# Patient Record
Sex: FEMALE | Race: WHITE | Employment: OTHER | URBAN - METROPOLITAN AREA
[De-identification: names, ages, dates, MRNs, and addresses within clinical notes are randomized per-mention and may not be internally consistent; named-entity substitution may affect disease eponyms.]

---

## 2017-05-25 PROBLEM — M25.561 ACUTE PAIN OF RIGHT KNEE: Status: ACTIVE | Noted: 2017-05-25

## 2017-06-27 PROBLEM — R31.29 MICROSCOPIC HEMATURIA: Status: ACTIVE | Noted: 2017-06-27

## 2017-07-05 ENCOUNTER — HOSPITAL ENCOUNTER (OUTPATIENT)
Dept: MAMMOGRAPHY | Age: 62
Discharge: HOME | End: 2017-07-05
Attending: FAMILY MEDICINE
Payer: COMMERCIAL

## 2017-07-05 DIAGNOSIS — N63.10 BREAST MASS, RIGHT: ICD-10-CM

## 2017-07-05 PROCEDURE — 76642 ULTRASOUND BREAST LIMITED: CPT

## 2017-07-05 PROCEDURE — 77066 DX MAMMO INCL CAD BI: CPT

## 2017-07-12 ENCOUNTER — HOSPITAL ENCOUNTER (OUTPATIENT)
Dept: CT IMAGING | Age: 62
Discharge: HOME | End: 2017-07-12
Attending: FAMILY MEDICINE
Payer: COMMERCIAL

## 2017-07-12 VITALS — HEIGHT: 59 IN

## 2017-07-12 DIAGNOSIS — R31.29 MICROSCOPIC HEMATURIA: ICD-10-CM

## 2017-07-12 PROCEDURE — 74178 CT ABD&PLV WO CNTR FLWD CNTR: CPT

## 2017-07-12 PROCEDURE — 74011636320 HC RX REV CODE- 636/320: Performed by: FAMILY MEDICINE

## 2017-07-12 PROCEDURE — 74011000258 HC RX REV CODE- 258: Performed by: FAMILY MEDICINE

## 2017-07-12 RX ORDER — SODIUM CHLORIDE 0.9 % (FLUSH) 0.9 %
10 SYRINGE (ML) INJECTION
Status: COMPLETED | OUTPATIENT
Start: 2017-07-12 | End: 2017-07-12

## 2017-07-12 RX ADMIN — IOPAMIDOL 100 ML: 755 INJECTION, SOLUTION INTRAVENOUS at 09:36

## 2017-07-12 RX ADMIN — SODIUM CHLORIDE 100 ML: 900 INJECTION, SOLUTION INTRAVENOUS at 09:36

## 2017-07-12 RX ADMIN — Medication 10 ML: at 09:36

## 2017-07-25 PROBLEM — R00.2 PALPITATIONS: Status: ACTIVE | Noted: 2017-07-25

## 2018-05-17 PROBLEM — R07.89 OTHER CHEST PAIN: Status: ACTIVE | Noted: 2018-05-17

## 2018-05-17 PROBLEM — K21.9 GASTROESOPHAGEAL REFLUX DISEASE WITHOUT ESOPHAGITIS: Status: ACTIVE | Noted: 2018-05-17

## 2018-07-10 PROBLEM — F17.200 TOBACCO DEPENDENCE: Status: ACTIVE | Noted: 2018-07-10

## 2018-07-10 PROBLEM — R07.89 OTHER CHEST PAIN: Status: RESOLVED | Noted: 2018-05-17 | Resolved: 2018-07-10

## 2018-07-26 ENCOUNTER — HOSPITAL ENCOUNTER (OUTPATIENT)
Dept: CT IMAGING | Age: 63
Discharge: HOME | End: 2018-07-26
Attending: FAMILY MEDICINE
Payer: COMMERCIAL

## 2018-07-26 VITALS — HEIGHT: 59 IN | WEIGHT: 150 LBS | BODY MASS INDEX: 30.24 KG/M2

## 2018-07-26 DIAGNOSIS — F17.200 TOBACCO DEPENDENCE: ICD-10-CM

## 2018-07-26 PROCEDURE — G0297 LDCT FOR LUNG CA SCREEN: HCPCS

## 2018-08-14 PROBLEM — L82.0 SEBORRHEIC KERATOSES, INFLAMED: Status: ACTIVE | Noted: 2018-08-14

## 2019-02-06 PROBLEM — M54.2 CERVICALGIA: Status: ACTIVE | Noted: 2019-02-06

## 2019-02-06 PROBLEM — R10.13 DYSPEPSIA: Status: ACTIVE | Noted: 2019-02-06

## 2020-01-01 ENCOUNTER — HOSPITAL ENCOUNTER (OUTPATIENT)
Dept: CT IMAGING | Age: 65
Discharge: HOME | End: 2020-10-23
Attending: INTERNAL MEDICINE
Payer: MEDICARE

## 2020-01-01 DIAGNOSIS — R91.1 LUNG NODULE: ICD-10-CM

## 2020-01-01 PROCEDURE — 71250 CT THORAX DX C-: CPT

## 2020-09-23 PROBLEM — Z87.891 HISTORY OF CIGARETTE SMOKING: Status: ACTIVE | Noted: 2018-07-10

## 2020-09-23 PROBLEM — R91.1 LUNG NODULE: Status: ACTIVE | Noted: 2020-01-01

## 2021-01-01 ENCOUNTER — APPOINTMENT (OUTPATIENT)
Dept: MRI IMAGING | Age: 66
DRG: 542 | End: 2021-01-01
Attending: NURSE PRACTITIONER
Payer: MEDICARE

## 2021-01-01 ENCOUNTER — HOSPICE ADMISSION (OUTPATIENT)
Dept: HOSPICE | Facility: HOSPICE | Age: 66
End: 2021-01-01
Payer: MEDICARE

## 2021-01-01 ENCOUNTER — HOME HEALTH ADMISSION (OUTPATIENT)
Dept: HOME HEALTH SERVICES | Facility: HOME HEALTH | Age: 66
End: 2021-01-01

## 2021-01-01 ENCOUNTER — HOSPITAL ENCOUNTER (OUTPATIENT)
Dept: CT IMAGING | Age: 66
Discharge: HOME | DRG: 542 | End: 2021-02-09
Attending: INTERNAL MEDICINE
Payer: MEDICARE

## 2021-01-01 ENCOUNTER — APPOINTMENT (OUTPATIENT)
Dept: CT IMAGING | Age: 66
End: 2021-01-01
Attending: NURSE PRACTITIONER
Payer: MEDICARE

## 2021-01-01 ENCOUNTER — HOME CARE VISIT (OUTPATIENT)
Dept: HOSPICE | Facility: HOSPICE | Age: 66
End: 2021-01-01
Payer: MEDICARE

## 2021-01-01 ENCOUNTER — DOCUMENTATION ONLY (OUTPATIENT)
Dept: NUTRITION | Age: 66
End: 2021-01-01

## 2021-01-01 ENCOUNTER — HOSPITAL ENCOUNTER (INPATIENT)
Age: 66
LOS: 11 days | Discharge: HOME | DRG: 542 | End: 2021-02-23
Attending: INTERNAL MEDICINE | Admitting: INTERNAL MEDICINE
Payer: MEDICARE

## 2021-01-01 ENCOUNTER — APPOINTMENT (OUTPATIENT)
Dept: NUCLEAR MEDICINE | Age: 66
DRG: 542 | End: 2021-01-01
Attending: NURSE PRACTITIONER
Payer: MEDICARE

## 2021-01-01 ENCOUNTER — HOSPITAL ENCOUNTER (OUTPATIENT)
Dept: INTERVENTIONAL RADIOLOGY/VASCULAR | Age: 66
Discharge: HOME | End: 2021-02-18
Attending: NURSE PRACTITIONER
Payer: MEDICARE

## 2021-01-01 ENCOUNTER — APPOINTMENT (OUTPATIENT)
Dept: CT IMAGING | Age: 66
DRG: 542 | End: 2021-01-01
Attending: NURSE PRACTITIONER
Payer: MEDICARE

## 2021-01-01 ENCOUNTER — HOSPITAL ENCOUNTER (OUTPATIENT)
Age: 66
Setting detail: OBSERVATION
Discharge: HOME | End: 2021-02-04
Attending: EMERGENCY MEDICINE | Admitting: HOSPITALIST
Payer: MEDICARE

## 2021-01-01 ENCOUNTER — HOSPITAL ENCOUNTER (OUTPATIENT)
Dept: INFUSION THERAPY | Age: 66
Discharge: HOME | End: 2021-02-12
Payer: MEDICARE

## 2021-01-01 ENCOUNTER — HOME CARE VISIT (OUTPATIENT)
Dept: SCHEDULING | Facility: HOME HEALTH | Age: 66
End: 2021-01-01
Payer: MEDICARE

## 2021-01-01 ENCOUNTER — APPOINTMENT (OUTPATIENT)
Dept: GENERAL RADIOLOGY | Age: 66
End: 2021-01-01
Attending: EMERGENCY MEDICINE
Payer: MEDICARE

## 2021-01-01 ENCOUNTER — APPOINTMENT (OUTPATIENT)
Dept: MRI IMAGING | Age: 66
End: 2021-01-01
Attending: FAMILY MEDICINE
Payer: MEDICARE

## 2021-01-01 ENCOUNTER — PATIENT OUTREACH (OUTPATIENT)
Dept: CASE MANAGEMENT | Age: 66
End: 2021-01-01

## 2021-01-01 ENCOUNTER — HOSPITAL ENCOUNTER (OUTPATIENT)
Dept: ULTRASOUND IMAGING | Age: 66
Discharge: HOME | End: 2021-02-02
Attending: NURSE PRACTITIONER
Payer: MEDICARE

## 2021-01-01 ENCOUNTER — HOSPITAL ENCOUNTER (OUTPATIENT)
Dept: INTERVENTIONAL RADIOLOGY/VASCULAR | Age: 66
Discharge: HOME | End: 2021-02-15
Attending: INTERNAL MEDICINE
Payer: MEDICARE

## 2021-01-01 ENCOUNTER — APPOINTMENT (OUTPATIENT)
Dept: GENERAL RADIOLOGY | Age: 66
DRG: 542 | End: 2021-01-01
Attending: NURSE PRACTITIONER
Payer: MEDICARE

## 2021-01-01 VITALS
HEIGHT: 59 IN | SYSTOLIC BLOOD PRESSURE: 130 MMHG | HEART RATE: 60 BPM | WEIGHT: 120 LBS | DIASTOLIC BLOOD PRESSURE: 90 MMHG | BODY MASS INDEX: 24.19 KG/M2

## 2021-01-01 VITALS
WEIGHT: 125.38 LBS | RESPIRATION RATE: 18 BRPM | SYSTOLIC BLOOD PRESSURE: 119 MMHG | DIASTOLIC BLOOD PRESSURE: 66 MMHG | HEIGHT: 59 IN | HEART RATE: 72 BPM | OXYGEN SATURATION: 97 % | TEMPERATURE: 98 F | BODY MASS INDEX: 25.28 KG/M2

## 2021-01-01 VITALS
DIASTOLIC BLOOD PRESSURE: 50 MMHG | TEMPERATURE: 98 F | SYSTOLIC BLOOD PRESSURE: 90 MMHG | RESPIRATION RATE: 16 BRPM | HEART RATE: 60 BPM

## 2021-01-01 VITALS
OXYGEN SATURATION: 98 % | HEART RATE: 74 BPM | HEIGHT: 59 IN | SYSTOLIC BLOOD PRESSURE: 219 MMHG | BODY MASS INDEX: 24.19 KG/M2 | RESPIRATION RATE: 18 BRPM | WEIGHT: 120 LBS | DIASTOLIC BLOOD PRESSURE: 100 MMHG | TEMPERATURE: 97.8 F

## 2021-01-01 VITALS
TEMPERATURE: 98.7 F | RESPIRATION RATE: 28 BRPM | DIASTOLIC BLOOD PRESSURE: 64 MMHG | SYSTOLIC BLOOD PRESSURE: 148 MMHG | HEART RATE: 144 BPM

## 2021-01-01 VITALS
RESPIRATION RATE: 18 BRPM | WEIGHT: 127.31 LBS | TEMPERATURE: 97.2 F | HEIGHT: 59 IN | SYSTOLIC BLOOD PRESSURE: 160 MMHG | OXYGEN SATURATION: 98 % | DIASTOLIC BLOOD PRESSURE: 86 MMHG | HEART RATE: 78 BPM | BODY MASS INDEX: 25.67 KG/M2

## 2021-01-01 VITALS
SYSTOLIC BLOOD PRESSURE: 130 MMHG | HEART RATE: 78 BPM | RESPIRATION RATE: 16 BRPM | TEMPERATURE: 97.6 F | DIASTOLIC BLOOD PRESSURE: 72 MMHG | HEIGHT: 59 IN | BODY MASS INDEX: 25.2 KG/M2 | OXYGEN SATURATION: 91 % | WEIGHT: 125 LBS

## 2021-01-01 VITALS
BODY MASS INDEX: 24.19 KG/M2 | DIASTOLIC BLOOD PRESSURE: 95 MMHG | SYSTOLIC BLOOD PRESSURE: 184 MMHG | HEIGHT: 59 IN | WEIGHT: 120 LBS | RESPIRATION RATE: 16 BRPM | HEART RATE: 76 BPM | OXYGEN SATURATION: 97 % | TEMPERATURE: 97.2 F

## 2021-01-01 VITALS
DIASTOLIC BLOOD PRESSURE: 90 MMHG | HEART RATE: 108 BPM | OXYGEN SATURATION: 100 % | SYSTOLIC BLOOD PRESSURE: 148 MMHG | RESPIRATION RATE: 16 BRPM

## 2021-01-01 VITALS
SYSTOLIC BLOOD PRESSURE: 142 MMHG | DIASTOLIC BLOOD PRESSURE: 76 MMHG | HEART RATE: 100 BPM | RESPIRATION RATE: 24 BRPM | TEMPERATURE: 97.4 F

## 2021-01-01 DIAGNOSIS — R91.1 PULMONARY NODULE: ICD-10-CM

## 2021-01-01 DIAGNOSIS — R79.89 ELEVATED LFTS: ICD-10-CM

## 2021-01-01 DIAGNOSIS — F41.9 ANXIETY: ICD-10-CM

## 2021-01-01 DIAGNOSIS — G89.3 CANCER ASSOCIATED PAIN: ICD-10-CM

## 2021-01-01 DIAGNOSIS — R10.84 ABDOMINAL PAIN, GENERALIZED: ICD-10-CM

## 2021-01-01 DIAGNOSIS — K86.9 PANCREATIC LESION: ICD-10-CM

## 2021-01-01 DIAGNOSIS — K76.9 HEPATIC LESION: ICD-10-CM

## 2021-01-01 DIAGNOSIS — E87.6 HYPOKALEMIA: ICD-10-CM

## 2021-01-01 DIAGNOSIS — R79.89 ELEVATED LACTIC ACID LEVEL: ICD-10-CM

## 2021-01-01 DIAGNOSIS — R10.84 GENERALIZED ABDOMINAL PAIN: ICD-10-CM

## 2021-01-01 DIAGNOSIS — C50.919 MALIGNANT NEOPLASM OF FEMALE BREAST, UNSPECIFIED ESTROGEN RECEPTOR STATUS, UNSPECIFIED LATERALITY, UNSPECIFIED SITE OF BREAST (HCC): ICD-10-CM

## 2021-01-01 DIAGNOSIS — C50.919 METASTATIC BREAST CANCER (HCC): ICD-10-CM

## 2021-01-01 DIAGNOSIS — R91.1 LUNG NODULE: ICD-10-CM

## 2021-01-01 DIAGNOSIS — R41.3 MEMORY LOSS: ICD-10-CM

## 2021-01-01 DIAGNOSIS — C79.9 METASTATIC ADENOCARCINOMA (HCC): ICD-10-CM

## 2021-01-01 DIAGNOSIS — E83.42 HYPOMAGNESEMIA: ICD-10-CM

## 2021-01-01 DIAGNOSIS — Z51.5 ENCOUNTER FOR PALLIATIVE CARE: ICD-10-CM

## 2021-01-01 DIAGNOSIS — R74.8 ELEVATED LIVER ENZYMES: ICD-10-CM

## 2021-01-01 DIAGNOSIS — R41.82 ALTERED MENTAL STATUS, UNSPECIFIED ALTERED MENTAL STATUS TYPE: ICD-10-CM

## 2021-01-01 DIAGNOSIS — R44.3 HALLUCINATIONS: ICD-10-CM

## 2021-01-01 DIAGNOSIS — R93.7 ABNORMAL MRI SCAN, BONE: ICD-10-CM

## 2021-01-01 DIAGNOSIS — R53.83 FATIGUE, UNSPECIFIED TYPE: ICD-10-CM

## 2021-01-01 DIAGNOSIS — R11.0 NAUSEA: ICD-10-CM

## 2021-01-01 DIAGNOSIS — K59.03 DRUG-INDUCED CONSTIPATION: ICD-10-CM

## 2021-01-01 DIAGNOSIS — N63.0 BREAST MASS IN FEMALE: ICD-10-CM

## 2021-01-01 DIAGNOSIS — N39.0 URINARY TRACT INFECTION WITHOUT HEMATURIA, SITE UNSPECIFIED: Primary | ICD-10-CM

## 2021-01-01 DIAGNOSIS — K76.9 LESION OF LIVER: ICD-10-CM

## 2021-01-01 DIAGNOSIS — E86.0 DEHYDRATION: Primary | ICD-10-CM

## 2021-01-01 DIAGNOSIS — E86.0 DEHYDRATION: ICD-10-CM

## 2021-01-01 DIAGNOSIS — R62.7 FAILURE TO THRIVE IN ADULT: ICD-10-CM

## 2021-01-01 DIAGNOSIS — I10 ESSENTIAL HYPERTENSION: ICD-10-CM

## 2021-01-01 DIAGNOSIS — R41.0 CONFUSION: Primary | ICD-10-CM

## 2021-01-01 DIAGNOSIS — C79.9 METASTATIC MALIGNANT NEOPLASM, UNSPECIFIED SITE (HCC): ICD-10-CM

## 2021-01-01 DIAGNOSIS — K59.00 CONSTIPATION, UNSPECIFIED CONSTIPATION TYPE: ICD-10-CM

## 2021-01-01 LAB
25(OH)D3 SERPL-MCNC: 78 NG/ML (ref 30–100)
AFP-TM SERPL-MCNC: 2.3 NG/ML
ALBUMIN SERPL-MCNC: 2 G/DL (ref 3.2–4.6)
ALBUMIN SERPL-MCNC: 2.2 G/DL (ref 3.2–4.6)
ALBUMIN SERPL-MCNC: 2.2 G/DL (ref 3.2–4.6)
ALBUMIN SERPL-MCNC: 2.3 G/DL (ref 3.2–4.6)
ALBUMIN SERPL-MCNC: 2.4 G/DL (ref 3.2–4.6)
ALBUMIN SERPL-MCNC: 2.5 G/DL (ref 3.2–4.6)
ALBUMIN SERPL-MCNC: 2.6 G/DL (ref 3.2–4.6)
ALBUMIN SERPL-MCNC: 2.7 G/DL (ref 3.2–4.6)
ALBUMIN SERPL-MCNC: 2.7 G/DL (ref 3.2–4.6)
ALBUMIN SERPL-MCNC: 2.8 G/DL (ref 3.2–4.6)
ALBUMIN SERPL-MCNC: 3 G/DL (ref 3.2–4.6)
ALBUMIN/GLOB SERPL: 0.6 {RATIO} (ref 1.2–3.5)
ALBUMIN/GLOB SERPL: 0.7 {RATIO} (ref 1.2–3.5)
ALBUMIN/GLOB SERPL: 0.7 {RATIO} (ref 1.2–3.5)
ALP SERPL-CCNC: 168 U/L (ref 50–136)
ALP SERPL-CCNC: 178 U/L (ref 50–136)
ALP SERPL-CCNC: 189 U/L (ref 50–136)
ALP SERPL-CCNC: 194 U/L (ref 50–130)
ALP SERPL-CCNC: 207 U/L (ref 50–130)
ALP SERPL-CCNC: 243 U/L (ref 50–136)
ALP SERPL-CCNC: 250 U/L (ref 50–136)
ALP SERPL-CCNC: 252 U/L (ref 50–136)
ALP SERPL-CCNC: 266 U/L (ref 50–130)
ALP SERPL-CCNC: 266 U/L (ref 50–136)
ALP SERPL-CCNC: 269 U/L (ref 50–136)
ALP SERPL-CCNC: 276 U/L (ref 50–136)
ALP SERPL-CCNC: 289 U/L (ref 50–136)
ALP SERPL-CCNC: 342 U/L (ref 50–136)
ALP SERPL-CCNC: 354 U/L (ref 50–136)
ALP SERPL-CCNC: 363 U/L (ref 50–136)
ALP SERPL-CCNC: 434 U/L (ref 50–136)
ALP SERPL-CCNC: 455 U/L (ref 50–136)
ALT SERPL-CCNC: 104 U/L (ref 12–65)
ALT SERPL-CCNC: 20 U/L (ref 12–65)
ALT SERPL-CCNC: 23 U/L (ref 12–65)
ALT SERPL-CCNC: 25 U/L (ref 12–65)
ALT SERPL-CCNC: 27 U/L (ref 12–65)
ALT SERPL-CCNC: 27 U/L (ref 12–65)
ALT SERPL-CCNC: 29 U/L (ref 12–65)
ALT SERPL-CCNC: 33 U/L (ref 12–65)
ALT SERPL-CCNC: 37 U/L (ref 12–65)
ALT SERPL-CCNC: 38 U/L (ref 12–65)
ALT SERPL-CCNC: 38 U/L (ref 12–65)
ALT SERPL-CCNC: 40 U/L (ref 12–65)
ALT SERPL-CCNC: 51 U/L (ref 12–65)
ALT SERPL-CCNC: 59 U/L (ref 12–65)
ALT SERPL-CCNC: 89 U/L (ref 12–65)
ALT SERPL-CCNC: 90 U/L (ref 12–65)
AMMONIA PLAS-SCNC: 20 UMOL/L (ref 24.9–68)
AMYLASE SERPL-CCNC: 38 U/L (ref 25–115)
ANION GAP SERPL CALC-SCNC: 10 MMOL/L (ref 7–16)
ANION GAP SERPL CALC-SCNC: 11 MMOL/L (ref 7–16)
ANION GAP SERPL CALC-SCNC: 12 MMOL/L (ref 7–16)
ANION GAP SERPL CALC-SCNC: 13 MMOL/L (ref 7–16)
ANION GAP SERPL CALC-SCNC: 14 MMOL/L (ref 7–16)
ANION GAP SERPL CALC-SCNC: 7 MMOL/L (ref 7–16)
ANION GAP SERPL CALC-SCNC: 8 MMOL/L (ref 7–16)
ANION GAP SERPL CALC-SCNC: 8 MMOL/L (ref 7–16)
ANION GAP SERPL CALC-SCNC: 9 MMOL/L (ref 7–16)
APPEARANCE FLD: CLEAR
APPEARANCE FLD: NORMAL
APPEARANCE UR: ABNORMAL
APPEARANCE UR: CLEAR
AST SERPL-CCNC: 105 U/L (ref 15–37)
AST SERPL-CCNC: 21 U/L (ref 15–37)
AST SERPL-CCNC: 24 U/L (ref 15–37)
AST SERPL-CCNC: 27 U/L (ref 15–37)
AST SERPL-CCNC: 30 U/L (ref 15–37)
AST SERPL-CCNC: 31 U/L (ref 15–37)
AST SERPL-CCNC: 31 U/L (ref 15–37)
AST SERPL-CCNC: 36 U/L (ref 15–37)
AST SERPL-CCNC: 40 U/L (ref 15–37)
AST SERPL-CCNC: 41 U/L (ref 15–37)
AST SERPL-CCNC: 42 U/L (ref 15–37)
AST SERPL-CCNC: 44 U/L (ref 15–37)
AST SERPL-CCNC: 55 U/L (ref 15–37)
AST SERPL-CCNC: 57 U/L (ref 15–37)
AST SERPL-CCNC: 62 U/L (ref 15–37)
AST SERPL-CCNC: 75 U/L (ref 15–37)
AST SERPL-CCNC: 80 U/L (ref 15–37)
AST SERPL-CCNC: 99 U/L (ref 15–37)
ATRIAL RATE: 119 BPM
BACTERIA SPEC CULT: NORMAL
BACTERIA URNS QL MICRO: ABNORMAL /HPF
BASOPHILS # BLD: 0 K/UL (ref 0–0.2)
BASOPHILS NFR BLD: 0 % (ref 0–2)
BILIRUB DIRECT SERPL-MCNC: 0.1 MG/DL
BILIRUB SERPL-MCNC: 0.3 MG/DL (ref 0.2–1.1)
BILIRUB SERPL-MCNC: 0.4 MG/DL (ref 0.2–1.1)
BILIRUB SERPL-MCNC: 0.5 MG/DL (ref 0.2–1.1)
BILIRUB SERPL-MCNC: 0.6 MG/DL (ref 0.2–1.1)
BILIRUB SERPL-MCNC: 0.6 MG/DL (ref 0.2–1.1)
BILIRUB UR QL: NEGATIVE
BILIRUB UR QL: NEGATIVE
BUN SERPL-MCNC: 10 MG/DL (ref 8–23)
BUN SERPL-MCNC: 10 MG/DL (ref 8–23)
BUN SERPL-MCNC: 11 MG/DL (ref 8–23)
BUN SERPL-MCNC: 12 MG/DL (ref 8–23)
BUN SERPL-MCNC: 13 MG/DL (ref 8–23)
BUN SERPL-MCNC: 14 MG/DL (ref 8–23)
BUN SERPL-MCNC: 15 MG/DL (ref 8–23)
BUN SERPL-MCNC: 18 MG/DL (ref 8–23)
BUN SERPL-MCNC: 18 MG/DL (ref 8–23)
BUN SERPL-MCNC: 4 MG/DL (ref 8–23)
BUN SERPL-MCNC: 4 MG/DL (ref 8–23)
BUN SERPL-MCNC: 6 MG/DL (ref 8–23)
BUN SERPL-MCNC: 8 MG/DL (ref 8–23)
BUN SERPL-MCNC: 8 MG/DL (ref 8–23)
BUN SERPL-MCNC: 9 MG/DL (ref 8–23)
CALCIUM SERPL-MCNC: 10.4 MG/DL (ref 8.3–10.4)
CALCIUM SERPL-MCNC: 8.9 MG/DL (ref 8.3–10.4)
CALCIUM SERPL-MCNC: 9 MG/DL (ref 8.3–10.4)
CALCIUM SERPL-MCNC: 9.1 MG/DL (ref 8.3–10.4)
CALCIUM SERPL-MCNC: 9.2 MG/DL (ref 8.3–10.4)
CALCIUM SERPL-MCNC: 9.2 MG/DL (ref 8.3–10.4)
CALCIUM SERPL-MCNC: 9.3 MG/DL (ref 8.3–10.4)
CALCIUM SERPL-MCNC: 9.4 MG/DL (ref 8.3–10.4)
CALCIUM SERPL-MCNC: 9.5 MG/DL (ref 8.3–10.4)
CALCIUM SERPL-MCNC: 9.6 MG/DL (ref 8.3–10.4)
CALCIUM SERPL-MCNC: 9.7 MG/DL (ref 8.3–10.4)
CALCIUM SERPL-MCNC: 9.8 MG/DL (ref 8.3–10.4)
CALCULATED P AXIS, ECG09: 48 DEGREES
CALCULATED R AXIS, ECG10: 31 DEGREES
CALCULATED T AXIS, ECG11: 37 DEGREES
CANCER AG15-3 SERPL-ACNC: 1032.5 U/ML (ref 1–35)
CANCER AG19-9 SERPL-ACNC: 2367.3 U/ML (ref 2–37)
CANCER AG27-29 SERPL-ACNC: 2817.3 U/ML (ref 0–38.6)
CEA SERPL-MCNC: 140.7 NG/ML (ref 0–3)
CGA SERPL-SCNC: 369.7 NG/ML (ref 0–101.8)
CHLORIDE SERPL-SCNC: 100 MMOL/L (ref 98–107)
CHLORIDE SERPL-SCNC: 101 MMOL/L (ref 98–107)
CHLORIDE SERPL-SCNC: 102 MMOL/L (ref 98–107)
CHLORIDE SERPL-SCNC: 102 MMOL/L (ref 98–107)
CHLORIDE SERPL-SCNC: 103 MMOL/L (ref 98–107)
CHLORIDE SERPL-SCNC: 103 MMOL/L (ref 98–107)
CHLORIDE SERPL-SCNC: 104 MMOL/L (ref 98–107)
CHLORIDE SERPL-SCNC: 107 MMOL/L (ref 98–107)
CHLORIDE SERPL-SCNC: 107 MMOL/L (ref 98–107)
CHLORIDE SERPL-SCNC: 108 MMOL/L (ref 98–107)
CHLORIDE SERPL-SCNC: 109 MMOL/L (ref 98–107)
CHLORIDE SERPL-SCNC: 97 MMOL/L (ref 98–107)
CHLORIDE SERPL-SCNC: 98 MMOL/L (ref 98–107)
CO2 SERPL-SCNC: 19 MMOL/L (ref 21–32)
CO2 SERPL-SCNC: 19 MMOL/L (ref 21–32)
CO2 SERPL-SCNC: 21 MMOL/L (ref 21–32)
CO2 SERPL-SCNC: 22 MMOL/L (ref 21–32)
CO2 SERPL-SCNC: 23 MMOL/L (ref 21–32)
CO2 SERPL-SCNC: 23 MMOL/L (ref 21–32)
COLOR FLD: COLORLESS
COLOR FLD: NORMAL
COLOR UR: YELLOW
COLOR UR: YELLOW
COVID-19 RAPID TEST, COVR: NOT DETECTED
CREAT SERPL-MCNC: 0.42 MG/DL (ref 0.6–1)
CREAT SERPL-MCNC: 0.44 MG/DL (ref 0.6–1)
CREAT SERPL-MCNC: 0.46 MG/DL (ref 0.6–1)
CREAT SERPL-MCNC: 0.46 MG/DL (ref 0.6–1)
CREAT SERPL-MCNC: 0.49 MG/DL (ref 0.6–1)
CREAT SERPL-MCNC: 0.5 MG/DL (ref 0.6–1)
CREAT SERPL-MCNC: 0.5 MG/DL (ref 0.6–1)
CREAT SERPL-MCNC: 0.51 MG/DL (ref 0.6–1)
CREAT SERPL-MCNC: 0.56 MG/DL (ref 0.6–1)
CREAT SERPL-MCNC: 0.58 MG/DL (ref 0.6–1)
CREAT SERPL-MCNC: 0.59 MG/DL (ref 0.6–1)
CREAT SERPL-MCNC: 0.59 MG/DL (ref 0.6–1)
CREAT SERPL-MCNC: 0.6 MG/DL (ref 0.6–1)
CREAT SERPL-MCNC: 0.64 MG/DL (ref 0.6–1)
CREAT SERPL-MCNC: 0.65 MG/DL (ref 0.6–1)
CREAT SERPL-MCNC: 0.66 MG/DL (ref 0.6–1)
CREAT SERPL-MCNC: 0.69 MG/DL (ref 0.6–1)
CREAT SERPL-MCNC: 0.8 MG/DL (ref 0.6–1)
CREAT SERPL-MCNC: 0.96 MG/DL (ref 0.6–1)
CRYPTOC AG CSF QL IA: NEGATIVE
DIAGNOSIS, 93000: NORMAL
DIFFERENTIAL METHOD BLD: ABNORMAL
EOSINOPHIL # BLD: 0 K/UL (ref 0–0.8)
EOSINOPHIL # BLD: 0.1 K/UL (ref 0–0.8)
EOSINOPHIL NFR BLD: 0 % (ref 0.5–7.8)
EOSINOPHIL NFR BLD: 1 % (ref 0.5–7.8)
EPI CELLS #/AREA URNS HPF: ABNORMAL /HPF
ERYTHROCYTE [DISTWIDTH] IN BLOOD BY AUTOMATED COUNT: 13.8 % (ref 11.9–14.6)
ERYTHROCYTE [DISTWIDTH] IN BLOOD BY AUTOMATED COUNT: 13.9 % (ref 11.9–14.6)
ERYTHROCYTE [DISTWIDTH] IN BLOOD BY AUTOMATED COUNT: 14.2 % (ref 11.9–14.6)
ERYTHROCYTE [DISTWIDTH] IN BLOOD BY AUTOMATED COUNT: 14.3 % (ref 11.9–14.6)
ERYTHROCYTE [DISTWIDTH] IN BLOOD BY AUTOMATED COUNT: 14.3 % (ref 11.9–14.6)
ERYTHROCYTE [DISTWIDTH] IN BLOOD BY AUTOMATED COUNT: 14.4 % (ref 11.9–14.6)
ERYTHROCYTE [DISTWIDTH] IN BLOOD BY AUTOMATED COUNT: 14.5 % (ref 11.9–14.6)
ERYTHROCYTE [DISTWIDTH] IN BLOOD BY AUTOMATED COUNT: 14.6 % (ref 11.9–14.6)
ERYTHROCYTE [DISTWIDTH] IN BLOOD BY AUTOMATED COUNT: 14.7 % (ref 11.9–14.6)
ERYTHROCYTE [DISTWIDTH] IN BLOOD BY AUTOMATED COUNT: 14.7 % (ref 11.9–14.6)
ERYTHROCYTE [DISTWIDTH] IN BLOOD BY AUTOMATED COUNT: 14.8 % (ref 11.9–14.6)
ERYTHROCYTE [DISTWIDTH] IN BLOOD BY AUTOMATED COUNT: 14.8 % (ref 11.9–14.6)
ERYTHROCYTE [DISTWIDTH] IN BLOOD BY AUTOMATED COUNT: 14.9 % (ref 11.9–14.6)
ERYTHROCYTE [DISTWIDTH] IN BLOOD BY AUTOMATED COUNT: 14.9 % (ref 11.9–14.6)
FERRITIN SERPL-MCNC: 113 NG/ML (ref 8–388)
FOLATE SERPL-MCNC: 4.9 NG/ML (ref 3.1–17.5)
GLOBULIN SER CALC-MCNC: 3.6 G/DL (ref 2.3–3.5)
GLOBULIN SER CALC-MCNC: 3.7 G/DL (ref 2.3–3.5)
GLOBULIN SER CALC-MCNC: 3.8 G/DL (ref 2.3–3.5)
GLOBULIN SER CALC-MCNC: 3.9 G/DL (ref 2.3–3.5)
GLOBULIN SER CALC-MCNC: 4 G/DL (ref 2.3–3.5)
GLOBULIN SER CALC-MCNC: 4.1 G/DL (ref 2.3–3.5)
GLOBULIN SER CALC-MCNC: 4.3 G/DL (ref 2.3–3.5)
GLOBULIN SER CALC-MCNC: 4.3 G/DL (ref 2.3–3.5)
GLOBULIN SER CALC-MCNC: 5.1 G/DL (ref 2.3–3.5)
GLUCOSE BLD STRIP.AUTO-MCNC: 117 MG/DL (ref 65–100)
GLUCOSE CSF-MCNC: 29 MG/DL (ref 40–70)
GLUCOSE CSF-MCNC: 34 MG/DL (ref 40–70)
GLUCOSE SERPL-MCNC: 102 MG/DL (ref 65–100)
GLUCOSE SERPL-MCNC: 106 MG/DL (ref 65–100)
GLUCOSE SERPL-MCNC: 107 MG/DL (ref 65–100)
GLUCOSE SERPL-MCNC: 109 MG/DL (ref 65–100)
GLUCOSE SERPL-MCNC: 109 MG/DL (ref 65–100)
GLUCOSE SERPL-MCNC: 114 MG/DL (ref 65–100)
GLUCOSE SERPL-MCNC: 114 MG/DL (ref 65–100)
GLUCOSE SERPL-MCNC: 119 MG/DL (ref 65–100)
GLUCOSE SERPL-MCNC: 121 MG/DL (ref 65–100)
GLUCOSE SERPL-MCNC: 123 MG/DL (ref 65–100)
GLUCOSE SERPL-MCNC: 128 MG/DL (ref 65–100)
GLUCOSE SERPL-MCNC: 86 MG/DL (ref 65–100)
GLUCOSE SERPL-MCNC: 88 MG/DL (ref 65–100)
GLUCOSE SERPL-MCNC: 92 MG/DL (ref 65–100)
GLUCOSE SERPL-MCNC: 92 MG/DL (ref 65–100)
GLUCOSE SERPL-MCNC: 95 MG/DL (ref 65–100)
GLUCOSE SERPL-MCNC: 95 MG/DL (ref 65–100)
GLUCOSE SERPL-MCNC: 96 MG/DL (ref 65–100)
GLUCOSE SERPL-MCNC: 97 MG/DL (ref 65–100)
GLUCOSE UR STRIP.AUTO-MCNC: NEGATIVE MG/DL
GLUCOSE UR STRIP.AUTO-MCNC: NEGATIVE MG/DL
GRAM STN SPEC: NORMAL
GRAM STN SPEC: NORMAL
HCT VFR BLD AUTO: 27.4 % (ref 35.8–46.3)
HCT VFR BLD AUTO: 28.9 % (ref 35.8–46.3)
HCT VFR BLD AUTO: 29.6 % (ref 35.8–46.3)
HCT VFR BLD AUTO: 30.5 % (ref 35.8–46.3)
HCT VFR BLD AUTO: 31.6 % (ref 35.8–46.3)
HCT VFR BLD AUTO: 31.7 % (ref 35.8–46.3)
HCT VFR BLD AUTO: 31.9 % (ref 35.8–46.3)
HCT VFR BLD AUTO: 32 % (ref 35.8–46.3)
HCT VFR BLD AUTO: 32 % (ref 35.8–46.3)
HCT VFR BLD AUTO: 32.5 % (ref 35.8–46.3)
HCT VFR BLD AUTO: 32.7 % (ref 35.8–46.3)
HCT VFR BLD AUTO: 32.7 % (ref 35.8–46.3)
HCT VFR BLD AUTO: 33.1 % (ref 35.8–46.3)
HCT VFR BLD AUTO: 33.4 % (ref 35.8–46.3)
HCT VFR BLD AUTO: 33.8 % (ref 35.8–46.3)
HCT VFR BLD AUTO: 34.4 % (ref 35.8–46.3)
HCT VFR BLD AUTO: 36.2 % (ref 35.8–46.3)
HCT VFR BLD AUTO: 37.2 % (ref 35.8–46.3)
HGB BLD-MCNC: 10.5 G/DL (ref 11.7–15.4)
HGB BLD-MCNC: 10.5 G/DL (ref 11.7–15.4)
HGB BLD-MCNC: 10.6 G/DL (ref 11.7–15.4)
HGB BLD-MCNC: 10.7 G/DL (ref 11.7–15.4)
HGB BLD-MCNC: 10.8 G/DL (ref 11.7–15.4)
HGB BLD-MCNC: 10.9 G/DL (ref 11.7–15.4)
HGB BLD-MCNC: 11.1 G/DL (ref 11.7–15.4)
HGB BLD-MCNC: 11.3 G/DL (ref 11.7–15.4)
HGB BLD-MCNC: 11.4 G/DL (ref 11.7–15.4)
HGB BLD-MCNC: 11.6 G/DL (ref 11.7–15.4)
HGB BLD-MCNC: 12.3 G/DL (ref 11.7–15.4)
HGB BLD-MCNC: 12.3 G/DL (ref 11.7–15.4)
HGB BLD-MCNC: 9.2 G/DL (ref 11.7–15.4)
HGB BLD-MCNC: 9.7 G/DL (ref 11.7–15.4)
HGB BLD-MCNC: 9.7 G/DL (ref 11.7–15.4)
HGB BLD-MCNC: 9.8 G/DL (ref 11.7–15.4)
HGB UR QL STRIP: NEGATIVE
HGB UR QL STRIP: NEGATIVE
IMM GRANULOCYTES # BLD AUTO: 0 K/UL (ref 0–0.5)
IMM GRANULOCYTES # BLD AUTO: 0.1 K/UL (ref 0–0.5)
IMM GRANULOCYTES NFR BLD AUTO: 0 % (ref 0–5)
IMM GRANULOCYTES NFR BLD AUTO: 1 % (ref 0–5)
IRON SATN MFR SERPL: 17 %
IRON SERPL-MCNC: 23 UG/DL (ref 35–150)
KETONES UR QL STRIP.AUTO: NEGATIVE MG/DL
KETONES UR QL STRIP.AUTO: NEGATIVE MG/DL
LACTATE SERPL-SCNC: 1.2 MMOL/L (ref 0.4–2)
LACTATE SERPL-SCNC: 2.3 MMOL/L (ref 0.4–2)
LACTATE SERPL-SCNC: 3.5 MMOL/L (ref 0.4–2)
LDH SERPL L TO P-CCNC: 28 IU/L
LEUKOCYTE ESTERASE UR QL STRIP.AUTO: ABNORMAL
LEUKOCYTE ESTERASE UR QL STRIP.AUTO: NEGATIVE
LIPASE SERPL-CCNC: 107 U/L (ref 73–393)
LYMPHOCYTES # BLD: 0.8 K/UL (ref 0.5–4.6)
LYMPHOCYTES # BLD: 1 K/UL (ref 0.5–4.6)
LYMPHOCYTES # BLD: 1.3 K/UL (ref 0.5–4.6)
LYMPHOCYTES # BLD: 1.5 K/UL (ref 0.5–4.6)
LYMPHOCYTES # BLD: 1.7 K/UL (ref 0.5–4.6)
LYMPHOCYTES # BLD: 1.8 K/UL (ref 0.5–4.6)
LYMPHOCYTES # BLD: 1.9 K/UL (ref 0.5–4.6)
LYMPHOCYTES # BLD: 2 K/UL (ref 0.5–4.6)
LYMPHOCYTES # BLD: 2.1 K/UL (ref 0.5–4.6)
LYMPHOCYTES # BLD: 2.1 K/UL (ref 0.5–4.6)
LYMPHOCYTES # BLD: 2.2 K/UL (ref 0.5–4.6)
LYMPHOCYTES # BLD: 2.2 K/UL (ref 0.5–4.6)
LYMPHOCYTES # BLD: 3.2 K/UL (ref 0.5–4.6)
LYMPHOCYTES NFR BLD: 10 % (ref 13–44)
LYMPHOCYTES NFR BLD: 11 % (ref 13–44)
LYMPHOCYTES NFR BLD: 13 % (ref 13–44)
LYMPHOCYTES NFR BLD: 16 % (ref 13–44)
LYMPHOCYTES NFR BLD: 17 % (ref 13–44)
LYMPHOCYTES NFR BLD: 17 % (ref 13–44)
LYMPHOCYTES NFR BLD: 18 % (ref 13–44)
LYMPHOCYTES NFR BLD: 18 % (ref 13–44)
LYMPHOCYTES NFR BLD: 19 % (ref 13–44)
LYMPHOCYTES NFR BLD: 20 % (ref 13–44)
LYMPHOCYTES NFR BLD: 21 % (ref 13–44)
LYMPHOCYTES NFR BLD: 22 % (ref 13–44)
LYMPHOCYTES NFR BLD: 26 % (ref 13–44)
LYMPHOCYTES NFR BLD: 27 % (ref 13–44)
MAGNESIUM SERPL-MCNC: 1.3 MG/DL (ref 1.8–2.4)
MAGNESIUM SERPL-MCNC: 1.4 MG/DL (ref 1.8–2.4)
MAGNESIUM SERPL-MCNC: 1.6 MG/DL (ref 1.8–2.4)
MAGNESIUM SERPL-MCNC: 1.7 MG/DL (ref 1.8–2.4)
MAGNESIUM SERPL-MCNC: 1.8 MG/DL (ref 1.8–2.4)
MAGNESIUM SERPL-MCNC: 1.9 MG/DL (ref 1.8–2.4)
MAGNESIUM SERPL-MCNC: 2 MG/DL (ref 1.8–2.4)
MAGNESIUM SERPL-MCNC: 2.2 MG/DL (ref 1.8–2.4)
MCH RBC QN AUTO: 25.5 PG (ref 26.1–32.9)
MCH RBC QN AUTO: 25.6 PG (ref 26.1–32.9)
MCH RBC QN AUTO: 25.6 PG (ref 26.1–32.9)
MCH RBC QN AUTO: 25.8 PG (ref 26.1–32.9)
MCH RBC QN AUTO: 25.9 PG (ref 26.1–32.9)
MCH RBC QN AUTO: 26 PG (ref 26.1–32.9)
MCH RBC QN AUTO: 26.1 PG (ref 26.1–32.9)
MCH RBC QN AUTO: 26.2 PG (ref 26.1–32.9)
MCH RBC QN AUTO: 26.3 PG (ref 26.1–32.9)
MCH RBC QN AUTO: 26.5 PG (ref 26.1–32.9)
MCHC RBC AUTO-ENTMCNC: 32.1 G/DL (ref 31.4–35)
MCHC RBC AUTO-ENTMCNC: 32.1 G/DL (ref 31.4–35)
MCHC RBC AUTO-ENTMCNC: 32.8 G/DL (ref 31.4–35)
MCHC RBC AUTO-ENTMCNC: 32.8 G/DL (ref 31.4–35)
MCHC RBC AUTO-ENTMCNC: 33.1 G/DL (ref 31.4–35)
MCHC RBC AUTO-ENTMCNC: 33.3 G/DL (ref 31.4–35)
MCHC RBC AUTO-ENTMCNC: 33.5 G/DL (ref 31.4–35)
MCHC RBC AUTO-ENTMCNC: 33.6 G/DL (ref 31.4–35)
MCHC RBC AUTO-ENTMCNC: 33.6 G/DL (ref 31.4–35)
MCHC RBC AUTO-ENTMCNC: 33.7 G/DL (ref 31.4–35)
MCHC RBC AUTO-ENTMCNC: 33.7 G/DL (ref 31.4–35)
MCHC RBC AUTO-ENTMCNC: 33.8 G/DL (ref 31.4–35)
MCHC RBC AUTO-ENTMCNC: 33.8 G/DL (ref 31.4–35)
MCHC RBC AUTO-ENTMCNC: 33.9 G/DL (ref 31.4–35)
MCHC RBC AUTO-ENTMCNC: 34 G/DL (ref 31.4–35)
MCHC RBC AUTO-ENTMCNC: 34.1 G/DL (ref 31.4–35)
MCV RBC AUTO: 76.2 FL (ref 79.6–97.8)
MCV RBC AUTO: 76.8 FL (ref 79.6–97.8)
MCV RBC AUTO: 76.8 FL (ref 79.6–97.8)
MCV RBC AUTO: 76.9 FL (ref 79.6–97.8)
MCV RBC AUTO: 77 FL (ref 79.6–97.8)
MCV RBC AUTO: 77.1 FL (ref 79.6–97.8)
MCV RBC AUTO: 77.1 FL (ref 79.6–97.8)
MCV RBC AUTO: 77.3 FL (ref 79.6–97.8)
MCV RBC AUTO: 77.4 FL (ref 79.6–97.8)
MCV RBC AUTO: 77.5 FL (ref 79.6–97.8)
MCV RBC AUTO: 77.6 FL (ref 79.6–97.8)
MCV RBC AUTO: 77.9 FL (ref 79.6–97.8)
MCV RBC AUTO: 78 FL (ref 79.6–97.8)
MCV RBC AUTO: 78.3 FL (ref 79.6–97.8)
MCV RBC AUTO: 78.9 FL (ref 79.6–97.8)
MCV RBC AUTO: 79 FL (ref 79.6–97.8)
MCV RBC AUTO: 79.4 FL (ref 79.6–97.8)
MCV RBC AUTO: 80.5 FL (ref 79.6–97.8)
MENINGITIS PANEL, XMEPT: NORMAL
MONOCYTES # BLD: 0.1 K/UL (ref 0.1–1.3)
MONOCYTES # BLD: 0.1 K/UL (ref 0.1–1.3)
MONOCYTES # BLD: 0.2 K/UL (ref 0.1–1.3)
MONOCYTES # BLD: 0.3 K/UL (ref 0.1–1.3)
MONOCYTES # BLD: 0.7 K/UL (ref 0.1–1.3)
MONOCYTES # BLD: 0.8 K/UL (ref 0.1–1.3)
MONOCYTES # BLD: 0.9 K/UL (ref 0.1–1.3)
MONOCYTES # BLD: 1 K/UL (ref 0.1–1.3)
MONOCYTES # BLD: 1 K/UL (ref 0.1–1.3)
MONOCYTES # BLD: 1.1 K/UL (ref 0.1–1.3)
MONOCYTES # BLD: 1.2 K/UL (ref 0.1–1.3)
MONOCYTES NFR BLD: 1 % (ref 4–12)
MONOCYTES NFR BLD: 1 % (ref 4–12)
MONOCYTES NFR BLD: 10 % (ref 4–12)
MONOCYTES NFR BLD: 12 % (ref 4–12)
MONOCYTES NFR BLD: 2 % (ref 4–12)
MONOCYTES NFR BLD: 3 % (ref 4–12)
MONOCYTES NFR BLD: 7 % (ref 4–12)
MONOCYTES NFR BLD: 8 % (ref 4–12)
MONOCYTES NFR BLD: 9 % (ref 4–12)
NEUTS SEG # BLD: 4.2 K/UL (ref 1.7–8.2)
NEUTS SEG # BLD: 6.3 K/UL (ref 1.7–8.2)
NEUTS SEG # BLD: 6.4 K/UL (ref 1.7–8.2)
NEUTS SEG # BLD: 6.5 K/UL (ref 1.7–8.2)
NEUTS SEG # BLD: 6.6 K/UL (ref 1.7–8.2)
NEUTS SEG # BLD: 6.6 K/UL (ref 1.7–8.2)
NEUTS SEG # BLD: 6.7 K/UL (ref 1.7–8.2)
NEUTS SEG # BLD: 6.8 K/UL (ref 1.7–8.2)
NEUTS SEG # BLD: 7 K/UL (ref 1.7–8.2)
NEUTS SEG # BLD: 7.2 K/UL (ref 1.7–8.2)
NEUTS SEG # BLD: 7.3 K/UL (ref 1.7–8.2)
NEUTS SEG # BLD: 7.7 K/UL (ref 1.7–8.2)
NEUTS SEG # BLD: 7.7 K/UL (ref 1.7–8.2)
NEUTS SEG # BLD: 7.9 K/UL (ref 1.7–8.2)
NEUTS SEG # BLD: 7.9 K/UL (ref 1.7–8.2)
NEUTS SEG # BLD: 8 K/UL (ref 1.7–8.2)
NEUTS SEG # BLD: 8.4 K/UL (ref 1.7–8.2)
NEUTS SEG # BLD: 8.5 K/UL (ref 1.7–8.2)
NEUTS SEG NFR BLD: 60 % (ref 43–78)
NEUTS SEG NFR BLD: 64 % (ref 43–78)
NEUTS SEG NFR BLD: 68 % (ref 43–78)
NEUTS SEG NFR BLD: 68 % (ref 43–78)
NEUTS SEG NFR BLD: 69 % (ref 43–78)
NEUTS SEG NFR BLD: 71 % (ref 43–78)
NEUTS SEG NFR BLD: 72 % (ref 43–78)
NEUTS SEG NFR BLD: 73 % (ref 43–78)
NEUTS SEG NFR BLD: 74 % (ref 43–78)
NEUTS SEG NFR BLD: 80 % (ref 43–78)
NEUTS SEG NFR BLD: 85 % (ref 43–78)
NEUTS SEG NFR BLD: 85 % (ref 43–78)
NEUTS SEG NFR BLD: 89 % (ref 43–78)
NITRITE UR QL STRIP.AUTO: NEGATIVE
NITRITE UR QL STRIP.AUTO: NEGATIVE
NON-GYNECOLOGIC CYTOLOGY REPRT: NORMAL
NON-GYNECOLOGIC CYTOLOGY REPRT: NORMAL
NRBC # BLD: 0 K/UL (ref 0–0.2)
NUC CELL # FLD: 2 /CU MM
NUC CELL # FLD: 8 /CU MM
P-R INTERVAL, ECG05: 134 MS
PH UR STRIP: 7 [PH] (ref 5–9)
PH UR STRIP: 7.5 [PH] (ref 5–9)
PLATELET # BLD AUTO: 419 K/UL (ref 150–450)
PLATELET # BLD AUTO: 455 K/UL (ref 150–450)
PLATELET # BLD AUTO: 468 K/UL (ref 150–450)
PLATELET # BLD AUTO: 474 K/UL (ref 150–450)
PLATELET # BLD AUTO: 476 K/UL (ref 150–450)
PLATELET # BLD AUTO: 479 K/UL (ref 150–450)
PLATELET # BLD AUTO: 488 K/UL (ref 150–450)
PLATELET # BLD AUTO: 493 K/UL (ref 150–450)
PLATELET # BLD AUTO: 500 K/UL (ref 150–450)
PLATELET # BLD AUTO: 506 K/UL (ref 150–450)
PLATELET # BLD AUTO: 511 K/UL (ref 150–450)
PLATELET # BLD AUTO: 523 K/UL (ref 150–450)
PLATELET # BLD AUTO: 529 K/UL (ref 150–450)
PLATELET # BLD AUTO: 539 K/UL (ref 150–450)
PLATELET # BLD AUTO: 560 K/UL (ref 150–450)
PLATELET # BLD AUTO: 586 K/UL (ref 150–450)
PLATELET # BLD AUTO: 632 K/UL (ref 150–450)
PLATELET # BLD AUTO: 663 K/UL (ref 150–450)
PLATELET COMMENTS,PCOM: ADEQUATE
PMV BLD AUTO: 9.1 FL (ref 9.4–12.3)
PMV BLD AUTO: 9.2 FL (ref 9.4–12.3)
PMV BLD AUTO: 9.2 FL (ref 9.4–12.3)
PMV BLD AUTO: 9.3 FL (ref 9.4–12.3)
PMV BLD AUTO: 9.3 FL (ref 9.4–12.3)
PMV BLD AUTO: 9.4 FL (ref 9.4–12.3)
PMV BLD AUTO: 9.5 FL (ref 9.4–12.3)
PMV BLD AUTO: 9.5 FL (ref 9.4–12.3)
PMV BLD AUTO: 9.6 FL (ref 9.4–12.3)
PMV BLD AUTO: 9.6 FL (ref 9.4–12.3)
PMV BLD AUTO: 9.7 FL (ref 9.4–12.3)
PMV BLD AUTO: 9.8 FL (ref 9.4–12.3)
POTASSIUM SERPL-SCNC: 2.9 MMOL/L (ref 3.5–5.1)
POTASSIUM SERPL-SCNC: 3 MMOL/L (ref 3.5–5.1)
POTASSIUM SERPL-SCNC: 3.1 MMOL/L (ref 3.5–5.1)
POTASSIUM SERPL-SCNC: 3.2 MMOL/L (ref 3.5–5.1)
POTASSIUM SERPL-SCNC: 3.4 MMOL/L (ref 3.5–5.1)
POTASSIUM SERPL-SCNC: 3.4 MMOL/L (ref 3.5–5.1)
POTASSIUM SERPL-SCNC: 3.5 MMOL/L (ref 3.5–5.1)
POTASSIUM SERPL-SCNC: 3.6 MMOL/L (ref 3.5–5.1)
POTASSIUM SERPL-SCNC: 3.8 MMOL/L (ref 3.5–5.1)
POTASSIUM SERPL-SCNC: 4 MMOL/L (ref 3.5–5.1)
POTASSIUM SERPL-SCNC: 4 MMOL/L (ref 3.5–5.1)
POTASSIUM SERPL-SCNC: 4.3 MMOL/L (ref 3.5–5.1)
POTASSIUM SERPL-SCNC: 4.8 MMOL/L (ref 3.5–5.1)
PROT CSF-MCNC: 115 MG/DL (ref 15–45)
PROT CSF-MCNC: 195 MG/DL (ref 15–45)
PROT SERPL-MCNC: 5.6 G/DL (ref 6.3–8.2)
PROT SERPL-MCNC: 5.9 G/DL (ref 6.3–8.2)
PROT SERPL-MCNC: 6.1 G/DL (ref 6.3–8.2)
PROT SERPL-MCNC: 6.1 G/DL (ref 6.3–8.2)
PROT SERPL-MCNC: 6.2 G/DL (ref 6.3–8.2)
PROT SERPL-MCNC: 6.4 G/DL (ref 6.3–8.2)
PROT SERPL-MCNC: 6.7 G/DL (ref 6.3–8.2)
PROT SERPL-MCNC: 6.8 G/DL (ref 6.3–8.2)
PROT SERPL-MCNC: 7 G/DL (ref 6.3–8.2)
PROT SERPL-MCNC: 7.1 G/DL (ref 6.3–8.2)
PROT SERPL-MCNC: 8.1 G/DL (ref 6.3–8.2)
PROT UR STRIP-MCNC: NEGATIVE MG/DL
PROT UR STRIP-MCNC: NEGATIVE MG/DL
Q-T INTERVAL, ECG07: 344 MS
QRS DURATION, ECG06: 74 MS
QTC CALCULATION (BEZET), ECG08: 483 MS
RBC # BLD AUTO: 3.47 M/UL (ref 4.05–5.2)
RBC # BLD AUTO: 3.71 M/UL (ref 4.05–5.2)
RBC # BLD AUTO: 3.75 M/UL (ref 4.05–5.2)
RBC # BLD AUTO: 3.79 M/UL (ref 4.05–5.2)
RBC # BLD AUTO: 4.08 M/UL (ref 4.05–5.2)
RBC # BLD AUTO: 4.1 M/UL (ref 4.05–5.2)
RBC # BLD AUTO: 4.1 M/UL (ref 4.05–5.2)
RBC # BLD AUTO: 4.12 M/UL (ref 4.05–5.2)
RBC # BLD AUTO: 4.12 M/UL (ref 4.05–5.2)
RBC # BLD AUTO: 4.15 M/UL (ref 4.05–5.2)
RBC # BLD AUTO: 4.19 M/UL (ref 4.05–5.2)
RBC # BLD AUTO: 4.25 M/UL (ref 4.05–5.25)
RBC # BLD AUTO: 4.31 M/UL (ref 4.05–5.2)
RBC # BLD AUTO: 4.33 M/UL (ref 4.05–5.2)
RBC # BLD AUTO: 4.39 M/UL (ref 4.05–5.2)
RBC # BLD AUTO: 4.48 M/UL (ref 4.05–5.2)
RBC # BLD AUTO: 4.75 M/UL (ref 4.05–5.2)
RBC # BLD AUTO: 4.75 M/UL (ref 4.05–5.2)
RBC # FLD: 571 /CU MM
RBC # FLD: 9375 /CU MM
RBC #/AREA URNS HPF: ABNORMAL /HPF
RBC MORPH BLD: ABNORMAL
RBC MORPH BLD: ABNORMAL
SARS COV-2, XPGCVT: NEGATIVE
SERVICE CMNT-IMP: NORMAL
SODIUM SERPL-SCNC: 127 MMOL/L (ref 136–145)
SODIUM SERPL-SCNC: 128 MMOL/L (ref 136–145)
SODIUM SERPL-SCNC: 132 MMOL/L (ref 136–145)
SODIUM SERPL-SCNC: 134 MMOL/L (ref 136–145)
SODIUM SERPL-SCNC: 135 MMOL/L (ref 136–145)
SODIUM SERPL-SCNC: 136 MMOL/L (ref 136–145)
SODIUM SERPL-SCNC: 136 MMOL/L (ref 136–145)
SODIUM SERPL-SCNC: 137 MMOL/L (ref 136–145)
SODIUM SERPL-SCNC: 137 MMOL/L (ref 136–145)
SODIUM SERPL-SCNC: 138 MMOL/L (ref 136–145)
SODIUM SERPL-SCNC: 139 MMOL/L (ref 136–145)
SOURCE, COVRS: NORMAL
SP GR UR REFRACTOMETRY: 1 (ref 1–1.02)
SP GR UR REFRACTOMETRY: 1.02 (ref 1–1.02)
SPECIMEN SOURCE FLD: NORMAL
SPECIMEN SOURCE FLD: NORMAL
SPECIMEN SOURCE: NORMAL
TIBC SERPL-MCNC: 139 UG/DL (ref 250–450)
TRANSFERRIN SERPL-MCNC: 119 MG/DL (ref 202–364)
TUBE # CSF: 1
TUBE # CSF: 3
UROBILINOGEN UR QL STRIP.AUTO: 0.2 EU/DL (ref 0.2–1)
UROBILINOGEN UR QL STRIP.AUTO: 0.2 EU/DL (ref 0.2–1)
VENTRICULAR RATE, ECG03: 119 BPM
VIT B12 SERPL-MCNC: 458 PG/ML (ref 193–986)
WBC # BLD AUTO: 10 K/UL (ref 4.3–11.1)
WBC # BLD AUTO: 10 K/UL (ref 4.3–11.1)
WBC # BLD AUTO: 10.4 K/UL (ref 4.3–11.1)
WBC # BLD AUTO: 10.4 K/UL (ref 4.3–11.1)
WBC # BLD AUTO: 11.2 K/UL (ref 4.3–11.1)
WBC # BLD AUTO: 11.6 K/UL (ref 4.3–11.1)
WBC # BLD AUTO: 11.7 K/UL (ref 4.3–11.1)
WBC # BLD AUTO: 12.2 K/UL (ref 4.3–11.1)
WBC # BLD AUTO: 7.1 K/UL (ref 4.3–11.1)
WBC # BLD AUTO: 7.5 K/UL (ref 4.3–11.1)
WBC # BLD AUTO: 8.9 K/UL (ref 4.3–11.1)
WBC # BLD AUTO: 9 K/UL (ref 4.3–11.1)
WBC # BLD AUTO: 9 K/UL (ref 4.3–11.1)
WBC # BLD AUTO: 9.1 K/UL (ref 4.3–11.1)
WBC # BLD AUTO: 9.3 K/UL (ref 4.3–11.1)
WBC MORPH BLD: ABNORMAL
WBC URNS QL MICRO: ABNORMAL /HPF

## 2021-01-01 PROCEDURE — 94760 N-INVAS EAR/PLS OXIMETRY 1: CPT

## 2021-01-01 PROCEDURE — 36415 COLL VENOUS BLD VENIPUNCTURE: CPT

## 2021-01-01 PROCEDURE — 74011250637 HC RX REV CODE- 250/637: Performed by: NURSE PRACTITIONER

## 2021-01-01 PROCEDURE — 99232 SBSQ HOSP IP/OBS MODERATE 35: CPT | Performed by: INTERNAL MEDICINE

## 2021-01-01 PROCEDURE — 83735 ASSAY OF MAGNESIUM: CPT

## 2021-01-01 PROCEDURE — 94640 AIRWAY INHALATION TREATMENT: CPT

## 2021-01-01 PROCEDURE — 74011000250 HC RX REV CODE- 250: Performed by: NURSE PRACTITIONER

## 2021-01-01 PROCEDURE — 85025 COMPLETE CBC W/AUTO DIFF WBC: CPT

## 2021-01-01 PROCEDURE — 2709999900 HC NON-CHARGEABLE SUPPLY

## 2021-01-01 PROCEDURE — 96376 TX/PRO/DX INJ SAME DRUG ADON: CPT

## 2021-01-01 PROCEDURE — 87635 SARS-COV-2 COVID-19 AMP PRB: CPT

## 2021-01-01 PROCEDURE — APPSS45 APP SPLIT SHARED TIME 31-45 MINUTES: Performed by: NURSE PRACTITIONER

## 2021-01-01 PROCEDURE — 74011250636 HC RX REV CODE- 250/636: Performed by: NURSE PRACTITIONER

## 2021-01-01 PROCEDURE — 82746 ASSAY OF FOLIC ACID SERUM: CPT

## 2021-01-01 PROCEDURE — 80053 COMPREHEN METABOLIC PANEL: CPT

## 2021-01-01 PROCEDURE — 74011250637 HC RX REV CODE- 250/637: Performed by: INTERNAL MEDICINE

## 2021-01-01 PROCEDURE — 77030014143 HC TY PUNC LUMBR BD -A

## 2021-01-01 PROCEDURE — 96372 THER/PROPH/DIAG INJ SC/IM: CPT

## 2021-01-01 PROCEDURE — APPSS180 APP SPLIT SHARED TIME > 60 MINUTES: Performed by: NURSE PRACTITIONER

## 2021-01-01 PROCEDURE — 99222 1ST HOSP IP/OBS MODERATE 55: CPT | Performed by: NURSE PRACTITIONER

## 2021-01-01 PROCEDURE — 74011250637 HC RX REV CODE- 250/637: Performed by: FAMILY MEDICINE

## 2021-01-01 PROCEDURE — 74011250636 HC RX REV CODE- 250/636: Performed by: FAMILY MEDICINE

## 2021-01-01 PROCEDURE — 81003 URINALYSIS AUTO W/O SCOPE: CPT

## 2021-01-01 PROCEDURE — 72158 MRI LUMBAR SPINE W/O & W/DYE: CPT

## 2021-01-01 PROCEDURE — 74011000250 HC RX REV CODE- 250: Performed by: RADIOLOGY

## 2021-01-01 PROCEDURE — 97530 THERAPEUTIC ACTIVITIES: CPT

## 2021-01-01 PROCEDURE — 99218 HC RM OBSERVATION: CPT

## 2021-01-01 PROCEDURE — 51701 INSERT BLADDER CATHETER: CPT

## 2021-01-01 PROCEDURE — 82306 VITAMIN D 25 HYDROXY: CPT

## 2021-01-01 PROCEDURE — 82140 ASSAY OF AMMONIA: CPT

## 2021-01-01 PROCEDURE — 99233 SBSQ HOSP IP/OBS HIGH 50: CPT | Performed by: INTERNAL MEDICINE

## 2021-01-01 PROCEDURE — 74011250636 HC RX REV CODE- 250/636: Performed by: EMERGENCY MEDICINE

## 2021-01-01 PROCEDURE — 89050 BODY FLUID CELL COUNT: CPT

## 2021-01-01 PROCEDURE — 65390000012 HC CONDITION CODE 44 OBSERVATION

## 2021-01-01 PROCEDURE — 74011000250 HC RX REV CODE- 250: Performed by: HOSPITALIST

## 2021-01-01 PROCEDURE — 99231 SBSQ HOSP IP/OBS SF/LOW 25: CPT | Performed by: NURSE PRACTITIONER

## 2021-01-01 PROCEDURE — 65270000029 HC RM PRIVATE

## 2021-01-01 PROCEDURE — 83540 ASSAY OF IRON: CPT

## 2021-01-01 PROCEDURE — 70553 MRI BRAIN STEM W/O & W/DYE: CPT

## 2021-01-01 PROCEDURE — 77030003666 HC NDL SPINAL BD -A

## 2021-01-01 PROCEDURE — 81001 URINALYSIS AUTO W/SCOPE: CPT

## 2021-01-01 PROCEDURE — 94664 DEMO&/EVAL PT USE INHALER: CPT

## 2021-01-01 PROCEDURE — 99152 MOD SED SAME PHYS/QHP 5/>YRS: CPT

## 2021-01-01 PROCEDURE — 99239 HOSP IP/OBS DSCHRG MGMT >30: CPT | Performed by: INTERNAL MEDICINE

## 2021-01-01 PROCEDURE — A9575 INJ GADOTERATE MEGLUMI 0.1ML: HCPCS | Performed by: INTERNAL MEDICINE

## 2021-01-01 PROCEDURE — 74011250637 HC RX REV CODE- 250/637: Performed by: HOSPITALIST

## 2021-01-01 PROCEDURE — 82945 GLUCOSE OTHER FLUID: CPT

## 2021-01-01 PROCEDURE — 74018 RADEX ABDOMEN 1 VIEW: CPT

## 2021-01-01 PROCEDURE — 74011000250 HC RX REV CODE- 250: Performed by: FAMILY MEDICINE

## 2021-01-01 PROCEDURE — 70450 CT HEAD/BRAIN W/O DYE: CPT

## 2021-01-01 PROCEDURE — 82962 GLUCOSE BLOOD TEST: CPT

## 2021-01-01 PROCEDURE — 96368 THER/DIAG CONCURRENT INF: CPT

## 2021-01-01 PROCEDURE — 71045 X-RAY EXAM CHEST 1 VIEW: CPT

## 2021-01-01 PROCEDURE — 97110 THERAPEUTIC EXERCISES: CPT

## 2021-01-01 PROCEDURE — 74011000258 HC RX REV CODE- 258: Performed by: HOSPITALIST

## 2021-01-01 PROCEDURE — 0651 HSPC ROUTINE HOME CARE

## 2021-01-01 PROCEDURE — 87040 BLOOD CULTURE FOR BACTERIA: CPT

## 2021-01-01 PROCEDURE — 71250 CT THORAX DX C-: CPT

## 2021-01-01 PROCEDURE — APPNB15 APP NON BILLABLE TIME 0-15 MINS: Performed by: NURSE PRACTITIONER

## 2021-01-01 PROCEDURE — 62270 DX LMBR SPI PNXR: CPT

## 2021-01-01 PROCEDURE — 86316 IMMUNOASSAY TUMOR OTHER: CPT

## 2021-01-01 PROCEDURE — 3336500001 HSPC ELECTION

## 2021-01-01 PROCEDURE — 74011250636 HC RX REV CODE- 250/636: Performed by: INTERNAL MEDICINE

## 2021-01-01 PROCEDURE — 99222 1ST HOSP IP/OBS MODERATE 55: CPT | Performed by: INTERNAL MEDICINE

## 2021-01-01 PROCEDURE — 99223 1ST HOSP IP/OBS HIGH 75: CPT | Performed by: INTERNAL MEDICINE

## 2021-01-01 PROCEDURE — HOSPICE MEDICATION HC HH HOSPICE MEDICATION

## 2021-01-01 PROCEDURE — 72156 MRI NECK SPINE W/O & W/DYE: CPT

## 2021-01-01 PROCEDURE — 77030012341 HC CHMB SPCR OPTC MDI VYRM -A

## 2021-01-01 PROCEDURE — 74011000250 HC RX REV CODE- 250: Performed by: PHYSICIAN ASSISTANT

## 2021-01-01 PROCEDURE — 96365 THER/PROPH/DIAG IV INF INIT: CPT

## 2021-01-01 PROCEDURE — C8929 TTE W OR WO FOL WCON,DOPPLER: HCPCS

## 2021-01-01 PROCEDURE — 96375 TX/PRO/DX INJ NEW DRUG ADDON: CPT

## 2021-01-01 PROCEDURE — 82728 ASSAY OF FERRITIN: CPT

## 2021-01-01 PROCEDURE — 3331090004 HSPC SERVICE INTENSITY ADD-ON

## 2021-01-01 PROCEDURE — 77030013140 HC MSK NEB VYRM -A

## 2021-01-01 PROCEDURE — 82607 VITAMIN B-12: CPT

## 2021-01-01 PROCEDURE — 76942 ECHO GUIDE FOR BIOPSY: CPT

## 2021-01-01 PROCEDURE — 76450000000

## 2021-01-01 PROCEDURE — 74183 MRI ABD W/O CNTR FLWD CNTR: CPT

## 2021-01-01 PROCEDURE — 88341 IMHCHEM/IMCYTCHM EA ADD ANTB: CPT

## 2021-01-01 PROCEDURE — A9575 INJ GADOTERATE MEGLUMI 0.1ML: HCPCS | Performed by: HOSPITALIST

## 2021-01-01 PROCEDURE — 88112 CYTOPATH CELL ENHANCE TECH: CPT

## 2021-01-01 PROCEDURE — 84157 ASSAY OF PROTEIN OTHER: CPT

## 2021-01-01 PROCEDURE — 71260 CT THORAX DX C+: CPT

## 2021-01-01 PROCEDURE — 009U3ZZ DRAINAGE OF SPINAL CANAL, PERCUTANEOUS APPROACH: ICD-10-PCS | Performed by: RADIOLOGY

## 2021-01-01 PROCEDURE — 86301 IMMUNOASSAY TUMOR CA 19-9: CPT

## 2021-01-01 PROCEDURE — 93005 ELECTROCARDIOGRAM TRACING: CPT | Performed by: EMERGENCY MEDICINE

## 2021-01-01 PROCEDURE — 82150 ASSAY OF AMYLASE: CPT

## 2021-01-01 PROCEDURE — 83605 ASSAY OF LACTIC ACID: CPT

## 2021-01-01 PROCEDURE — 96360 HYDRATION IV INFUSION INIT: CPT

## 2021-01-01 PROCEDURE — 96374 THER/PROPH/DIAG INJ IV PUSH: CPT

## 2021-01-01 PROCEDURE — 97161 PT EVAL LOW COMPLEX 20 MIN: CPT

## 2021-01-01 PROCEDURE — 86300 IMMUNOASSAY TUMOR CA 15-3: CPT

## 2021-01-01 PROCEDURE — 83615 LACTATE (LD) (LDH) ENZYME: CPT

## 2021-01-01 PROCEDURE — G0155 HHCP-SVS OF CSW,EA 15 MIN: HCPCS

## 2021-01-01 PROCEDURE — 74011250636 HC RX REV CODE- 250/636: Performed by: HOSPITALIST

## 2021-01-01 PROCEDURE — 83690 ASSAY OF LIPASE: CPT

## 2021-01-01 PROCEDURE — 99284 EMERGENCY DEPT VISIT MOD MDM: CPT

## 2021-01-01 PROCEDURE — A9503 TC99M MEDRONATE: HCPCS

## 2021-01-01 PROCEDURE — 82248 BILIRUBIN DIRECT: CPT

## 2021-01-01 PROCEDURE — 72157 MRI CHEST SPINE W/O & W/DYE: CPT

## 2021-01-01 PROCEDURE — G0299 HHS/HOSPICE OF RN EA 15 MIN: HCPCS

## 2021-01-01 PROCEDURE — 87205 SMEAR GRAM STAIN: CPT

## 2021-01-01 PROCEDURE — 74011000250 HC RX REV CODE- 250: Performed by: INTERNAL MEDICINE

## 2021-01-01 PROCEDURE — 87327 CRYPTOCOCCUS NEOFORM AG IA: CPT

## 2021-01-01 PROCEDURE — 74011250636 HC RX REV CODE- 250/636: Performed by: RADIOLOGY

## 2021-01-01 PROCEDURE — 87483 CNS DNA AMP PROBE TYPE 12-25: CPT

## 2021-01-01 PROCEDURE — 88342 IMHCHEM/IMCYTCHM 1ST ANTB: CPT

## 2021-01-01 PROCEDURE — 88360 TUMOR IMMUNOHISTOCHEM/MANUAL: CPT

## 2021-01-01 PROCEDURE — 82105 ALPHA-FETOPROTEIN SERUM: CPT

## 2021-01-01 PROCEDURE — 82378 CARCINOEMBRYONIC ANTIGEN: CPT

## 2021-01-01 PROCEDURE — 88307 TISSUE EXAM BY PATHOLOGIST: CPT

## 2021-01-01 PROCEDURE — 87086 URINE CULTURE/COLONY COUNT: CPT

## 2021-01-01 PROCEDURE — 62272 THER SPI PNXR DRG CSF: CPT

## 2021-01-01 PROCEDURE — 74011000636 HC RX REV CODE- 636: Performed by: HOSPITALIST

## 2021-01-01 RX ORDER — AMLODIPINE BESYLATE 5 MG/1
5 TABLET ORAL DAILY
Status: DISCONTINUED | OUTPATIENT
Start: 2021-01-01 | End: 2021-01-01

## 2021-01-01 RX ORDER — LIDOCAINE HYDROCHLORIDE 20 MG/ML
15 SOLUTION OROPHARYNGEAL
Status: DISCONTINUED | OUTPATIENT
Start: 2021-01-01 | End: 2021-01-01 | Stop reason: HOSPADM

## 2021-01-01 RX ORDER — LEVOTHYROXINE SODIUM 88 UG/1
88 TABLET ORAL
Status: DISCONTINUED | OUTPATIENT
Start: 2021-01-01 | End: 2021-01-01 | Stop reason: HOSPADM

## 2021-01-01 RX ORDER — LOSARTAN POTASSIUM 50 MG/1
50 TABLET ORAL ONCE
Status: COMPLETED | OUTPATIENT
Start: 2021-01-01 | End: 2021-01-01

## 2021-01-01 RX ORDER — ENOXAPARIN SODIUM 100 MG/ML
40 INJECTION SUBCUTANEOUS DAILY
Status: DISCONTINUED | OUTPATIENT
Start: 2021-01-01 | End: 2021-01-01 | Stop reason: HOSPADM

## 2021-01-01 RX ORDER — DEXAMETHASONE 4 MG/1
4 TABLET ORAL 3 TIMES DAILY
Status: DISCONTINUED | OUTPATIENT
Start: 2021-01-01 | End: 2021-01-01

## 2021-01-01 RX ORDER — SODIUM CHLORIDE 9 MG/ML
1000 INJECTION, SOLUTION INTRAVENOUS ONCE
Status: COMPLETED | OUTPATIENT
Start: 2021-01-01 | End: 2021-01-01

## 2021-01-01 RX ORDER — POTASSIUM CHLORIDE 14.9 MG/ML
20 INJECTION INTRAVENOUS
Status: COMPLETED | OUTPATIENT
Start: 2021-01-01 | End: 2021-01-01

## 2021-01-01 RX ORDER — CIPROFLOXACIN 500 MG/1
500 TABLET ORAL EVERY 12 HOURS
Status: DISCONTINUED | OUTPATIENT
Start: 2021-01-01 | End: 2021-01-01 | Stop reason: HOSPADM

## 2021-01-01 RX ORDER — SODIUM CHLORIDE 0.9 % (FLUSH) 0.9 %
10 SYRINGE (ML) INJECTION
Status: COMPLETED | OUTPATIENT
Start: 2021-01-01 | End: 2021-01-01

## 2021-01-01 RX ORDER — TRAMADOL HYDROCHLORIDE 50 MG/1
50 TABLET ORAL
Status: DISCONTINUED | OUTPATIENT
Start: 2021-01-01 | End: 2021-01-01 | Stop reason: HOSPADM

## 2021-01-01 RX ORDER — ONDANSETRON 2 MG/ML
4 INJECTION INTRAMUSCULAR; INTRAVENOUS
Status: DISCONTINUED | OUTPATIENT
Start: 2021-01-01 | End: 2021-01-01 | Stop reason: SDUPTHER

## 2021-01-01 RX ORDER — BUDESONIDE AND FORMOTEROL FUMARATE DIHYDRATE 160; 4.5 UG/1; UG/1
2 AEROSOL RESPIRATORY (INHALATION)
Status: DISCONTINUED | OUTPATIENT
Start: 2021-01-01 | End: 2021-01-01 | Stop reason: HOSPADM

## 2021-01-01 RX ORDER — MORPHINE SULFATE 15 MG/1
15 TABLET, FILM COATED, EXTENDED RELEASE ORAL EVERY 12 HOURS
Qty: 14 TAB | Refills: 0 | Status: SHIPPED | OUTPATIENT
Start: 2021-01-01 | End: 2021-01-01

## 2021-01-01 RX ORDER — NYSTATIN 100000 [USP'U]/ML
500000 SUSPENSION ORAL 4 TIMES DAILY
Status: DISCONTINUED | OUTPATIENT
Start: 2021-01-01 | End: 2021-01-01 | Stop reason: HOSPADM

## 2021-01-01 RX ORDER — SODIUM CHLORIDE 9 MG/ML
75 INJECTION, SOLUTION INTRAVENOUS CONTINUOUS
Status: DISCONTINUED | OUTPATIENT
Start: 2021-01-01 | End: 2021-01-01

## 2021-01-01 RX ORDER — FLUOXETINE HYDROCHLORIDE 20 MG/1
20 CAPSULE ORAL
Status: DISCONTINUED | OUTPATIENT
Start: 2021-01-01 | End: 2021-01-01

## 2021-01-01 RX ORDER — OXYCODONE HYDROCHLORIDE 5 MG/1
5 TABLET ORAL
Status: DISCONTINUED | OUTPATIENT
Start: 2021-01-01 | End: 2021-01-01 | Stop reason: HOSPADM

## 2021-01-01 RX ORDER — FLUTICASONE PROPIONATE 50 MCG
2 SPRAY, SUSPENSION (ML) NASAL DAILY
Status: DISCONTINUED | OUTPATIENT
Start: 2021-01-01 | End: 2021-01-01 | Stop reason: HOSPADM

## 2021-01-01 RX ORDER — FENTANYL CITRATE 50 UG/ML
25-50 INJECTION, SOLUTION INTRAMUSCULAR; INTRAVENOUS
Status: DISCONTINUED | OUTPATIENT
Start: 2021-01-01 | End: 2021-01-01 | Stop reason: HOSPADM

## 2021-01-01 RX ORDER — PANTOPRAZOLE SODIUM 40 MG/1
40 TABLET, DELAYED RELEASE ORAL
Status: DISCONTINUED | OUTPATIENT
Start: 2021-01-01 | End: 2021-01-01 | Stop reason: HOSPADM

## 2021-01-01 RX ORDER — GADOTERATE MEGLUMINE 376.9 MG/ML
12 INJECTION INTRAVENOUS
Status: COMPLETED | OUTPATIENT
Start: 2021-01-01 | End: 2021-01-01

## 2021-01-01 RX ORDER — LIDOCAINE HYDROCHLORIDE 20 MG/ML
0.2 INJECTION, SOLUTION INFILTRATION; PERINEURAL ONCE
Status: COMPLETED | OUTPATIENT
Start: 2021-01-01 | End: 2021-01-01

## 2021-01-01 RX ORDER — PROMETHAZINE HYDROCHLORIDE 25 MG/1
12.5 TABLET ORAL
Status: DISCONTINUED | OUTPATIENT
Start: 2021-01-01 | End: 2021-01-01 | Stop reason: HOSPADM

## 2021-01-01 RX ORDER — PRAVASTATIN SODIUM 20 MG/1
40 TABLET ORAL DAILY
Status: DISCONTINUED | OUTPATIENT
Start: 2021-01-01 | End: 2021-01-01 | Stop reason: HOSPADM

## 2021-01-01 RX ORDER — SODIUM CHLORIDE 9 MG/ML
150 INJECTION, SOLUTION INTRAVENOUS CONTINUOUS
Status: DISCONTINUED | OUTPATIENT
Start: 2021-01-01 | End: 2021-01-01 | Stop reason: HOSPADM

## 2021-01-01 RX ORDER — METRONIDAZOLE 500 MG/100ML
500 INJECTION, SOLUTION INTRAVENOUS
Status: COMPLETED | OUTPATIENT
Start: 2021-01-01 | End: 2021-01-01

## 2021-01-01 RX ORDER — SODIUM CHLORIDE 0.9 % (FLUSH) 0.9 %
5-40 SYRINGE (ML) INJECTION AS NEEDED
Status: DISCONTINUED | OUTPATIENT
Start: 2021-01-01 | End: 2021-01-01 | Stop reason: HOSPADM

## 2021-01-01 RX ORDER — LOSARTAN POTASSIUM 50 MG/1
100 TABLET ORAL DAILY
Status: DISCONTINUED | OUTPATIENT
Start: 2021-01-01 | End: 2021-01-01 | Stop reason: HOSPADM

## 2021-01-01 RX ORDER — LIDOCAINE HYDROCHLORIDE 20 MG/ML
2-20 INJECTION, SOLUTION INFILTRATION; PERINEURAL ONCE
Status: COMPLETED | OUTPATIENT
Start: 2021-01-01 | End: 2021-01-01

## 2021-01-01 RX ORDER — MIDAZOLAM HYDROCHLORIDE 1 MG/ML
.5-2 INJECTION, SOLUTION INTRAMUSCULAR; INTRAVENOUS
Status: DISCONTINUED | OUTPATIENT
Start: 2021-01-01 | End: 2021-01-01 | Stop reason: HOSPADM

## 2021-01-01 RX ORDER — MAGNESIUM SULFATE HEPTAHYDRATE 40 MG/ML
2 INJECTION, SOLUTION INTRAVENOUS 2 TIMES DAILY
Status: COMPLETED | OUTPATIENT
Start: 2021-01-01 | End: 2021-01-01

## 2021-01-01 RX ORDER — DIAZEPAM 5 MG/1
5 TABLET ORAL
Status: DISCONTINUED | OUTPATIENT
Start: 2021-01-01 | End: 2021-01-01 | Stop reason: HOSPADM

## 2021-01-01 RX ORDER — HYDROCHLOROTHIAZIDE 25 MG/1
25 TABLET ORAL DAILY
Status: DISCONTINUED | OUTPATIENT
Start: 2021-01-01 | End: 2021-01-01 | Stop reason: HOSPADM

## 2021-01-01 RX ORDER — AMLODIPINE BESYLATE 10 MG/1
10 TABLET ORAL DAILY
Status: DISCONTINUED | OUTPATIENT
Start: 2021-01-01 | End: 2021-01-01 | Stop reason: HOSPADM

## 2021-01-01 RX ORDER — CIPROFLOXACIN 2 MG/ML
400 INJECTION, SOLUTION INTRAVENOUS EVERY 12 HOURS
Status: DISCONTINUED | OUTPATIENT
Start: 2021-01-01 | End: 2021-01-01 | Stop reason: ALTCHOICE

## 2021-01-01 RX ORDER — LOSARTAN POTASSIUM 50 MG/1
50 TABLET ORAL DAILY
Status: DISCONTINUED | OUTPATIENT
Start: 2021-01-01 | End: 2021-01-01

## 2021-01-01 RX ORDER — GADOTERATE MEGLUMINE 376.9 MG/ML
11 INJECTION INTRAVENOUS
Status: COMPLETED | OUTPATIENT
Start: 2021-01-01 | End: 2021-01-01

## 2021-01-01 RX ORDER — MORPHINE SULFATE 2 MG/ML
2 INJECTION, SOLUTION INTRAMUSCULAR; INTRAVENOUS
Status: DISCONTINUED | OUTPATIENT
Start: 2021-01-01 | End: 2021-01-01 | Stop reason: HOSPADM

## 2021-01-01 RX ORDER — SODIUM CHLORIDE 9 MG/ML
25 INJECTION, SOLUTION INTRAVENOUS CONTINUOUS
Status: DISCONTINUED | OUTPATIENT
Start: 2021-01-01 | End: 2021-01-01 | Stop reason: HOSPADM

## 2021-01-01 RX ORDER — ENOXAPARIN SODIUM 100 MG/ML
40 INJECTION SUBCUTANEOUS EVERY 24 HOURS
Status: DISCONTINUED | OUTPATIENT
Start: 2021-01-01 | End: 2021-01-01 | Stop reason: HOSPADM

## 2021-01-01 RX ORDER — ONDANSETRON 2 MG/ML
4 INJECTION INTRAMUSCULAR; INTRAVENOUS
Status: DISCONTINUED | OUTPATIENT
Start: 2021-01-01 | End: 2021-01-01 | Stop reason: HOSPADM

## 2021-01-01 RX ORDER — LOSARTAN POTASSIUM 100 MG/1
100 TABLET ORAL DAILY
Qty: 30 TAB | Refills: 1 | Status: SHIPPED | OUTPATIENT
Start: 2021-01-01 | End: 2021-01-01

## 2021-01-01 RX ORDER — TRAMADOL HYDROCHLORIDE 50 MG/1
50 TABLET ORAL
Qty: 28 TAB | Refills: 0 | Status: SHIPPED | OUTPATIENT
Start: 2021-01-01 | End: 2021-01-01

## 2021-01-01 RX ORDER — MORPHINE SULFATE 15 MG/1
15 TABLET, FILM COATED, EXTENDED RELEASE ORAL EVERY 12 HOURS
Status: DISCONTINUED | OUTPATIENT
Start: 2021-01-01 | End: 2021-01-01 | Stop reason: HOSPADM

## 2021-01-01 RX ORDER — DEXAMETHASONE 4 MG/1
6 TABLET ORAL DAILY
Status: DISCONTINUED | OUTPATIENT
Start: 2021-01-01 | End: 2021-01-01 | Stop reason: HOSPADM

## 2021-01-01 RX ORDER — LORAZEPAM 1 MG/1
1 TABLET ORAL
Status: DISCONTINUED | OUTPATIENT
Start: 2021-01-01 | End: 2021-01-01 | Stop reason: HOSPADM

## 2021-01-01 RX ORDER — PROPRANOLOL HYDROCHLORIDE 60 MG/1
120 CAPSULE, EXTENDED RELEASE ORAL DAILY
Status: DISCONTINUED | OUTPATIENT
Start: 2021-01-01 | End: 2021-01-01 | Stop reason: HOSPADM

## 2021-01-01 RX ORDER — CIPROFLOXACIN 500 MG/1
500 TABLET ORAL EVERY 12 HOURS
Qty: 2 TAB | Refills: 0 | Status: SHIPPED | OUTPATIENT
Start: 2021-01-01 | End: 2021-01-01

## 2021-01-01 RX ORDER — LEVOTHYROXINE SODIUM 88 UG/1
88 TABLET ORAL
Qty: 3030 TAB | Refills: 1 | Status: SHIPPED | OUTPATIENT
Start: 2021-01-01 | End: 2021-01-01

## 2021-01-01 RX ORDER — HYDRALAZINE HYDROCHLORIDE 20 MG/ML
10 INJECTION INTRAMUSCULAR; INTRAVENOUS
Status: DISCONTINUED | OUTPATIENT
Start: 2021-01-01 | End: 2021-01-01 | Stop reason: HOSPADM

## 2021-01-01 RX ORDER — DEXAMETHASONE 4 MG/1
6 TABLET ORAL 3 TIMES DAILY
Status: DISCONTINUED | OUTPATIENT
Start: 2021-01-01 | End: 2021-01-01

## 2021-01-01 RX ORDER — SUCRALFATE 1 G/1
1 TABLET ORAL 4 TIMES DAILY
Status: DISCONTINUED | OUTPATIENT
Start: 2021-01-01 | End: 2021-01-01 | Stop reason: HOSPADM

## 2021-01-01 RX ORDER — LIDOCAINE HYDROCHLORIDE 20 MG/ML
1-20 INJECTION, SOLUTION INFILTRATION; PERINEURAL ONCE
Status: COMPLETED | OUTPATIENT
Start: 2021-01-01 | End: 2021-01-01

## 2021-01-01 RX ORDER — POTASSIUM CHLORIDE 14.9 MG/ML
20 INJECTION INTRAVENOUS ONCE
Status: COMPLETED | OUTPATIENT
Start: 2021-01-01 | End: 2021-01-01

## 2021-01-01 RX ORDER — AMOXICILLIN 250 MG
2 CAPSULE ORAL 2 TIMES DAILY
Status: DISCONTINUED | OUTPATIENT
Start: 2021-01-01 | End: 2021-01-01 | Stop reason: HOSPADM

## 2021-01-01 RX ORDER — ACETAMINOPHEN 325 MG/1
650 TABLET ORAL
Status: DISCONTINUED | OUTPATIENT
Start: 2021-01-01 | End: 2021-01-01 | Stop reason: HOSPADM

## 2021-01-01 RX ORDER — FLUOXETINE HYDROCHLORIDE 20 MG/1
60 CAPSULE ORAL
Status: DISCONTINUED | OUTPATIENT
Start: 2021-01-01 | End: 2021-01-01 | Stop reason: HOSPADM

## 2021-01-01 RX ORDER — SODIUM CHLORIDE 0.9 % (FLUSH) 0.9 %
5-40 SYRINGE (ML) INJECTION EVERY 8 HOURS
Status: DISCONTINUED | OUTPATIENT
Start: 2021-01-01 | End: 2021-01-01 | Stop reason: HOSPADM

## 2021-01-01 RX ORDER — MAG HYDROX/ALUMINUM HYD/SIMETH 200-200-20
30 SUSPENSION, ORAL (FINAL DOSE FORM) ORAL
Status: DISCONTINUED | OUTPATIENT
Start: 2021-01-01 | End: 2021-01-01 | Stop reason: HOSPADM

## 2021-01-01 RX ORDER — OXYCODONE HYDROCHLORIDE 5 MG/1
5 TABLET ORAL
Qty: 30 TAB | Refills: 0 | Status: SHIPPED | OUTPATIENT
Start: 2021-01-01 | End: 2021-01-01

## 2021-01-01 RX ORDER — LORAZEPAM 1 MG/1
1 TABLET ORAL
Qty: 30 TAB | Refills: 0 | Status: SHIPPED | OUTPATIENT
Start: 2021-01-01

## 2021-01-01 RX ORDER — POTASSIUM CHLORIDE 20 MEQ/1
20 TABLET, EXTENDED RELEASE ORAL 2 TIMES DAILY
Status: DISCONTINUED | OUTPATIENT
Start: 2021-01-01 | End: 2021-01-01 | Stop reason: HOSPADM

## 2021-01-01 RX ORDER — LEVOTHYROXINE SODIUM 100 UG/1
100 TABLET ORAL
Status: DISCONTINUED | OUTPATIENT
Start: 2021-01-01 | End: 2021-01-01 | Stop reason: DRUGHIGH

## 2021-01-01 RX ORDER — PRAVASTATIN SODIUM 20 MG/1
40 TABLET ORAL
Status: DISCONTINUED | OUTPATIENT
Start: 2021-01-01 | End: 2021-01-01 | Stop reason: HOSPADM

## 2021-01-01 RX ORDER — ACETAMINOPHEN 650 MG/1
650 SUPPOSITORY RECTAL
Status: DISCONTINUED | OUTPATIENT
Start: 2021-01-01 | End: 2021-01-01 | Stop reason: HOSPADM

## 2021-01-01 RX ORDER — LORAZEPAM 2 MG/ML
1 INJECTION INTRAMUSCULAR ONCE
Status: COMPLETED | OUTPATIENT
Start: 2021-01-01 | End: 2021-01-01

## 2021-01-01 RX ORDER — NALOXONE HYDROCHLORIDE 4 MG/.1ML
SPRAY NASAL
Qty: 1 EACH | Refills: 0 | Status: SHIPPED | OUTPATIENT
Start: 2021-01-01 | End: 2021-01-01

## 2021-01-01 RX ORDER — POTASSIUM CHLORIDE 20 MEQ/1
40 TABLET, EXTENDED RELEASE ORAL 2 TIMES DAILY
Status: COMPLETED | OUTPATIENT
Start: 2021-01-01 | End: 2021-01-01

## 2021-01-01 RX ORDER — CIPROFLOXACIN 2 MG/ML
400 INJECTION, SOLUTION INTRAVENOUS
Status: COMPLETED | OUTPATIENT
Start: 2021-01-01 | End: 2021-01-01

## 2021-01-01 RX ORDER — ALBUTEROL SULFATE 0.83 MG/ML
2.5 SOLUTION RESPIRATORY (INHALATION)
Status: DISCONTINUED | OUTPATIENT
Start: 2021-01-01 | End: 2021-01-01 | Stop reason: HOSPADM

## 2021-01-01 RX ORDER — PANTOPRAZOLE SODIUM 40 MG/1
40 TABLET, DELAYED RELEASE ORAL
Status: DISCONTINUED | OUTPATIENT
Start: 2021-01-01 | End: 2021-01-01

## 2021-01-01 RX ORDER — FLUOXETINE HYDROCHLORIDE 20 MG/1
20 CAPSULE ORAL
Status: DISCONTINUED | OUTPATIENT
Start: 2021-01-01 | End: 2021-01-01 | Stop reason: HOSPADM

## 2021-01-01 RX ORDER — POLYETHYLENE GLYCOL 3350 17 G/17G
17 POWDER, FOR SOLUTION ORAL DAILY PRN
Status: DISCONTINUED | OUTPATIENT
Start: 2021-01-01 | End: 2021-01-01 | Stop reason: HOSPADM

## 2021-01-01 RX ORDER — FAMOTIDINE 20 MG/1
40 TABLET, FILM COATED ORAL DAILY
Status: DISCONTINUED | OUTPATIENT
Start: 2021-01-01 | End: 2021-01-01 | Stop reason: HOSPADM

## 2021-01-01 RX ORDER — LANOLIN ALCOHOL/MO/W.PET/CERES
400 CREAM (GRAM) TOPICAL 3 TIMES DAILY
Status: DISCONTINUED | OUTPATIENT
Start: 2021-01-01 | End: 2021-01-01 | Stop reason: HOSPADM

## 2021-01-01 RX ORDER — ONDANSETRON 2 MG/ML
8 INJECTION INTRAMUSCULAR; INTRAVENOUS
Status: DISCONTINUED | OUTPATIENT
Start: 2021-01-01 | End: 2021-01-01 | Stop reason: HOSPADM

## 2021-01-01 RX ORDER — AMOXICILLIN 250 MG
1 CAPSULE ORAL DAILY
Status: DISCONTINUED | OUTPATIENT
Start: 2021-01-01 | End: 2021-01-01

## 2021-01-01 RX ORDER — FLUOXETINE HYDROCHLORIDE 20 MG/1
60 CAPSULE ORAL
Status: DISCONTINUED | OUTPATIENT
Start: 2021-01-01 | End: 2021-01-01

## 2021-01-01 RX ORDER — NALOXONE HYDROCHLORIDE 0.4 MG/ML
0.4 INJECTION, SOLUTION INTRAMUSCULAR; INTRAVENOUS; SUBCUTANEOUS AS NEEDED
Status: DISCONTINUED | OUTPATIENT
Start: 2021-01-01 | End: 2021-01-01 | Stop reason: HOSPADM

## 2021-01-01 RX ADMIN — PRAVASTATIN SODIUM 40 MG: 20 TABLET ORAL at 21:06

## 2021-01-01 RX ADMIN — Medication 10 ML: at 21:39

## 2021-01-01 RX ADMIN — SODIUM CHLORIDE 150 ML/HR: 900 INJECTION, SOLUTION INTRAVENOUS at 18:07

## 2021-01-01 RX ADMIN — GADOTERATE MEGLUMINE 11 ML: 376.9 INJECTION INTRAVENOUS at 15:40

## 2021-01-01 RX ADMIN — FLUTICASONE PROPIONATE 2 SPRAY: 50 SPRAY, METERED NASAL at 09:29

## 2021-01-01 RX ADMIN — Medication 10 ML: at 05:15

## 2021-01-01 RX ADMIN — LACTULOSE 15 ML: 20 SOLUTION ORAL at 08:33

## 2021-01-01 RX ADMIN — Medication 10 ML: at 14:14

## 2021-01-01 RX ADMIN — PANTOPRAZOLE SODIUM 40 MG: 40 TABLET, DELAYED RELEASE ORAL at 17:39

## 2021-01-01 RX ADMIN — LEVOTHYROXINE SODIUM 88 MCG: 0.09 TABLET ORAL at 08:34

## 2021-01-01 RX ADMIN — Medication 10 ML: at 07:15

## 2021-01-01 RX ADMIN — MORPHINE SULFATE 15 MG: 15 TABLET, FILM COATED, EXTENDED RELEASE ORAL at 17:50

## 2021-01-01 RX ADMIN — HYDRALAZINE HYDROCHLORIDE 10 MG: 20 INJECTION INTRAMUSCULAR; INTRAVENOUS at 20:26

## 2021-01-01 RX ADMIN — Medication 10 ML: at 05:16

## 2021-01-01 RX ADMIN — DEXAMETHASONE 6 MG: 4 TABLET ORAL at 15:43

## 2021-01-01 RX ADMIN — PROPRANOLOL HYDROCHLORIDE 120 MG: 60 CAPSULE, EXTENDED RELEASE ORAL at 12:28

## 2021-01-01 RX ADMIN — PANTOPRAZOLE SODIUM 40 MG: 40 TABLET, DELAYED RELEASE ORAL at 16:12

## 2021-01-01 RX ADMIN — Medication 400 MG: at 08:23

## 2021-01-01 RX ADMIN — TRAMADOL HYDROCHLORIDE 50 MG: 50 TABLET, FILM COATED ORAL at 23:14

## 2021-01-01 RX ADMIN — ACETAMINOPHEN 650 MG: 325 TABLET, FILM COATED ORAL at 08:40

## 2021-01-01 RX ADMIN — BUDESONIDE AND FORMOTEROL FUMARATE DIHYDRATE 2 PUFF: 160; 4.5 AEROSOL RESPIRATORY (INHALATION) at 20:25

## 2021-01-01 RX ADMIN — DEXAMETHASONE 6 MG: 4 TABLET ORAL at 17:38

## 2021-01-01 RX ADMIN — FLUOXETINE 20 MG: 20 CAPSULE ORAL at 21:11

## 2021-01-01 RX ADMIN — Medication 10 ML: at 16:13

## 2021-01-01 RX ADMIN — SODIUM CHLORIDE 150 ML/HR: 900 INJECTION, SOLUTION INTRAVENOUS at 01:40

## 2021-01-01 RX ADMIN — SODIUM CHLORIDE 25 ML/HR: 900 INJECTION, SOLUTION INTRAVENOUS at 08:38

## 2021-01-01 RX ADMIN — BUDESONIDE AND FORMOTEROL FUMARATE DIHYDRATE 2 PUFF: 160; 4.5 AEROSOL RESPIRATORY (INHALATION) at 08:00

## 2021-01-01 RX ADMIN — PROPRANOLOL HYDROCHLORIDE 120 MG: 60 CAPSULE, EXTENDED RELEASE ORAL at 08:58

## 2021-01-01 RX ADMIN — SODIUM CHLORIDE 150 ML/HR: 900 INJECTION, SOLUTION INTRAVENOUS at 00:59

## 2021-01-01 RX ADMIN — DEXAMETHASONE 4 MG: 4 TABLET ORAL at 08:58

## 2021-01-01 RX ADMIN — BUDESONIDE AND FORMOTEROL FUMARATE DIHYDRATE 2 PUFF: 160; 4.5 AEROSOL RESPIRATORY (INHALATION) at 08:05

## 2021-01-01 RX ADMIN — SODIUM CHLORIDE 150 ML/HR: 900 INJECTION, SOLUTION INTRAVENOUS at 05:20

## 2021-01-01 RX ADMIN — PRAVASTATIN SODIUM 40 MG: 20 TABLET ORAL at 08:35

## 2021-01-01 RX ADMIN — FLUTICASONE PROPIONATE 2 SPRAY: 50 SPRAY, METERED NASAL at 08:35

## 2021-01-01 RX ADMIN — POTASSIUM CHLORIDE 20 MEQ: 14.9 INJECTION, SOLUTION INTRAVENOUS at 12:36

## 2021-01-01 RX ADMIN — BUDESONIDE AND FORMOTEROL FUMARATE DIHYDRATE 2 PUFF: 160; 4.5 AEROSOL RESPIRATORY (INHALATION) at 21:45

## 2021-01-01 RX ADMIN — HYDROCHLOROTHIAZIDE 25 MG: 25 TABLET ORAL at 07:55

## 2021-01-01 RX ADMIN — AMLODIPINE BESYLATE 5 MG: 5 TABLET ORAL at 07:39

## 2021-01-01 RX ADMIN — LEVOTHYROXINE SODIUM 88 MCG: 0.09 TABLET ORAL at 09:25

## 2021-01-01 RX ADMIN — NYSTATIN 500000 UNITS: 100000 SUSPENSION ORAL at 21:34

## 2021-01-01 RX ADMIN — SODIUM CHLORIDE 75 ML/HR: 900 INJECTION, SOLUTION INTRAVENOUS at 23:00

## 2021-01-01 RX ADMIN — SODIUM CHLORIDE 100 ML: 900 INJECTION, SOLUTION INTRAVENOUS at 09:02

## 2021-01-01 RX ADMIN — PROPRANOLOL HYDROCHLORIDE 120 MG: 60 CAPSULE, EXTENDED RELEASE ORAL at 07:40

## 2021-01-01 RX ADMIN — DEXAMETHASONE 6 MG: 4 TABLET ORAL at 15:33

## 2021-01-01 RX ADMIN — HYDRALAZINE HYDROCHLORIDE 10 MG: 20 INJECTION INTRAMUSCULAR; INTRAVENOUS at 17:08

## 2021-01-01 RX ADMIN — POTASSIUM CHLORIDE 40 MEQ: 1500 TABLET, EXTENDED RELEASE ORAL at 17:12

## 2021-01-01 RX ADMIN — PANTOPRAZOLE SODIUM 40 MG: 40 TABLET, DELAYED RELEASE ORAL at 08:35

## 2021-01-01 RX ADMIN — FLUOXETINE 60 MG: 20 CAPSULE ORAL at 22:01

## 2021-01-01 RX ADMIN — POTASSIUM CHLORIDE 20 MEQ: 20 TABLET, EXTENDED RELEASE ORAL at 08:22

## 2021-01-01 RX ADMIN — PROPRANOLOL HYDROCHLORIDE 120 MG: 60 CAPSULE, EXTENDED RELEASE ORAL at 07:17

## 2021-01-01 RX ADMIN — AMLODIPINE BESYLATE 5 MG: 5 TABLET ORAL at 18:11

## 2021-01-01 RX ADMIN — ONDANSETRON 8 MG: 2 INJECTION INTRAMUSCULAR; INTRAVENOUS at 13:25

## 2021-01-01 RX ADMIN — SUCRALFATE 1 G: 1 TABLET ORAL at 16:13

## 2021-01-01 RX ADMIN — POTASSIUM CHLORIDE 20 MEQ: 20 TABLET, EXTENDED RELEASE ORAL at 16:20

## 2021-01-01 RX ADMIN — PRAVASTATIN SODIUM 40 MG: 20 TABLET ORAL at 21:32

## 2021-01-01 RX ADMIN — NYSTATIN 500000 UNITS: 100000 SUSPENSION ORAL at 22:24

## 2021-01-01 RX ADMIN — ONDANSETRON 8 MG: 2 INJECTION INTRAMUSCULAR; INTRAVENOUS at 11:47

## 2021-01-01 RX ADMIN — NYSTATIN 500000 UNITS: 100000 SUSPENSION ORAL at 17:38

## 2021-01-01 RX ADMIN — FLUOXETINE 60 MG: 20 CAPSULE ORAL at 21:50

## 2021-01-01 RX ADMIN — FLUOXETINE 60 MG: 20 CAPSULE ORAL at 20:26

## 2021-01-01 RX ADMIN — Medication 10 ML: at 20:25

## 2021-01-01 RX ADMIN — SUCRALFATE 1 G: 1 TABLET ORAL at 17:12

## 2021-01-01 RX ADMIN — LOSARTAN POTASSIUM 50 MG: 50 TABLET, FILM COATED ORAL at 08:33

## 2021-01-01 RX ADMIN — PRAVASTATIN SODIUM 40 MG: 20 TABLET ORAL at 07:55

## 2021-01-01 RX ADMIN — LEVOTHYROXINE SODIUM 88 MCG: 0.09 TABLET ORAL at 05:14

## 2021-01-01 RX ADMIN — ENOXAPARIN SODIUM 40 MG: 40 INJECTION SUBCUTANEOUS at 17:38

## 2021-01-01 RX ADMIN — Medication 10 ML: at 21:09

## 2021-01-01 RX ADMIN — SODIUM CHLORIDE 1000 ML: 900 INJECTION, SOLUTION INTRAVENOUS at 15:10

## 2021-01-01 RX ADMIN — FAMOTIDINE 40 MG: 20 TABLET, FILM COATED ORAL at 07:17

## 2021-01-01 RX ADMIN — MORPHINE SULFATE 15 MG: 15 TABLET, FILM COATED, EXTENDED RELEASE ORAL at 17:40

## 2021-01-01 RX ADMIN — DOCUSATE SODIUM 50MG AND SENNOSIDES 8.6MG 2 TABLET: 8.6; 5 TABLET, FILM COATED ORAL at 07:17

## 2021-01-01 RX ADMIN — LOSARTAN POTASSIUM 100 MG: 50 TABLET, FILM COATED ORAL at 07:17

## 2021-01-01 RX ADMIN — SODIUM CHLORIDE 75 ML/HR: 900 INJECTION, SOLUTION INTRAVENOUS at 10:20

## 2021-01-01 RX ADMIN — HYDRALAZINE HYDROCHLORIDE 10 MG: 20 INJECTION INTRAMUSCULAR; INTRAVENOUS at 05:15

## 2021-01-01 RX ADMIN — HYDRALAZINE HYDROCHLORIDE 10 MG: 20 INJECTION INTRAMUSCULAR; INTRAVENOUS at 22:41

## 2021-01-01 RX ADMIN — MORPHINE SULFATE 2 MG: 2 INJECTION, SOLUTION INTRAMUSCULAR; INTRAVENOUS at 07:53

## 2021-01-01 RX ADMIN — SUCRALFATE 1 G: 1 TABLET ORAL at 20:24

## 2021-01-01 RX ADMIN — PANTOPRAZOLE SODIUM 40 MG: 40 TABLET, DELAYED RELEASE ORAL at 05:37

## 2021-01-01 RX ADMIN — ENOXAPARIN SODIUM 40 MG: 40 INJECTION SUBCUTANEOUS at 14:00

## 2021-01-01 RX ADMIN — DOCUSATE SODIUM 50MG AND SENNOSIDES 8.6MG 2 TABLET: 8.6; 5 TABLET, FILM COATED ORAL at 18:35

## 2021-01-01 RX ADMIN — BUDESONIDE AND FORMOTEROL FUMARATE DIHYDRATE 2 PUFF: 160; 4.5 AEROSOL RESPIRATORY (INHALATION) at 21:07

## 2021-01-01 RX ADMIN — PANTOPRAZOLE SODIUM 40 MG: 40 TABLET, DELAYED RELEASE ORAL at 05:16

## 2021-01-01 RX ADMIN — MAGNESIUM SULFATE HEPTAHYDRATE 2 G: 40 INJECTION, SOLUTION INTRAVENOUS at 17:12

## 2021-01-01 RX ADMIN — Medication 10 ML: at 05:37

## 2021-01-01 RX ADMIN — ACETAMINOPHEN 650 MG: 325 TABLET, FILM COATED ORAL at 21:38

## 2021-01-01 RX ADMIN — BUDESONIDE AND FORMOTEROL FUMARATE DIHYDRATE 2 PUFF: 160; 4.5 AEROSOL RESPIRATORY (INHALATION) at 20:41

## 2021-01-01 RX ADMIN — BUDESONIDE AND FORMOTEROL FUMARATE DIHYDRATE 2 PUFF: 160; 4.5 AEROSOL RESPIRATORY (INHALATION) at 09:21

## 2021-01-01 RX ADMIN — SODIUM CHLORIDE 150 ML/HR: 900 INJECTION, SOLUTION INTRAVENOUS at 12:40

## 2021-01-01 RX ADMIN — DOCUSATE SODIUM 50MG AND SENNOSIDES 8.6MG 2 TABLET: 8.6; 5 TABLET, FILM COATED ORAL at 18:34

## 2021-01-01 RX ADMIN — AMLODIPINE BESYLATE 10 MG: 10 TABLET ORAL at 09:25

## 2021-01-01 RX ADMIN — POTASSIUM CHLORIDE 20 MEQ: 14.9 INJECTION, SOLUTION INTRAVENOUS at 10:29

## 2021-01-01 RX ADMIN — DOCUSATE SODIUM 50MG AND SENNOSIDES 8.6MG 2 TABLET: 8.6; 5 TABLET, FILM COATED ORAL at 08:23

## 2021-01-01 RX ADMIN — LOSARTAN POTASSIUM 50 MG: 50 TABLET, FILM COATED ORAL at 08:02

## 2021-01-01 RX ADMIN — Medication 400 MG: at 07:17

## 2021-01-01 RX ADMIN — FAMOTIDINE 40 MG: 20 TABLET, FILM COATED ORAL at 07:38

## 2021-01-01 RX ADMIN — SUCRALFATE 1 G: 1 TABLET ORAL at 08:26

## 2021-01-01 RX ADMIN — OXYCODONE 5 MG: 5 TABLET ORAL at 07:57

## 2021-01-01 RX ADMIN — ENOXAPARIN SODIUM 40 MG: 40 INJECTION SUBCUTANEOUS at 15:15

## 2021-01-01 RX ADMIN — AMLODIPINE BESYLATE 5 MG: 5 TABLET ORAL at 07:17

## 2021-01-01 RX ADMIN — PRAVASTATIN SODIUM 40 MG: 20 TABLET ORAL at 08:23

## 2021-01-01 RX ADMIN — CIPROFLOXACIN 500 MG: 500 TABLET, FILM COATED ORAL at 08:22

## 2021-01-01 RX ADMIN — NYSTATIN 500000 UNITS: 100000 SUSPENSION ORAL at 18:05

## 2021-01-01 RX ADMIN — LEVOTHYROXINE SODIUM 88 MCG: 0.09 TABLET ORAL at 07:38

## 2021-01-01 RX ADMIN — FLUTICASONE PROPIONATE 2 SPRAY: 50 SPRAY, METERED NASAL at 08:36

## 2021-01-01 RX ADMIN — PERFLUTREN 1 ML: 6.52 INJECTION, SUSPENSION INTRAVENOUS at 13:30

## 2021-01-01 RX ADMIN — PROPRANOLOL HYDROCHLORIDE 120 MG: 60 CAPSULE, EXTENDED RELEASE ORAL at 08:01

## 2021-01-01 RX ADMIN — FLUTICASONE PROPIONATE 2 SPRAY: 50 SPRAY, METERED NASAL at 07:42

## 2021-01-01 RX ADMIN — PANTOPRAZOLE SODIUM 40 MG: 40 TABLET, DELAYED RELEASE ORAL at 07:50

## 2021-01-01 RX ADMIN — Medication 20 ML: at 01:00

## 2021-01-01 RX ADMIN — LOSARTAN POTASSIUM 100 MG: 50 TABLET, FILM COATED ORAL at 08:23

## 2021-01-01 RX ADMIN — LACTULOSE 15 ML: 20 SOLUTION ORAL at 21:09

## 2021-01-01 RX ADMIN — BUDESONIDE AND FORMOTEROL FUMARATE DIHYDRATE 2 PUFF: 160; 4.5 AEROSOL RESPIRATORY (INHALATION) at 09:06

## 2021-01-01 RX ADMIN — MORPHINE SULFATE 2 MG: 2 INJECTION, SOLUTION INTRAMUSCULAR; INTRAVENOUS at 17:25

## 2021-01-01 RX ADMIN — DEXAMETHASONE 6 MG: 4 TABLET ORAL at 08:37

## 2021-01-01 RX ADMIN — ENOXAPARIN SODIUM 40 MG: 40 INJECTION SUBCUTANEOUS at 15:06

## 2021-01-01 RX ADMIN — PROPRANOLOL HYDROCHLORIDE 120 MG: 60 CAPSULE, EXTENDED RELEASE ORAL at 08:22

## 2021-01-01 RX ADMIN — LEVOTHYROXINE SODIUM 88 MCG: 0.09 TABLET ORAL at 08:37

## 2021-01-01 RX ADMIN — SUCRALFATE 1 G: 1 TABLET ORAL at 07:55

## 2021-01-01 RX ADMIN — LIDOCAINE HYDROCHLORIDE 3 ML: 20 INJECTION, SOLUTION INFILTRATION; PERINEURAL at 11:09

## 2021-01-01 RX ADMIN — LOSARTAN POTASSIUM 50 MG: 50 TABLET, FILM COATED ORAL at 08:54

## 2021-01-01 RX ADMIN — DOCUSATE SODIUM 50MG AND SENNOSIDES 8.6MG 2 TABLET: 8.6; 5 TABLET, FILM COATED ORAL at 07:40

## 2021-01-01 RX ADMIN — MORPHINE SULFATE 15 MG: 15 TABLET, FILM COATED, EXTENDED RELEASE ORAL at 05:14

## 2021-01-01 RX ADMIN — DEXAMETHASONE 4 MG: 4 TABLET ORAL at 15:01

## 2021-01-01 RX ADMIN — MORPHINE SULFATE 15 MG: 15 TABLET, FILM COATED, EXTENDED RELEASE ORAL at 04:52

## 2021-01-01 RX ADMIN — MORPHINE SULFATE 15 MG: 15 TABLET, FILM COATED, EXTENDED RELEASE ORAL at 05:23

## 2021-01-01 RX ADMIN — LOSARTAN POTASSIUM 100 MG: 50 TABLET, FILM COATED ORAL at 07:39

## 2021-01-01 RX ADMIN — DEXAMETHASONE 6 MG: 4 TABLET ORAL at 08:22

## 2021-01-01 RX ADMIN — PROPRANOLOL HYDROCHLORIDE 120 MG: 60 CAPSULE, EXTENDED RELEASE ORAL at 09:25

## 2021-01-01 RX ADMIN — HYDRALAZINE HYDROCHLORIDE 10 MG: 20 INJECTION INTRAMUSCULAR; INTRAVENOUS at 07:45

## 2021-01-01 RX ADMIN — PRAVASTATIN SODIUM 40 MG: 20 TABLET ORAL at 21:47

## 2021-01-01 RX ADMIN — FAMOTIDINE 40 MG: 20 TABLET, FILM COATED ORAL at 08:01

## 2021-01-01 RX ADMIN — GADOTERATE MEGLUMINE 12 ML: 376.9 INJECTION INTRAVENOUS at 09:03

## 2021-01-01 RX ADMIN — BUDESONIDE AND FORMOTEROL FUMARATE DIHYDRATE 2 PUFF: 160; 4.5 AEROSOL RESPIRATORY (INHALATION) at 09:40

## 2021-01-01 RX ADMIN — POTASSIUM CHLORIDE 20 MEQ: 20 TABLET, EXTENDED RELEASE ORAL at 07:40

## 2021-01-01 RX ADMIN — MAGNESIUM SULFATE HEPTAHYDRATE 2 G: 40 INJECTION, SOLUTION INTRAVENOUS at 08:06

## 2021-01-01 RX ADMIN — FLUTICASONE PROPIONATE 2 SPRAY: 50 SPRAY, METERED NASAL at 08:23

## 2021-01-01 RX ADMIN — Medication 10 ML: at 17:19

## 2021-01-01 RX ADMIN — HYDROCHLOROTHIAZIDE 25 MG: 25 TABLET ORAL at 08:22

## 2021-01-01 RX ADMIN — PANTOPRAZOLE SODIUM 40 MG: 40 TABLET, DELAYED RELEASE ORAL at 17:50

## 2021-01-01 RX ADMIN — MORPHINE SULFATE 15 MG: 15 TABLET, FILM COATED, EXTENDED RELEASE ORAL at 16:12

## 2021-01-01 RX ADMIN — FENTANYL CITRATE 50 MCG: 50 INJECTION, SOLUTION INTRAMUSCULAR; INTRAVENOUS at 08:44

## 2021-01-01 RX ADMIN — MORPHINE SULFATE 2 MG: 2 INJECTION, SOLUTION INTRAMUSCULAR; INTRAVENOUS at 05:14

## 2021-01-01 RX ADMIN — POTASSIUM CHLORIDE 20 MEQ: 20 TABLET, EXTENDED RELEASE ORAL at 08:54

## 2021-01-01 RX ADMIN — SUCRALFATE 1 G: 1 TABLET ORAL at 21:07

## 2021-01-01 RX ADMIN — BUDESONIDE AND FORMOTEROL FUMARATE DIHYDRATE 2 PUFF: 160; 4.5 AEROSOL RESPIRATORY (INHALATION) at 17:38

## 2021-01-01 RX ADMIN — POTASSIUM CHLORIDE 20 MEQ: 20 TABLET, EXTENDED RELEASE ORAL at 08:01

## 2021-01-01 RX ADMIN — LEVOTHYROXINE SODIUM 88 MCG: 0.09 TABLET ORAL at 07:57

## 2021-01-01 RX ADMIN — PRAVASTATIN SODIUM 40 MG: 20 TABLET ORAL at 23:19

## 2021-01-01 RX ADMIN — Medication 10 ML: at 14:11

## 2021-01-01 RX ADMIN — PRAVASTATIN SODIUM 40 MG: 20 TABLET ORAL at 20:52

## 2021-01-01 RX ADMIN — ENOXAPARIN SODIUM 40 MG: 40 INJECTION SUBCUTANEOUS at 08:23

## 2021-01-01 RX ADMIN — HYDRALAZINE HYDROCHLORIDE 10 MG: 20 INJECTION INTRAMUSCULAR; INTRAVENOUS at 17:21

## 2021-01-01 RX ADMIN — DOCUSATE SODIUM 50MG AND SENNOSIDES 8.6MG 2 TABLET: 8.6; 5 TABLET, FILM COATED ORAL at 16:59

## 2021-01-01 RX ADMIN — FLUOXETINE 20 MG: 20 CAPSULE ORAL at 20:24

## 2021-01-01 RX ADMIN — IOPAMIDOL 100 ML: 755 INJECTION, SOLUTION INTRAVENOUS at 17:19

## 2021-01-01 RX ADMIN — OXYCODONE 5 MG: 5 TABLET ORAL at 17:21

## 2021-01-01 RX ADMIN — PRAVASTATIN SODIUM 40 MG: 20 TABLET ORAL at 22:38

## 2021-01-01 RX ADMIN — METHOTREXATE 12 MG: 25 INJECTION, SOLUTION INTRA-ARTERIAL; INTRAMUSCULAR; INTRATHECAL; INTRAVENOUS at 10:48

## 2021-01-01 RX ADMIN — AMLODIPINE BESYLATE 10 MG: 10 TABLET ORAL at 08:37

## 2021-01-01 RX ADMIN — Medication 10 ML: at 15:36

## 2021-01-01 RX ADMIN — ONDANSETRON 4 MG: 2 INJECTION INTRAMUSCULAR; INTRAVENOUS at 17:26

## 2021-01-01 RX ADMIN — FAMOTIDINE 40 MG: 20 TABLET, FILM COATED ORAL at 09:24

## 2021-01-01 RX ADMIN — ENOXAPARIN SODIUM 40 MG: 40 INJECTION SUBCUTANEOUS at 16:20

## 2021-01-01 RX ADMIN — LEVOTHYROXINE SODIUM 88 MCG: 0.09 TABLET ORAL at 05:23

## 2021-01-01 RX ADMIN — ENOXAPARIN SODIUM 40 MG: 40 INJECTION SUBCUTANEOUS at 15:33

## 2021-01-01 RX ADMIN — ENOXAPARIN SODIUM 40 MG: 40 INJECTION SUBCUTANEOUS at 07:56

## 2021-01-01 RX ADMIN — SODIUM CHLORIDE 75 ML/HR: 900 INJECTION, SOLUTION INTRAVENOUS at 03:35

## 2021-01-01 RX ADMIN — TRAMADOL HYDROCHLORIDE 50 MG: 50 TABLET, FILM COATED ORAL at 09:24

## 2021-01-01 RX ADMIN — POTASSIUM CHLORIDE 20 MEQ: 20 TABLET, EXTENDED RELEASE ORAL at 18:35

## 2021-01-01 RX ADMIN — SODIUM CHLORIDE 150 ML/HR: 900 INJECTION, SOLUTION INTRAVENOUS at 21:37

## 2021-01-01 RX ADMIN — PROPRANOLOL HYDROCHLORIDE 120 MG: 60 CAPSULE, EXTENDED RELEASE ORAL at 08:23

## 2021-01-01 RX ADMIN — TRAMADOL HYDROCHLORIDE 50 MG: 50 TABLET, FILM COATED ORAL at 21:32

## 2021-01-01 RX ADMIN — LOSARTAN POTASSIUM 50 MG: 50 TABLET, FILM COATED ORAL at 10:40

## 2021-01-01 RX ADMIN — FAMOTIDINE 40 MG: 20 TABLET, FILM COATED ORAL at 08:37

## 2021-01-01 RX ADMIN — MIDAZOLAM 1 MG: 1 INJECTION INTRAMUSCULAR; INTRAVENOUS at 08:44

## 2021-01-01 RX ADMIN — BUDESONIDE AND FORMOTEROL FUMARATE DIHYDRATE 2 PUFF: 160; 4.5 AEROSOL RESPIRATORY (INHALATION) at 08:25

## 2021-01-01 RX ADMIN — POTASSIUM CHLORIDE 20 MEQ: 14.9 INJECTION, SOLUTION INTRAVENOUS at 08:06

## 2021-01-01 RX ADMIN — CIPROFLOXACIN 500 MG: 500 TABLET, FILM COATED ORAL at 08:23

## 2021-01-01 RX ADMIN — FLUOXETINE 60 MG: 20 CAPSULE ORAL at 20:53

## 2021-01-01 RX ADMIN — POTASSIUM CHLORIDE 20 MEQ: 14.9 INJECTION, SOLUTION INTRAVENOUS at 23:00

## 2021-01-01 RX ADMIN — SUCRALFATE 1 G: 1 TABLET ORAL at 12:35

## 2021-01-01 RX ADMIN — FLUOXETINE 60 MG: 20 CAPSULE ORAL at 21:29

## 2021-01-01 RX ADMIN — FLUTICASONE PROPIONATE 2 SPRAY: 50 SPRAY, METERED NASAL at 09:06

## 2021-01-01 RX ADMIN — NYSTATIN 500000 UNITS: 100000 SUSPENSION ORAL at 07:41

## 2021-01-01 RX ADMIN — CIPROFLOXACIN 400 MG: 2 INJECTION, SOLUTION INTRAVENOUS at 20:42

## 2021-01-01 RX ADMIN — SODIUM CHLORIDE 150 ML/HR: 900 INJECTION, SOLUTION INTRAVENOUS at 12:02

## 2021-01-01 RX ADMIN — TRAMADOL HYDROCHLORIDE 50 MG: 50 TABLET, FILM COATED ORAL at 09:15

## 2021-01-01 RX ADMIN — OXYCODONE 5 MG: 5 TABLET ORAL at 22:44

## 2021-01-01 RX ADMIN — SUCRALFATE 1 G: 1 TABLET ORAL at 17:50

## 2021-01-01 RX ADMIN — NYSTATIN 500000 UNITS: 100000 SUSPENSION ORAL at 08:36

## 2021-01-01 RX ADMIN — LEVOTHYROXINE SODIUM 88 MCG: 0.09 TABLET ORAL at 08:23

## 2021-01-01 RX ADMIN — DOCUSATE SODIUM 50MG AND SENNOSIDES 8.6MG 2 TABLET: 8.6; 5 TABLET, FILM COATED ORAL at 17:37

## 2021-01-01 RX ADMIN — CIPROFLOXACIN 400 MG: 2 INJECTION, SOLUTION INTRAVENOUS at 08:35

## 2021-01-01 RX ADMIN — METRONIDAZOLE 500 MG: 500 INJECTION, SOLUTION INTRAVENOUS at 20:39

## 2021-01-01 RX ADMIN — FAMOTIDINE 40 MG: 20 TABLET, FILM COATED ORAL at 08:02

## 2021-01-01 RX ADMIN — ONDANSETRON 8 MG: 2 INJECTION INTRAMUSCULAR; INTRAVENOUS at 17:20

## 2021-01-01 RX ADMIN — Medication 400 MG: at 16:20

## 2021-01-01 RX ADMIN — PROPRANOLOL HYDROCHLORIDE 120 MG: 60 CAPSULE, EXTENDED RELEASE ORAL at 08:33

## 2021-01-01 RX ADMIN — PROPRANOLOL HYDROCHLORIDE 120 MG: 60 CAPSULE, EXTENDED RELEASE ORAL at 08:26

## 2021-01-01 RX ADMIN — SUCRALFATE 1 G: 1 TABLET ORAL at 08:23

## 2021-01-01 RX ADMIN — DEXAMETHASONE 6 MG: 4 TABLET ORAL at 22:24

## 2021-01-01 RX ADMIN — TRAMADOL HYDROCHLORIDE 50 MG: 50 TABLET, FILM COATED ORAL at 13:29

## 2021-01-01 RX ADMIN — NYSTATIN 500000 UNITS: 100000 SUSPENSION ORAL at 13:57

## 2021-01-01 RX ADMIN — SUCRALFATE 1 G: 1 TABLET ORAL at 17:40

## 2021-01-01 RX ADMIN — MORPHINE SULFATE 2 MG: 2 INJECTION, SOLUTION INTRAMUSCULAR; INTRAVENOUS at 03:19

## 2021-01-01 RX ADMIN — GADOTERATE MEGLUMINE 11 ML: 376.9 INJECTION INTRAVENOUS at 16:13

## 2021-01-01 RX ADMIN — FLUOXETINE 20 MG: 20 CAPSULE ORAL at 21:07

## 2021-01-01 RX ADMIN — ENOXAPARIN SODIUM 40 MG: 40 INJECTION SUBCUTANEOUS at 15:11

## 2021-01-01 RX ADMIN — HYDROCHLOROTHIAZIDE 25 MG: 25 TABLET ORAL at 12:28

## 2021-01-01 RX ADMIN — NYSTATIN 500000 UNITS: 100000 SUSPENSION ORAL at 09:24

## 2021-01-01 RX ADMIN — Medication 400 MG: at 08:58

## 2021-01-01 RX ADMIN — Medication 10 ML: at 09:02

## 2021-01-01 RX ADMIN — SODIUM CHLORIDE 75 ML/HR: 900 INJECTION, SOLUTION INTRAVENOUS at 05:40

## 2021-01-01 RX ADMIN — POTASSIUM CHLORIDE 20 MEQ: 20 TABLET, EXTENDED RELEASE ORAL at 08:33

## 2021-01-01 RX ADMIN — FAMOTIDINE 40 MG: 20 TABLET, FILM COATED ORAL at 08:36

## 2021-01-01 RX ADMIN — PROPRANOLOL HYDROCHLORIDE 120 MG: 60 CAPSULE, EXTENDED RELEASE ORAL at 08:36

## 2021-01-01 RX ADMIN — SUCRALFATE 1 G: 1 TABLET ORAL at 08:35

## 2021-01-01 RX ADMIN — POTASSIUM CHLORIDE 20 MEQ: 20 TABLET, EXTENDED RELEASE ORAL at 17:02

## 2021-01-01 RX ADMIN — FLUOXETINE 60 MG: 20 CAPSULE ORAL at 23:19

## 2021-01-01 RX ADMIN — FENTANYL CITRATE 25 MCG: 50 INJECTION, SOLUTION INTRAMUSCULAR; INTRAVENOUS at 08:49

## 2021-01-01 RX ADMIN — ACETAMINOPHEN 650 MG: 325 TABLET, FILM COATED ORAL at 03:29

## 2021-01-01 RX ADMIN — Medication 10 ML: at 21:11

## 2021-01-01 RX ADMIN — SUCRALFATE 1 G: 1 TABLET ORAL at 12:28

## 2021-01-01 RX ADMIN — NYSTATIN 500000 UNITS: 100000 SUSPENSION ORAL at 18:33

## 2021-01-01 RX ADMIN — NYSTATIN 500000 UNITS: 100000 SUSPENSION ORAL at 13:29

## 2021-01-01 RX ADMIN — BUDESONIDE AND FORMOTEROL FUMARATE DIHYDRATE 2 PUFF: 160; 4.5 AEROSOL RESPIRATORY (INHALATION) at 20:53

## 2021-01-01 RX ADMIN — OXYCODONE 5 MG: 5 TABLET ORAL at 15:06

## 2021-01-01 RX ADMIN — CIPROFLOXACIN 500 MG: 500 TABLET, FILM COATED ORAL at 21:38

## 2021-01-01 RX ADMIN — NYSTATIN 500000 UNITS: 100000 SUSPENSION ORAL at 22:38

## 2021-01-01 RX ADMIN — PRAVASTATIN SODIUM 40 MG: 20 TABLET ORAL at 20:29

## 2021-01-01 RX ADMIN — ALUMINUM HYDROXIDE, MAGNESIUM HYDROXIDE, DIMETHICONE 30 ML: 200; 200; 20 LIQUID ORAL at 00:35

## 2021-01-01 RX ADMIN — POTASSIUM CHLORIDE 20 MEQ: 14.9 INJECTION, SOLUTION INTRAVENOUS at 10:03

## 2021-01-01 RX ADMIN — PRAVASTATIN SODIUM 40 MG: 20 TABLET ORAL at 22:25

## 2021-01-01 RX ADMIN — ONDANSETRON 8 MG: 2 INJECTION INTRAMUSCULAR; INTRAVENOUS at 12:26

## 2021-01-01 RX ADMIN — Medication 10 ML: at 04:52

## 2021-01-01 RX ADMIN — MORPHINE SULFATE 2 MG: 2 INJECTION, SOLUTION INTRAMUSCULAR; INTRAVENOUS at 20:41

## 2021-01-01 RX ADMIN — Medication 10 ML: at 05:24

## 2021-01-01 RX ADMIN — HYDROCHLOROTHIAZIDE 25 MG: 25 TABLET ORAL at 08:25

## 2021-01-01 RX ADMIN — ALBUTEROL SULFATE 2.5 MG: 2.5 SOLUTION RESPIRATORY (INHALATION) at 17:30

## 2021-01-01 RX ADMIN — LIDOCAINE HYDROCHLORIDE 150 MG: 20 INJECTION, SOLUTION INFILTRATION; PERINEURAL at 08:49

## 2021-01-01 RX ADMIN — DIAZEPAM 5 MG: 5 TABLET ORAL at 05:14

## 2021-01-01 RX ADMIN — LEVOTHYROXINE SODIUM 88 MCG: 0.09 TABLET ORAL at 08:58

## 2021-01-01 RX ADMIN — PRAVASTATIN SODIUM 40 MG: 20 TABLET ORAL at 20:26

## 2021-01-01 RX ADMIN — PRAVASTATIN SODIUM 40 MG: 20 TABLET ORAL at 12:28

## 2021-01-01 RX ADMIN — FAMOTIDINE 40 MG: 20 TABLET, FILM COATED ORAL at 08:23

## 2021-01-01 RX ADMIN — SUCRALFATE 1 G: 1 TABLET ORAL at 21:38

## 2021-01-01 RX ADMIN — CIPROFLOXACIN 500 MG: 500 TABLET, FILM COATED ORAL at 21:07

## 2021-01-01 RX ADMIN — Medication 400 MG: at 21:47

## 2021-01-01 RX ADMIN — SODIUM CHLORIDE 150 ML/HR: 900 INJECTION, SOLUTION INTRAVENOUS at 07:00

## 2021-01-01 RX ADMIN — MORPHINE SULFATE 15 MG: 15 TABLET, FILM COATED, EXTENDED RELEASE ORAL at 17:12

## 2021-01-01 RX ADMIN — BUDESONIDE AND FORMOTEROL FUMARATE DIHYDRATE 2 PUFF: 160; 4.5 AEROSOL RESPIRATORY (INHALATION) at 07:52

## 2021-01-01 RX ADMIN — PRAVASTATIN SODIUM 40 MG: 20 TABLET ORAL at 08:26

## 2021-01-01 RX ADMIN — OXYCODONE 5 MG: 5 TABLET ORAL at 23:58

## 2021-01-01 RX ADMIN — POTASSIUM CHLORIDE 20 MEQ: 20 TABLET, EXTENDED RELEASE ORAL at 08:36

## 2021-01-01 RX ADMIN — DEXAMETHASONE 4 MG: 4 TABLET ORAL at 21:47

## 2021-01-01 RX ADMIN — LOSARTAN POTASSIUM 50 MG: 50 TABLET, FILM COATED ORAL at 08:36

## 2021-01-01 RX ADMIN — SODIUM CHLORIDE 75 ML/HR: 900 INJECTION, SOLUTION INTRAVENOUS at 16:33

## 2021-01-01 RX ADMIN — ONDANSETRON 8 MG: 2 INJECTION INTRAMUSCULAR; INTRAVENOUS at 15:06

## 2021-01-01 RX ADMIN — FLUTICASONE PROPIONATE 2 SPRAY: 50 SPRAY, METERED NASAL at 07:55

## 2021-01-01 RX ADMIN — FLUOXETINE 20 MG: 20 CAPSULE ORAL at 21:38

## 2021-01-01 RX ADMIN — LEVOTHYROXINE SODIUM 88 MCG: 0.09 TABLET ORAL at 08:01

## 2021-01-01 RX ADMIN — Medication 400 MG: at 22:38

## 2021-01-01 RX ADMIN — FLUOXETINE 60 MG: 20 CAPSULE ORAL at 20:29

## 2021-01-01 RX ADMIN — SODIUM CHLORIDE 100 ML: 900 INJECTION, SOLUTION INTRAVENOUS at 17:19

## 2021-01-01 RX ADMIN — NYSTATIN 500000 UNITS: 100000 SUSPENSION ORAL at 12:35

## 2021-01-01 RX ADMIN — ONDANSETRON 8 MG: 2 INJECTION INTRAMUSCULAR; INTRAVENOUS at 07:57

## 2021-01-01 RX ADMIN — PROPRANOLOL HYDROCHLORIDE 120 MG: 60 CAPSULE, EXTENDED RELEASE ORAL at 08:00

## 2021-01-01 RX ADMIN — Medication 400 MG: at 15:01

## 2021-01-01 RX ADMIN — NYSTATIN 500000 UNITS: 100000 SUSPENSION ORAL at 18:35

## 2021-01-01 RX ADMIN — FAMOTIDINE 40 MG: 20 TABLET, FILM COATED ORAL at 08:54

## 2021-01-01 RX ADMIN — FLUOXETINE 60 MG: 20 CAPSULE ORAL at 21:06

## 2021-01-01 RX ADMIN — LOSARTAN POTASSIUM 100 MG: 50 TABLET, FILM COATED ORAL at 08:01

## 2021-01-01 RX ADMIN — AMLODIPINE BESYLATE 10 MG: 10 TABLET ORAL at 08:23

## 2021-01-01 RX ADMIN — FAMOTIDINE 40 MG: 20 TABLET, FILM COATED ORAL at 08:58

## 2021-01-01 RX ADMIN — MORPHINE SULFATE 2 MG: 2 INJECTION, SOLUTION INTRAMUSCULAR; INTRAVENOUS at 21:19

## 2021-01-01 RX ADMIN — SUCRALFATE 1 G: 1 TABLET ORAL at 21:11

## 2021-01-01 RX ADMIN — Medication 400 MG: at 08:37

## 2021-01-01 RX ADMIN — HYDRALAZINE HYDROCHLORIDE 10 MG: 20 INJECTION INTRAMUSCULAR; INTRAVENOUS at 03:17

## 2021-01-01 RX ADMIN — DOCUSATE SODIUM 50MG AND SENNOSIDES 8.6MG 2 TABLET: 8.6; 5 TABLET, FILM COATED ORAL at 16:20

## 2021-01-01 RX ADMIN — Medication 400 MG: at 21:30

## 2021-01-01 RX ADMIN — SODIUM CHLORIDE 150 ML/HR: 900 INJECTION, SOLUTION INTRAVENOUS at 11:55

## 2021-01-01 RX ADMIN — MORPHINE SULFATE 15 MG: 15 TABLET, FILM COATED, EXTENDED RELEASE ORAL at 05:16

## 2021-01-01 RX ADMIN — DOCUSATE SODIUM 50MG AND SENNOSIDES 8.6MG 2 TABLET: 8.6; 5 TABLET, FILM COATED ORAL at 09:24

## 2021-01-01 RX ADMIN — NYSTATIN 500000 UNITS: 100000 SUSPENSION ORAL at 08:22

## 2021-01-01 RX ADMIN — Medication 400 MG: at 16:59

## 2021-01-01 RX ADMIN — LIDOCAINE HYDROCHLORIDE 4 MG: 20 INJECTION, SOLUTION INFILTRATION; PERINEURAL at 14:39

## 2021-01-01 RX ADMIN — LEVOTHYROXINE SODIUM 88 MCG: 0.09 TABLET ORAL at 08:35

## 2021-01-01 RX ADMIN — ENOXAPARIN SODIUM 40 MG: 40 INJECTION SUBCUTANEOUS at 12:27

## 2021-01-01 RX ADMIN — POTASSIUM CHLORIDE 20 MEQ: 20 TABLET, EXTENDED RELEASE ORAL at 09:25

## 2021-01-01 RX ADMIN — Medication 400 MG: at 20:51

## 2021-01-01 RX ADMIN — DOCUSATE SODIUM 50MG AND SENNOSIDES 8.6MG 1 TABLET: 8.6; 5 TABLET, FILM COATED ORAL at 08:01

## 2021-01-01 RX ADMIN — BUDESONIDE AND FORMOTEROL FUMARATE DIHYDRATE 2 PUFF: 160; 4.5 AEROSOL RESPIRATORY (INHALATION) at 20:10

## 2021-01-01 RX ADMIN — Medication 400 MG: at 09:25

## 2021-01-01 RX ADMIN — BUDESONIDE AND FORMOTEROL FUMARATE DIHYDRATE 2 PUFF: 160; 4.5 AEROSOL RESPIRATORY (INHALATION) at 08:59

## 2021-01-01 RX ADMIN — OXYCODONE 5 MG: 5 TABLET ORAL at 12:01

## 2021-01-01 RX ADMIN — AMLODIPINE BESYLATE 10 MG: 10 TABLET ORAL at 08:58

## 2021-01-01 RX ADMIN — SUCRALFATE 1 G: 1 TABLET ORAL at 14:14

## 2021-01-01 RX ADMIN — LEVOTHYROXINE SODIUM 88 MCG: 0.09 TABLET ORAL at 05:37

## 2021-01-01 RX ADMIN — OXYCODONE 5 MG: 5 TABLET ORAL at 08:26

## 2021-01-01 RX ADMIN — FLUOXETINE 60 MG: 20 CAPSULE ORAL at 22:24

## 2021-01-01 RX ADMIN — POTASSIUM CHLORIDE 20 MEQ: 20 TABLET, EXTENDED RELEASE ORAL at 07:17

## 2021-01-01 RX ADMIN — POTASSIUM CHLORIDE 20 MEQ: 20 TABLET, EXTENDED RELEASE ORAL at 08:58

## 2021-01-01 RX ADMIN — HYDROCHLOROTHIAZIDE 25 MG: 25 TABLET ORAL at 08:35

## 2021-01-01 RX ADMIN — LEVOTHYROXINE SODIUM 88 MCG: 0.09 TABLET ORAL at 07:33

## 2021-01-01 RX ADMIN — LEVOTHYROXINE SODIUM 88 MCG: 0.09 TABLET ORAL at 05:16

## 2021-01-01 RX ADMIN — Medication 400 MG: at 17:38

## 2021-01-01 RX ADMIN — DEXAMETHASONE 6 MG: 4 TABLET ORAL at 20:51

## 2021-01-01 RX ADMIN — POTASSIUM CHLORIDE 40 MEQ: 1500 TABLET, EXTENDED RELEASE ORAL at 08:25

## 2021-01-01 RX ADMIN — CIPROFLOXACIN 400 MG: 2 INJECTION, SOLUTION INTRAVENOUS at 20:24

## 2021-01-01 RX ADMIN — DEXAMETHASONE 6 MG: 4 TABLET ORAL at 21:33

## 2021-01-01 RX ADMIN — LORAZEPAM 1 MG: 2 INJECTION INTRAMUSCULAR; INTRAVENOUS at 15:01

## 2021-01-01 RX ADMIN — OXYCODONE 5 MG: 5 TABLET ORAL at 12:26

## 2021-01-01 RX ADMIN — DIAZEPAM 5 MG: 5 TABLET ORAL at 21:38

## 2021-01-01 RX ADMIN — SUCRALFATE 1 G: 1 TABLET ORAL at 12:04

## 2021-01-01 RX ADMIN — LOSARTAN POTASSIUM 100 MG: 50 TABLET, FILM COATED ORAL at 08:58

## 2021-01-01 RX ADMIN — LEVOTHYROXINE SODIUM 88 MCG: 0.09 TABLET ORAL at 04:52

## 2021-01-01 RX ADMIN — FLUOXETINE 60 MG: 20 CAPSULE ORAL at 22:38

## 2021-01-01 RX ADMIN — ENOXAPARIN SODIUM 40 MG: 40 INJECTION SUBCUTANEOUS at 15:44

## 2021-01-01 RX ADMIN — Medication 400 MG: at 15:44

## 2021-01-01 RX ADMIN — SUCRALFATE 1 G: 1 TABLET ORAL at 12:09

## 2021-01-01 RX ADMIN — PANTOPRAZOLE SODIUM 40 MG: 40 TABLET, DELAYED RELEASE ORAL at 16:26

## 2021-01-01 RX ADMIN — ENOXAPARIN SODIUM 40 MG: 40 INJECTION SUBCUTANEOUS at 17:20

## 2021-01-01 RX ADMIN — PROPRANOLOL HYDROCHLORIDE 120 MG: 60 CAPSULE, EXTENDED RELEASE ORAL at 08:35

## 2021-01-01 RX ADMIN — POTASSIUM CHLORIDE 20 MEQ: 20 TABLET, EXTENDED RELEASE ORAL at 16:59

## 2021-01-01 RX ADMIN — POTASSIUM CHLORIDE 20 MEQ: 20 TABLET, EXTENDED RELEASE ORAL at 18:33

## 2021-01-01 RX ADMIN — POTASSIUM CHLORIDE 20 MEQ: 20 TABLET, EXTENDED RELEASE ORAL at 09:00

## 2021-01-01 RX ADMIN — Medication 400 MG: at 22:25

## 2021-01-01 RX ADMIN — Medication 10 ML: at 15:40

## 2021-01-01 RX ADMIN — POTASSIUM CHLORIDE 20 MEQ: 20 TABLET, EXTENDED RELEASE ORAL at 18:05

## 2021-01-01 RX ADMIN — LEVOTHYROXINE SODIUM 88 MCG: 0.09 TABLET ORAL at 07:17

## 2021-01-01 RX ADMIN — BUDESONIDE AND FORMOTEROL FUMARATE DIHYDRATE 2 PUFF: 160; 4.5 AEROSOL RESPIRATORY (INHALATION) at 20:12

## 2021-01-01 RX ADMIN — DOCUSATE SODIUM 50MG AND SENNOSIDES 8.6MG 2 TABLET: 8.6; 5 TABLET, FILM COATED ORAL at 08:36

## 2021-01-01 RX ADMIN — SODIUM CHLORIDE 75 ML/HR: 900 INJECTION, SOLUTION INTRAVENOUS at 08:27

## 2021-01-01 RX ADMIN — PROPRANOLOL HYDROCHLORIDE 120 MG: 60 CAPSULE, EXTENDED RELEASE ORAL at 08:54

## 2021-01-01 RX ADMIN — MIDAZOLAM 0.5 MG: 1 INJECTION INTRAMUSCULAR; INTRAVENOUS at 08:49

## 2021-01-01 RX ADMIN — ENOXAPARIN SODIUM 40 MG: 40 INJECTION SUBCUTANEOUS at 08:25

## 2021-01-01 RX ADMIN — Medication 400 MG: at 15:33

## 2021-01-01 RX ADMIN — POTASSIUM CHLORIDE 20 MEQ: 14.9 INJECTION, SOLUTION INTRAVENOUS at 03:02

## 2021-01-01 RX ADMIN — POTASSIUM CHLORIDE 20 MEQ: 20 TABLET, EXTENDED RELEASE ORAL at 18:00

## 2021-01-01 RX ADMIN — FAMOTIDINE 40 MG: 20 TABLET, FILM COATED ORAL at 08:33

## 2021-01-01 RX ADMIN — Medication 400 MG: at 07:41

## 2021-01-01 RX ADMIN — LOSARTAN POTASSIUM 100 MG: 50 TABLET, FILM COATED ORAL at 08:37

## 2021-01-01 RX ADMIN — Medication 10 ML: at 11:32

## 2021-01-01 RX ADMIN — POTASSIUM CHLORIDE 20 MEQ: 20 TABLET, EXTENDED RELEASE ORAL at 17:38

## 2021-01-01 RX ADMIN — LOSARTAN POTASSIUM 100 MG: 50 TABLET, FILM COATED ORAL at 09:24

## 2021-01-01 RX ADMIN — PRAVASTATIN SODIUM 40 MG: 20 TABLET ORAL at 22:01

## 2021-01-01 RX ADMIN — ENOXAPARIN SODIUM 40 MG: 40 INJECTION SUBCUTANEOUS at 08:34

## 2021-01-01 RX ADMIN — GADOTERATE MEGLUMINE 11 ML: 376.9 INJECTION INTRAVENOUS at 11:32

## 2021-01-01 RX ADMIN — POTASSIUM CHLORIDE 20 MEQ: 20 TABLET, EXTENDED RELEASE ORAL at 17:20

## 2021-01-01 RX ADMIN — PROPRANOLOL HYDROCHLORIDE 120 MG: 60 CAPSULE, EXTENDED RELEASE ORAL at 10:28

## 2021-01-01 RX ADMIN — CIPROFLOXACIN 500 MG: 500 TABLET, FILM COATED ORAL at 15:34

## 2021-01-01 RX ADMIN — DOCUSATE SODIUM 50MG AND SENNOSIDES 8.6MG 2 TABLET: 8.6; 5 TABLET, FILM COATED ORAL at 18:05

## 2021-01-01 RX ADMIN — SODIUM CHLORIDE 1000 ML: 900 INJECTION, SOLUTION INTRAVENOUS at 20:39

## 2021-01-01 RX ADMIN — NYSTATIN 500000 UNITS: 100000 SUSPENSION ORAL at 20:53

## 2021-01-01 RX ADMIN — SODIUM CHLORIDE 75 ML/HR: 900 INJECTION, SOLUTION INTRAVENOUS at 13:03

## 2021-01-25 NOTE — PROGRESS NOTES
Nutrition Counseling:  Pt referred by Dr. Zayra Ambrocio. Pt did not show for initial appt today.     Colby Swann Claude 86, [address], [address] [facility], [address] [facility]

## 2021-01-30 PROBLEM — D72.829 LEUKOCYTOSIS: Status: ACTIVE | Noted: 2021-01-01

## 2021-01-30 PROBLEM — N39.0 UTI (URINARY TRACT INFECTION): Status: ACTIVE | Noted: 2021-01-01

## 2021-01-30 PROBLEM — M25.561 ACUTE PAIN OF RIGHT KNEE: Status: RESOLVED | Noted: 2017-05-25 | Resolved: 2021-01-01

## 2021-01-30 PROBLEM — K76.9 HEPATIC LESION: Status: ACTIVE | Noted: 2021-01-01

## 2021-01-30 PROBLEM — K86.9 PANCREATIC LESION: Status: ACTIVE | Noted: 2021-01-01

## 2021-01-30 PROBLEM — R79.89 ELEVATED LFTS: Status: ACTIVE | Noted: 2021-01-01

## 2021-01-31 PROBLEM — R10.9 ABDOMINAL PAIN: Status: ACTIVE | Noted: 2021-01-01

## 2021-01-31 NOTE — PROGRESS NOTES
01/31/21 0041 Dual Skin Pressure Injury Assessment Second Care Provider (Based on [address] [place] [address]) [place], RN No pressure ulcers noted.

## 2021-01-31 NOTE — MANAGEMENT PLAN
Inpatient Hematology / Oncology Management Plan Reason for Consult:  Hepatic lesion [K76.9] Referring Physician:  Temple Scheuermann, MD 
 
History of Present Illness: Ms. Kathleen Hernandez is a 76 y.o. female admitted on 1/30/2021 with a primary diagnosis of The primary encounter diagnosis was Urinary tract infection without hematuria, site unspecified. Diagnoses of Abdominal pain, generalized, Elevated liver enzymes, and Elevated lactic acid level were also pertinent to this visit. Ruba Hernandez presented to the ED due to worsening abdominal pain, nausea, and vomiting. She began having abdominal pain, decreased appetite and weight loss around Thanksgiving 2020. She reports ~20# weight loss. She has undergone workup at [facility] with abdominal US on 1/28 that revealed 1.8 cm lesion in pancreatic body and 8mm liver lesion of right lobe. It was recommended that Abd MRI be completed which revealed numerous liver lesions, pancreatic lesion, along with bone lesions in spine and pelvis. We were consulted for concern of new metastatic malignancy. Review of Systems: 
Constitutional +fatigue, appetite change and weight loss Denies fever, chills,  night sweats. HEENT Denies trauma, blurry vision, hearing loss, ear pain, nosebleeds, sore throat, neck pain and ear discharge. Skin Denies lesions or rashes. Lungs +dyspnea Denies cough, sputum production or hemoptysis. Cardiovascular Denies chest pain, palpitations, or lower extremity edema. Gastrointestinal Denies nausea, vomiting, changes in bowel habits, bloody or black stools, abdominal pain.  Denies dysuria, frequency or hesitancy of urination. Neuro Denies headaches, visual changes or ataxia. Denies dizziness, tingling, tremors, sensory change, speech change, focal weakness or headaches. Hematology Denies easy bruising or bleeding, denies gingival bleeding or epistaxis. Endo Denies heat/cold intolerance, denies diabetes or thyroid abnormalities. MSK Denies back pain, arthralgias, myalgias or frequent falls. Psychiatric/Behavioral The patient is not nervous/anxious. Allergies Allergen Reactions  Sulfa (Sulfonamide Antibiotics) Anaphylaxis  Buspar [Buspirone] Vertigo  Pcn [Penicillins] Hives  Wellbutrin [Bupropion Hcl] Other (comments) Constipation Past Medical History:  
Diagnosis Date  Anxiety state, unspecified  Dyslipidemia   
 HTN (hypertension)  Major depressive disorder, recurrent episode, in partial or unspecified remission  Tobacco abuse  Vertigo Past Surgical History:  
Procedure Laterality Date [facility]  HX COLONOSCOPY  2006, 2011 [address] [facility] ABDOMINAL HYSTERECTOMY  2000 [address] Family History Problem Relation Age of Onset  Coronary Artery Disease Father  Elevated Lipids Father  Hypertension Father  Heart Attack Father  Heart Disease Father  Bipolar Disorder Mother  Cancer Mother Leukemia/gastric  Alzheimer Maternal Grandmother  Heart Disease Maternal Grandfather  Cancer Maternal Grandfather  Breast Cancer Neg Hx Social History Socioeconomic History  Marital status:  Spouse name: Not on file  Number of children: Not on file  Years of education: Not on file  Highest education level: Not on file Occupational History  Not on file Social Needs  Financial resource strain: Not on file  Food insecurity Worry: Not on file Inability: Not on file  Transportation needs Medical: Not on file Non-medical: Not on file Tobacco Use  Smoking status: Current Every Day Smoker Packs/day: 1.00 Years: 48.00 Pack years: 48.00  Smokeless tobacco: Never Used Substance and Sexual Activity  Alcohol use: Yes Comment: rare  Drug use: No  
 Sexual activity: Yes  
  Partners: Male Lifestyle  Physical activity Days per week: Not on file Minutes per session: Not on file  Stress: Not on file Relationships  Social connections Talks on phone: Not on file Gets together: Not on file Attends [employer] service: Not on file Active member of club or organization: Not on file Attends meetings of clubs or organizations: Not on file Relationship status: Not on file  Intimate partner violence Fear of current or ex partner: Not on file Emotionally abused: Not on file Physically abused: Not on file Forced sexual activity: Not on file Other Topics Concern  Not on file Social History Narrative  Not on file Current Facility-Administered Medications Medication Dose Route Frequency Provider Last Rate Last Admin  diazePAM (VALIUM) tablet 5 mg  5 mg Oral Q12H PRN Brian Mcgill MD      
 fluticasone propionate (FLONASE) 50 mcg/actuation nasal spray 2 Spray  2 Spray Both Nostrils DAILY Brian Mcgill MD   2 Spray at 01/31/21 2254  budesonide-formoteroL (SYMBICORT) 160-4.5 mcg/actuation HFA inhaler 2 Puff  2 Puff Inhalation BID RT Brian Mcgill MD   Stopped at 01/31/21 0800  hydroCHLOROthiazide (HYDRODIURIL) tablet 25 mg  25 mg Oral DAILY Brian Mcgill MD   25 mg at 01/31/21 3094  pantoprazole (PROTONIX) tablet 40 mg  40 mg Oral ACB Brian Mcgill MD   40 mg at 01/31/21 3721  pravastatin (PRAVACHOL) tablet 40 mg  40 mg Oral DAILY Brian Mcgill MD   40 mg at 01/31/21 1474  propranolol LA (INDERAL LA) capsule 120 mg  120 mg Oral DAILY Belenda Gosselin D, MD   120 mg at 01/31/21 0800  
 sucralfate (CARAFATE) tablet 1 g  1 g Oral QID Brian Mcgill MD   1 g at 01/31/21 3358  sodium chloride (NS) flush 5-40 mL  5-40 mL IntraVENous Q8H Belenda Gosselin D, MD   10 mL at 01/31/21 4772  sodium chloride (NS) flush 5-40 mL  5-40 mL IntraVENous PRN Gelacio Macario MD      
 acetaminophen (TYLENOL) tablet 650 mg  650 mg Oral Q6H PRN Gelacio Macario MD      
 Or  
 acetaminophen (TYLENOL) suppository 650 mg  650 mg Rectal Q6H PRN Gelacio Macario MD      
 polyethylene glycol (MIRALAX) packet 17 g  17 g Oral DAILY PRN Gelacio Macario MD      
 promethazine (PHENERGAN) tablet 12.5 mg  12.5 mg Oral Q6H PRN Gelacio Macario MD      
 enoxaparin (LOVENOX) injection 40 mg  40 mg SubCUTAneous DAILY Ekaterina CHEUNG MD   40 mg at 21 0756  
 0.9% sodium chloride infusion  150 mL/hr IntraVENous CONTINUOUS Ekaterina CHEUNG  mL/hr at 21 0059 150 mL/hr at 21 0059  morphine injection 2 mg  2 mg IntraVENous Q3H PRN Gelacio Macario MD   2 mg at 21 5568  ondansetron (ZOFRAN) injection 4 mg  4 mg IntraVENous Q4H PRN Gelacio Macario MD      
 levothyroxine (SYNTHROID) tablet 88 mcg  88 mcg Oral ACB Gelacio Macario MD   88 mcg at 21 6558  
 FLUoxetine (PROzac) capsule 20 mg  20 mg Oral QHS Gelacio Macario MD      
 
 
OBJECTIVE: 
Patient Vitals for the past 8 hrs: 
 BP Temp Pulse Resp SpO2  
21 1138 (!) 162/88 98.2 °F (36.8 °C) 80 18 95 % 21 0726 (!) 171/94 98.2 °F (36.8 °C) (!) 113 20 96 % Temp (24hrs), Av.9 °F (36.6 °C), Min:97.6 °F (36.4 °C), Max:98.2 °F (36.8 °C) No intake/output data recorded. Physical Exam: 
Constitutional: Well developed, well nourished female in no acute distress, sitting comfortably in the hospital bed. HEENT: Normocephalic and atraumatic. Oropharynx is clear, mucous membranes are moist.  Sclerae anicteric. Neck supple without JVD. No thyromegaly present. Skin Warm and dry. No bruising and no rash noted. No erythema. No pallor. Respiratory Lungs with bilateral rhonchi, crackles in left base. No accessory muscle use. CVS Normal rate, regular rhythm and normal S1 and S2. No murmurs, gallops, or rubs. Abdomen Soft, mildly tender and nondistended, normoactive bowel sounds. No palpable mass. No hepatosplenomegaly. Neuro Grossly nonfocal with no obvious sensory or motor deficits. MSK Normal range of motion in general.  No edema and no tenderness. Psych Appropriate mood and affect. Labs:   
Recent Results (from the past 24 hour(s)) CBC WITH AUTOMATED DIFF Collection Time: 01/30/21  7:29 PM  
Result Value Ref Range WBC 12.2 (H) 4.3 - 11.1 K/uL  
 RBC 4.75 4.05 - 5.2 M/uL  
 HGB 12.3 11.7 - 15.4 g/dL HCT 37.2 35.8 - 46.3 % MCV 78.3 (L) 79.6 - 97.8 FL  
 MCH 25.9 (L) 26.1 - 32.9 PG  
 MCHC 33.1 31.4 - 35.0 g/dL  
 RDW 13.9 11.9 - 14.6 % PLATELET 394 (H) 113 - 450 K/uL MPV 9.5 9.4 - 12.3 FL ABSOLUTE NRBC 0.00 0.0 - 0.2 K/uL  
 DF AUTOMATED NEUTROPHILS 64 43 - 78 % LYMPHOCYTES 26 13 - 44 % MONOCYTES 9 4.0 - 12.0 % EOSINOPHILS 0 (L) 0.5 - 7.8 % BASOPHILS 0 0.0 - 2.0 % IMMATURE GRANULOCYTES 0 0.0 - 5.0 %  
 ABS. NEUTROPHILS 7.9 1.7 - 8.2 K/UL  
 ABS. LYMPHOCYTES 3.2 0.5 - 4.6 K/UL  
 ABS. MONOCYTES 1.0 0.1 - 1.3 K/UL  
 ABS. EOSINOPHILS 0.0 0.0 - 0.8 K/UL  
 ABS. BASOPHILS 0.0 0.0 - 0.2 K/UL  
 ABS. IMM. GRANS. 0.1 0.0 - 0.5 K/UL METABOLIC PANEL, COMPREHENSIVE Collection Time: 01/30/21  7:29 PM  
Result Value Ref Range Sodium 135 (L) 136 - 145 mmol/L Potassium 3.6 3.5 - 5.1 mmol/L Chloride 101 98 - 107 mmol/L  
 CO2 22 21 - 32 mmol/L Anion gap 12 7 - 16 mmol/L Glucose 114 (H) 65 - 100 mg/dL BUN 13 8 - 23 MG/DL Creatinine 0.96 0.6 - 1.0 MG/DL  
 GFR est AA >60 >60 ml/min/1.73m2 GFR est non-AA >60 >60 ml/min/1.73m2 Calcium 10.4 8.3 - 10.4 MG/DL Bilirubin, total 0.4 0.2 - 1.1 MG/DL  
 ALT (SGPT) 90 (H) 12 - 65 U/L  
 AST (SGOT) 99 (H) 15 - 37 U/L Alk. phosphatase 266 (H) 50 - 130 U/L Protein, total 8.1 6.3 - 8.2 g/dL Albumin 3.0 (L) 3.2 - 4.6 g/dL Globulin 5.1 (H) 2.3 - 3.5 g/dL A-G Ratio 0.6 (L) 1.2 - 3.5 LACTIC ACID Collection Time: 01/30/21  7:29 PM  
Result Value Ref Range Lactic acid 3.5 (H) 0.4 - 2.0 MMOL/L  
CULTURE, BLOOD Collection Time: 01/30/21  7:29 PM  
 Specimen: Blood Result Value Ref Range Special Requests: LEFT Antecubital 
    
 Culture result: NO GROWTH AFTER 11 HOURS    
CULTURE, BLOOD Collection Time: 01/30/21  7:30 PM  
 Specimen: Blood Result Value Ref Range Special Requests: RIGHT Antecubital 
    
 Culture result: NO GROWTH AFTER 11 HOURS    
EKG, 12 LEAD, INITIAL Collection Time: 01/30/21  7:58 PM  
Result Value Ref Range Ventricular Rate 119 BPM  
 Atrial Rate 119 BPM  
 P-R Interval 134 ms QRS Duration 74 ms Q-T Interval 344 ms QTC Calculation (Bezet) 483 ms Calculated P Axis 48 degrees Calculated R Axis 31 degrees Calculated T Axis 37 degrees Diagnosis    
  !! AGE AND GENDER SPECIFIC ECG ANALYSIS !! Sinus tachycardia Otherwise normal ECG No previous ECGs available Confirmed by Mirta Sinclair MD (), ED DERAS ([id number]) on 1/31/2021 8:01:34 AM 
  
URINALYSIS W/ RFLX MICROSCOPIC Collection Time: 01/30/21 10:04 PM  
Result Value Ref Range Color YELLOW Appearance CLOUDY Specific gravity 1.016 1.001 - 1.023    
 pH (UA) 7.0 5.0 - 9.0 Protein Negative NEG mg/dL Glucose Negative mg/dL Ketone Negative NEG mg/dL Bilirubin Negative NEG Blood Negative NEG Urobilinogen 0.2 0.2 - 1.0 EU/dL Nitrites Negative NEG Leukocyte Esterase MODERATE (A) NEG    
 WBC 20-50 0 /hpf  
 RBC 3-5 0 /hpf Epithelial cells 0-3 0 /hpf Bacteria 2+ (H) 0 /hpf LACTIC ACID Collection Time: 01/30/21 10:38 PM  
Result Value Ref Range Lactic acid 2.3 (H) 0.4 - 2.0 MMOL/L  
COVID-19 RAPID TEST Collection Time: 01/30/21 11:14 PM  
Result Value Ref Range Specimen source Please find results under separate order COVID-19 rapid test Not detected NOTD METABOLIC PANEL, COMPREHENSIVE Collection Time: 01/31/21  3:56 AM  
Result Value Ref Range Sodium 138 136 - 145 mmol/L Potassium 3.1 (L) 3.5 - 5.1 mmol/L Chloride 108 (H) 98 - 107 mmol/L  
 CO2 19 (L) 21 - 32 mmol/L Anion gap 11 7 - 16 mmol/L Glucose 114 (H) 65 - 100 mg/dL BUN 11 8 - 23 MG/DL Creatinine 0.69 0.6 - 1.0 MG/DL  
 GFR est AA >60 >60 ml/min/1.73m2 GFR est non-AA >60 >60 ml/min/1.73m2 Calcium 9.0 8.3 - 10.4 MG/DL Bilirubin, total 0.6 0.2 - 1.1 MG/DL  
 ALT (SGPT) 59 12 - 65 U/L  
 AST (SGOT) 55 (H) 15 - 37 U/L Alk. phosphatase 194 (H) 50 - 130 U/L Protein, total 6.2 (L) 6.3 - 8.2 g/dL Albumin 2.3 (L) 3.2 - 4.6 g/dL Globulin 3.9 (H) 2.3 - 3.5 g/dL A-G Ratio 0.6 (L) 1.2 - 3.5    
CBC WITH AUTOMATED DIFF Collection Time: 01/31/21  3:56 AM  
Result Value Ref Range WBC 8.9 4.3 - 11.1 K/uL  
 RBC 3.71 (L) 4.05 - 5.2 M/uL HGB 9.7 (L) 11.7 - 15.4 g/dL HCT 28.9 (L) 35.8 - 46.3 % MCV 77.9 (L) 79.6 - 97.8 FL  
 MCH 26.1 26.1 - 32.9 PG  
 MCHC 33.6 31.4 - 35.0 g/dL  
 RDW 13.8 11.9 - 14.6 % PLATELET 280 (H) 538 - 450 K/uL MPV 9.2 (L) 9.4 - 12.3 FL ABSOLUTE NRBC 0.00 0.0 - 0.2 K/uL  
 DF AUTOMATED NEUTROPHILS 72 43 - 78 % LYMPHOCYTES 19 13 - 44 % MONOCYTES 9 4.0 - 12.0 % EOSINOPHILS 0 (L) 0.5 - 7.8 % BASOPHILS 0 0.0 - 2.0 % IMMATURE GRANULOCYTES 1 0.0 - 5.0 %  
 ABS. NEUTROPHILS 6.4 1.7 - 8.2 K/UL  
 ABS. LYMPHOCYTES 1.7 0.5 - 4.6 K/UL  
 ABS. MONOCYTES 0.8 0.1 - 1.3 K/UL  
 ABS. EOSINOPHILS 0.0 0.0 - 0.8 K/UL  
 ABS. BASOPHILS 0.0 0.0 - 0.2 K/UL  
 ABS. IMM. GRANS. 0.1 0.0 - 0.5 K/UL MAGNESIUM Collection Time: 01/31/21  3:56 AM  
Result Value Ref Range Magnesium 1.8 1.8 - 2.4 mg/dL BILIRUBIN, DIRECT Collection Time: 01/31/21  3:56 AM  
Result Value Ref Range Bilirubin, direct 0.1 <0.4 MG/DL Imaging: MRI of the abdomen 
  
 INDICATION: History of pancreas and liver lesions 
  
Standard MRI sequences were obtained through the abdomen in multiple planes. Images were obtained before and after intravenous infusion of  12 mL of Dotarem. Compared with abdomen CT dated 07/12/2017 and chest CT dated 10/23/2020. 
  
FINDINGS: 
There are numerous small low-attenuation lesions in the liver, largest 
approximately 6 mm. Portal vein is patent. There is no bile duct dilatation. There is a subtle area of low signal in the pancreas near the junction of 
body/tail, 13 mm diameter. Lesion is best seen on precontrast T1 sequences. There is no pancreatic duct distention.   
  
There is an 11 mm nodule either in or adjacent to the left adrenal gland. Right 
adrenal gland is unremarkable. There is normal enhancement of the kidneys. There is no hydronephrosis. There are multiple enhancing bone lesions in the 
spine and pelvis. There is also small mass or area of atelectasis in the right 
lung base. 
  
IMPRESSION 1. Numerous lesions in the liver and bones consistent with metastatic disease. 2.  Small indeterminate lesion in the mid pancreas, possible primary pancreas 
tumor. 3.  Mass versus atelectasis in the right lung base. 
  
Consider PET scan for further evaluation, attempt to determine primary tumor ASSESSMENT: 
Problem List  Date Reviewed: 10/29/2020 Codes Class Noted * (Principal) Hepatic lesion ICD-10-CM: K76.9 ICD-9-CM: 573.8  1/30/2021 Leukocytosis ICD-10-CM: U04.967 ICD-9-CM: 288.60  1/30/2021 UTI (urinary tract infection) ICD-10-CM: N39.0 ICD-9-CM: 599.0  1/30/2021 Pancreatic lesion ICD-10-CM: K86.9 ICD-9-CM: 577.9  1/30/2021 Elevated LFTs ICD-10-CM: R79.89 ICD-9-CM: 790.6  1/30/2021 Lung nodule ICD-10-CM: R91.1 ICD-9-CM: 793.11  9/23/2020 Dyspepsia ICD-10-CM: R10.13 ICD-9-CM: 536.8  2/6/2019 Cervicalgia ICD-10-CM: M54.2 ICD-9-CM: 723.1  2/6/2019 Seborrheic keratoses, inflamed ICD-10-CM: L82.0 ICD-9-CM: 702.11  8/14/2018 History of cigarette smoking ICD-10-CM: Z87.891 ICD-9-CM: V15.82  7/10/2018 Gastroesophageal reflux disease without esophagitis ICD-10-CM: K21.9 ICD-9-CM: 530.81  5/17/2018 Palpitations ICD-10-CM: R00.2 ICD-9-CM: 785.1  7/25/2017 Microscopic hematuria ICD-10-CM: R31.29 ICD-9-CM: 599.72  6/27/2017 Memory loss ICD-10-CM: R41.3 ICD-9-CM: 780.93  10/5/2016 Breast mass in female ICD-10-CM: N63.0 ICD-9-CM: 611.72  10/27/2015 Fatigue due to depression ICD-10-CM: F32.9, R53.83 ICD-9-CM: 810, 780.79  10/23/2015 Acquired hypothyroidism ICD-10-CM: E03.9 ICD-9-CM: 244.9  10/23/2015 Essential hypertension ICD-10-CM: I10 
ICD-9-CM: 401.9  Unknown Depression, unipolar ([facility] [address] ICD-10-CM: F33.9 ICD-9-CM: 296.20  Unknown Pure hypercholesterolemia ICD-10-CM: E78.00 ICD-9-CM: 272.0  Unknown RECOMMENDATIONS: 
? Metastatic Pancreatic Cancer 
-check CEA, Ca 19-9, AFP, amylase, lipase 
-chest CT for staging 
-consult IR for biopsy Anemia 
-check TSAT, ferritin, folate, B12, Vit D Patient will discuss with  of where she would desire for future treatment, with us or with [facility]. This information will help us set up OP appointments appropriately. Lab studies and imaging studies (MRI) were personally reviewed. Pertinent old records were reviewed. Thank you for allowing us to participate in the care of Ms. Renu Blunt. We will follow along with you. Bita Uribe NP [facility] On[facility] [address] [place] [address] Office : [phone] Fax : [phone]

## 2021-01-31 NOTE — H&P
HOSPITALIST INITIAL HISTORY AND PHYSICAL 
 
NAME:  Adrienne Frankel Age:  76 y.o. 
:   1955 MRN:   [id number] PCP: Terrence Tejeda., MD 
Consulting MD: Treatment Team: Attending Provider: Yun Rivers MD; Primary Nurse: Solo Quinn CHIEF COMPLAINT: abdominal pain HISTORY OF PRESENT ILLNESS:  
Adrienne Frankel is a 76 y.o. female with a past medical history of recently discovered lesions of the pancreas, liver, and lung who presents to the ER with report of worsening abdominal pain for the past 2 - 3 months, becoming so severe today that she could not stand it. She also admits to nausea and diarrhea. Reports that she wants to vomit but can't. Had a CT scan at [facility] on 20 that was unremarkable. Had an abdominal US at [facility] on  that showed 1.8 cm lesion in pancreatic body. And questionable 8 mm lesion of R hepatic lobe. Pt reports that pain has improved at home with PO [place], but that it made her feel really weird. Ana M Diaz REVIEW OF SYSTEMS: Comprehensive ROS performed. Denies fevrs, chils, admits to nausea, diarrhea, no vomiting, otherwise negative. Past Medical History:  
Diagnosis Date  Anxiety state, unspecified  Dyslipidemia   
 HTN (hypertension)  Major depressive disorder, recurrent episode, in partial or unspecified remission  Tobacco abuse  Vertigo Past Surgical History:  
Procedure Laterality Date [facility] COLONOSCOPY  ,  [address] \[facility] [place] [facility]\"" [address] [facility] ABDOMINAL HYSTERECTOMY   [address] [facility] Prior to Admission Medications Prescriptions Last Dose Informant Patient Reported? Taking? CYANOCOBALAMIN, VITAMIN B-12, (VITAMIN B-12 PO)   Yes No  
Sig: Take  by mouth. FLUoxetine (PROZAC) 20 mg capsule   No No  
Sig: Take 3 Caps by mouth nightly.   
diazePAM (VALIUM) 5 mg tablet   No No  
 Sig: Take 1 Tab by mouth every twelve (12) hours as needed. Max Daily Amount: 10 mg.  
ergocalciferol (DRISDOL) 50,000 unit capsule   No No  
Sig: Take 1 Cap by mouth every seven (7) days. fluticasone (FLONASE) 50 mcg/actuation nasal spray   No No  
Si sprays each nostril QD  
fluticasone-salmeterol (ADVAIR) 250-50 mcg/dose diskus inhaler   No No  
Sig: Take 1 Puff by inhalation two (2) times a day. hydroCHLOROthiazide (HYDRODIURIL) 25 mg tablet   No No  
Sig: Take 1 Tab by mouth daily. ibuprofen (MOTRIN) 800 mg tablet   No No  
Sig: Take 1 Tab by mouth every eight (8) hours as needed for Pain.  
levothyroxine (SYNTHROID) 100 mcg tablet   No No  
Sig: Take 1 Tab by mouth Daily (before breakfast). magnesium oxide 500 mg tab   Yes No  
Sig: Take  by mouth. omeprazole (PRILOSEC) 40 mg capsule   No No  
Sig: Take 1 Cap by mouth daily. pravastatin (PRAVACHOL) 40 mg tablet   No No  
Sig: Take 1 Tab by mouth daily. propranolol LA (INDERAL LA) 120 mg SR capsule   No No  
Sig: Take 1 Cap by mouth daily. sucralfate (CARAFATE) 1 gram tablet   No No  
Sig: Take 1 Tab by mouth four (4) times daily. varenicline (CHANTIX STARTER ALIN) 0.5 mg (11)- 1 mg (42) DsPk   No No  
Sig: Take 0.5 mg by mouth two (2) times daily (after meals). Facility-Administered Medications: None Allergies Allergen Reactions  Sulfa (Sulfonamide Antibiotics) Anaphylaxis  Buspar [Buspirone] Vertigo  Pcn [Penicillins] Hives  Wellbutrin [Bupropion Hcl] Other (comments) Constipation FAMILY HISTORY: Reviewed. Negative except Family History Problem Relation Age of Onset  Coronary Artery Disease Father  Elevated Lipids Father  Hypertension Father  Heart Attack Father  Heart Disease Father  Bipolar Disorder Mother  Cancer Mother Leukemia/gastric  Alzheimer Maternal Grandmother  Heart Disease Maternal Grandfather  Cancer Maternal Grandfather  Breast Cancer Neg Hx Social History Tobacco Use  Smoking status: Current Every Day Smoker Packs/day: 1.00 Years: 48.00 Pack years: 48.00  Smokeless tobacco: Never Used Substance Use Topics  Alcohol use: Yes Comment: rare Objective:  
 
Visit Vitals BP (!) 144/88 Pulse (!) 105 Temp 97.7 °F (36.5 °C) Resp 22 Ht 4' 11\" (1.499 m) Wt 56.7 kg (125 lb) SpO2 100% BMI 25.25 kg/m² Temp (24hrs), Av.7 °F (36.5 °C), Min:97.7 °F (36.5 °C), Max:97.7 °F (36.5 °C) Oxygen Therapy O2 Sat (%): 100 % (21) Pulse via Oximetry: 105 beats per minute (21) O2 Device: Room air (21) Physical Exam: 
General:    The patient is a pleasant elderly female in no acute distress. Head:   Normocephalic/atraumatic. Eyes:  No palpebral pallor or scleral icterus. ENT:  External auricular and nasal exam within normal limits. Mucous membranes are moist. 
Neck:  Supple, non-tender, no JVD. Lungs:   Clear to auscultation bilaterally without wheezes or crackles. No respiratory distress or accessory muscle use. Heart:   Regular rate and rhythm, without murmurs, rubs, or gallops. Abdomen:   Tender to palpation throughout the abdomen. Normal bowel sounds. Genitourinary: No tenderness over the bladder or bilateral CVAs. Extremities: Without clubbing, cyanosis, or edema. Skin:     Normal color, texture, and turgor. No rashes, lesions, or jaundice. Pulses: Radial and dorsalis pedis pulses present 2+ bilaterally. Capillary refill <2s. Neurologic: CN II-XII grossly intact and symmetrical.  
  Moving all four extremities well with no focal deficits. Psychiatric: Pleasant demeanor, appropriate affect. Alert and oriented x 3 Data Review:  
Recent Results (from the past 24 hour(s)) CBC WITH AUTOMATED DIFF Collection Time: 21  7:29 PM  
Result Value Ref Range WBC 12.2 (H) 4.3 - 11.1 K/uL  
 RBC 4.75 4.05 - 5.2 M/uL HGB 12.3 11.7 - 15.4 g/dL HCT 37.2 35.8 - 46.3 % MCV 78.3 (L) 79.6 - 97.8 FL  
 MCH 25.9 (L) 26.1 - 32.9 PG  
 MCHC 33.1 31.4 - 35.0 g/dL  
 RDW 13.9 11.9 - 14.6 % PLATELET 551 (H) 877 - 450 K/uL MPV 9.5 9.4 - 12.3 FL ABSOLUTE NRBC 0.00 0.0 - 0.2 K/uL  
 DF AUTOMATED NEUTROPHILS 64 43 - 78 % LYMPHOCYTES 26 13 - 44 % MONOCYTES 9 4.0 - 12.0 % EOSINOPHILS 0 (L) 0.5 - 7.8 % BASOPHILS 0 0.0 - 2.0 % IMMATURE GRANULOCYTES 0 0.0 - 5.0 %  
 ABS. NEUTROPHILS 7.9 1.7 - 8.2 K/UL  
 ABS. LYMPHOCYTES 3.2 0.5 - 4.6 K/UL  
 ABS. MONOCYTES 1.0 0.1 - 1.3 K/UL  
 ABS. EOSINOPHILS 0.0 0.0 - 0.8 K/UL  
 ABS. BASOPHILS 0.0 0.0 - 0.2 K/UL  
 ABS. IMM. GRANS. 0.1 0.0 - 0.5 K/UL METABOLIC PANEL, COMPREHENSIVE Collection Time: 01/30/21  7:29 PM  
Result Value Ref Range Sodium 135 (L) 136 - 145 mmol/L Potassium 3.6 3.5 - 5.1 mmol/L Chloride 101 98 - 107 mmol/L  
 CO2 22 21 - 32 mmol/L Anion gap 12 7 - 16 mmol/L Glucose 114 (H) 65 - 100 mg/dL BUN 13 8 - 23 MG/DL Creatinine 0.96 0.6 - 1.0 MG/DL  
 GFR est AA >60 >60 ml/min/1.73m2 GFR est non-AA >60 >60 ml/min/1.73m2 Calcium 10.4 8.3 - 10.4 MG/DL Bilirubin, total 0.4 0.2 - 1.1 MG/DL  
 ALT (SGPT) 90 (H) 12 - 65 U/L  
 AST (SGOT) 99 (H) 15 - 37 U/L Alk. phosphatase 266 (H) 50 - 130 U/L Protein, total 8.1 6.3 - 8.2 g/dL Albumin 3.0 (L) 3.2 - 4.6 g/dL Globulin 5.1 (H) 2.3 - 3.5 g/dL A-G Ratio 0.6 (L) 1.2 - 3.5 LACTIC ACID Collection Time: 01/30/21  7:29 PM  
Result Value Ref Range Lactic acid 3.5 (H) 0.4 - 2.0 MMOL/L  
URINALYSIS W/ RFLX MICROSCOPIC Collection Time: 01/30/21 10:04 PM  
Result Value Ref Range Color YELLOW Appearance CLOUDY Specific gravity 1.016 1.001 - 1.023    
 pH (UA) 7.0 5.0 - 9.0 Protein Negative NEG mg/dL Glucose Negative mg/dL Ketone Negative NEG mg/dL Bilirubin Negative NEG Blood Negative NEG Urobilinogen 0.2 0.2 - 1.0 EU/dL Nitrites Negative NEG Leukocyte Esterase MODERATE (A) NEG    
 WBC 20-50 0 /hpf  
 RBC 3-5 0 /hpf Epithelial cells 0-3 0 /hpf Bacteria 2+ (H) 0 /hpf Imaging [facility] /Studies: Xr Chest [facility] Result Date: 1/30/2021 Normal chest. 
  
 
 
Assessment and Plan:  
 
Principal Problem: 
  Hepatic lesion (1/30/2021) On US report. Will get pancreatic multiphase MRI per radiology recommendation to further assess. May need biopsy. Active Problems: 
  Pancreatic lesion (1/30/2021) Per above. Elevated LFTs (1/30/2021) Follow CMP. Leukocytosis (1/30/2021) Follow CBC. UTI (urinary tract infection) (1/30/2021) IV Rocephin. IVF, follow up urine culture Acquired hypothyroidism (10/23/2015) Stable. Continue home meds. DVT Prophylaxis: Lovenox Code Status: FULL CODE Disposition: Admit to med/surg for evaluation and treatment as per above. Anticipated discharge: 2-3 days Signed By: Naheed Dugan MD   
 January 30, 2021

## 2021-01-31 NOTE — CONSULTS
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 duct measures 6 mm. 1.1 cm hyperechoic lesion within the superior left kidney, likely angiomyolipoma in correlation with prior CT scan. . The right kidney measures 10.4 cm and the left kidney measures 10.5 cm.  no ascites. 1/31/21 MRI abdomen SF - Numerous lesions in the liver and bones most consistent with metastatic disease. 2.  Small indeterminate lesion in the mid pancreas, possible primary pancreas tumor. 3.  Mass versus atelectasis in the right lung base. - need tissue diagnosis: at this time will place IR referral for liver bx eval, although likely difficult to obtain due to small size of lesions. Bone lesions were not noted on OSH CT - not sure if this could be an option for distant site bx. Image guided pancreatic lesion bx. GI eval.  Will get CT chest.   
- will check tumor markers. Alk phos elevated - possibly from bone disease, decreased with hydration; consider lumbar spine/pelvic MRI, has pain - no neurological symptoms at this time. - anemia - checking nutritional labs Lab studies and imaging studies (HIDA, CT, MRI, CXR, US) were personally reviewed. Pertinent records from OSH reviewed. Thank you for allowing me to participate in the care of Ms. Haley Juarez. Gray Chen MD 
[facility] [address] [place] [address] Office : [phone] Fax : [phone]

## 2021-01-31 NOTE — PROGRESS NOTES
TRANSFER - IN REPORT: 
 
Verbal report received from Patricia nichols Adrienne Frankel  being received from ED for routine progression of care. Report consisted of patients Situation, Background, Assessment and  
Recommendations(SBAR). Information from the following report(s) SBAR was reviewed with the receiving nurse. Opportunity for questions and clarification was provided. Assessment completed upon patients arrival to unit and care assumed.

## 2021-01-31 NOTE — ED NOTES
TRANSFER - OUT REPORT: 
 
Verbal report given to Elias Coronado RN on Ted Days  being transferred to [facility] [phone] for routine progression of care Report consisted of patients Situation, Background, Assessment and  
Recommendations(SBAR). Information from the following report(s) SBAR, ED Summary and MAR was reviewed with the receiving nurse. Lines:  
Peripheral IV 01/30/21 Right Antecubital (Active) Peripheral IV 01/30/21 Left Antecubital (Active) Opportunity for questions and clarification was provided. Patient transported with: 
 [employer] other

## 2021-01-31 NOTE — ED TRIAGE NOTES
Pt has only been able to keep liquids down since November, she has been having abdominal pain for about 3-4 months. Has had 5 negative Covid tests and seen mult times at [facility] for same problem. Diarrhea started back today. Also states she feels like her heart is racing and is SOB.

## 2021-01-31 NOTE — ED PROVIDER NOTES
69-year-old white female has been having recurrent abdominal pain with shortness of breath for the past 2 or 3 months. Has had some nausea and diarrhea. Denies vomiting. No fever or urinary changes. She has been seen several times for this. Most recently had outpatient ultrasound of the abdomen. This showed a pancreatic and hepatic lesion. MRI was recommended but has not been obtained yet. Patient denies chest pain. Has had no cough. The history is provided by the patient. Abdominal Pain Associated symptoms include diarrhea and nausea. Pertinent negatives include no fever, no vomiting, no dysuria, no headaches, no chest pain and no back pain. Shortness of Breath Associated symptoms include abdominal pain. Pertinent negatives include no fever, no headaches, no neck pain, no cough, no chest pain, no vomiting and no rash. Past Medical History:  
Diagnosis Date  Anxiety state, unspecified  Dyslipidemia   
 HTN (hypertension)  Major depressive disorder, recurrent episode, in partial or unspecified remission  Tobacco abuse  Vertigo Past Surgical History:  
Procedure Laterality Date [facility] COLONOSCOPY  2006, 2011 [address] [facility] [address] [id number] [address] ABDOMINAL HYSTERECTOMY  [phone] Family History:  
Problem Relation Age of Onset  Coronary Artery Disease Father  Elevated Lipids Father  Hypertension Father  Heart Attack Father  Heart Disease Father  Bipolar Disorder Mother  Cancer Mother Leukemia/gastric  Alzheimer Maternal Grandmother  Heart Disease Maternal Grandfather  Cancer Maternal Grandfather  Breast Cancer Neg Hx Social History Socioeconomic History  Marital status:  Spouse name: Not on file  Number of children: Not on file  Years of education: Not on file  Highest education level: Not on file Occupational History  Not on file Social Needs  Financial resource strain: Not on file  Food insecurity Worry: Not on file Inability: Not on file  Transportation needs Medical: Not on file Non-medical: Not on file Tobacco Use  Smoking status: Current Every Day Smoker Packs/day: 1.00 Years: 48.00 Pack years: 48.00  Smokeless tobacco: Never Used Substance and Sexual Activity  Alcohol use: Yes Comment: rare  Drug use: No  
 Sexual activity: Yes  
  Partners: Male Lifestyle  Physical activity Days per week: Not on file Minutes per session: Not on file  Stress: Not on file Relationships  Social connections Talks on phone: Not on file Gets together: Not on file Attends Mandaen service: Not on file Active member of club or organization: Not on file Attends meetings of clubs or organizations: Not on file Relationship status: Not on file  Intimate partner violence Fear of current or ex partner: Not on file Emotionally abused: Not on file Physically abused: Not on file Forced sexual activity: Not on file Other Topics Concern  Not on file Social History Narrative  Not on file ALLERGIES: Sulfa (sulfonamide antibiotics), Buspar [buspirone], Pcn [penicillins], and Wellbutrin [bupropion hcl] Review of Systems Constitutional: Positive for chills. Negative for fever. HENT: Negative for congestion. Respiratory: Positive for shortness of breath. Negative for cough. Cardiovascular: Positive for palpitations. Negative for chest pain. Gastrointestinal: Positive for abdominal pain, diarrhea and nausea. Negative for vomiting. Genitourinary: Negative for dysuria. Musculoskeletal: Negative for back pain and neck pain. Skin: Negative for rash. Neurological: Negative for headaches. Vitals:  
 01/30/21 1907 01/30/21 1951 BP: 106/74 Pulse: (!) 133 Resp: 22 Temp: 97.7 °F (36.5 °C) SpO2: 100% 100% Weight: 56.7 kg (125 lb) Height: 4' 11\" (1.499 m) Physical Exam 
Vitals signs and nursing note reviewed. Constitutional:   
   General: She is not in acute distress. Appearance: Normal appearance. She is not toxic-appearing. HENT:  
   Head: Normocephalic and atraumatic. Nose: Nose normal.  
   Mouth/Throat:  
   Mouth: Mucous membranes are dry. Pharynx: Oropharynx is clear. Eyes:  
   Conjunctiva/sclera: Conjunctivae normal.  
Neck: Musculoskeletal: Normal range of motion and neck supple. Cardiovascular:  
   Rate and Rhythm: Regular rhythm. Tachycardia present. Heart sounds: No murmur. Pulmonary:  
   Effort: Pulmonary effort is normal.  
   Breath sounds: Normal breath sounds. No wheezing or rales. Abdominal:  
   General: There is no distension. Palpations: Abdomen is soft. Tenderness: There is no abdominal tenderness. There is no guarding. Musculoskeletal: Normal range of motion. General: No swelling or tenderness. Skin: 
   General: Skin is warm and dry. Neurological:  
   Mental Status: She is alert and oriented to person, place, and time. Psychiatric:     
   Mood and Affect: Mood normal.     
   Behavior: Behavior normal.  
 
  
 
MDM Number of Diagnoses or Management Options Diagnosis management comments: Work shows leukocytosis 12.2, elevated platelets 891, normal basic panel, newly elevated LFTs with alkaline phosphatase 266 AST 99 ALT 90. Bilirubin normal.  Lactic acid elevated 3.5. Patient is being treated with IV fluids and antibiotics. As she had an ultrasound 2 days ago showing no gallstones or sludge I do not feel this needs to be repeated. She has also had a CT scan within the past month therefore I do not feel this needs to be repeated either. Urinalysis shows UTI. Patient is already been treated with antibiotics. Hospitalist consulted for admission for further treatment and possible MRI to further assess intra-abdominal lesions. Amount and/or Complexity of Data Reviewed Clinical lab tests: ordered and reviewed Tests in the radiology section of CPT®: ordered and reviewed Tests in the medicine section of CPT®: reviewed and ordered Independent visualization of images, tracings, or specimens: yes Risk of Complications, Morbidity, and/or Mortality Presenting problems: moderate Diagnostic procedures: moderate Management options: moderate Procedures

## 2021-01-31 NOTE — PROGRESS NOTES
[facility] Hospitalist Service Progress Note Assessment / Plan: 
 
#Abdominal pain JAN 31 -likely secondary to pancreatic and hepatic masses 
-Also MRI reveals bony metastases which are also because of ongoing pain 
-Start long-acting pain patient with MS Contin 
-IV morphine as needed breakthrough pain #Malignancy JAN 31 -MRI shows hepatic and bony metastases likely from pancreatic primary 
-Appreciate input from oncology service 
-check CEA, Ca 19-9, AFP, amylase, lipase 
-chest CT for staging 
-consult IR for biopsy 
  
#Anemia JAN 31 -patient's hemoglobin dropped from 12 down to 9.7 
-check TSAT, ferritin, folate, B12, Vit D #Urinary tract infection JAN 31 -continue IV antibiotics and IV fluids #Nausea/decreased p.o. intake /protein calorie malnutrition JAN 31 -patient is unable to tolerate p.o. intake and is at risk of dehydration with UTI and malignancy as above 
-Continue IV fluids 
-Add PPI and Carafate as well as viscous lidocaine to help encourage p.o. intake 
-General diet as tolerated 
-Patient has moderate protein calorie malnutrition which is likely to get worse with her abdominal pain. Recommend dietary consult #Hypokalemia JAN 31 -replace with IV potassium and follow #Transaminitis JAN 31 -likely secondary to hepatic metastases as above 
-Follow Chief Complaint : Abdominal Pain and Shortness of Breath Subjective: 
Pain is currently 4 out of 10 down from 10 out of 10 prior to admission. Patient still has severe nausea at the site or even thought of food or drink. Patient is unable to tolerate any p.o. intake Objective: 
Visit Vitals BP (!) 162/88 (BP 1 Location: Right upper arm, BP Patient Position: Supine) Pulse 80 Temp 98.2 °F (36.8 °C) Resp 18 Ht 4' 11\" (1.499 m) Wt 56.9 kg (125 lb 6 oz) SpO2 95% BMI 25.32 kg/m² Physical Exam: 
General: Mild distress, speaking in full sentences, no use of accessory muscles HEENT: Pupils equal and reactive to light and accommodation, oropharynx is clear, anicteric sclera Neck: Supple, no lymphadenopathy, no JVD Lungs: Clear to auscultation bilaterally Cardiovascular: Regular rate and rhythm with normal S1 and S2 Abdomen: Soft, slightly tender diffusely, slightly distended, decreased bowel sounds Extremities: No cyanosis clubbing or edema Neuro: Nonfocal, A&O x3  
Psych: Tearful Intake and Output: 
Date 01/30/21 0700 - 01/31/21 4735 01/31/21 0700 - 02/01/21 7057 Shift [id number] 1900-0659 24 Hour Total 3771-9413 8416-3214 24 Hour Total  
INTAKE  
I.V.  635.3(0.9) 635.3(0.5) I.V.  635.3 635.3 Shift Total(mL/kg)  635. 3(11.2) 635. 3(11.2) OUTPUT Urine  350(0.5) 350(0.3) Urine Voided  350 350 Shift Total(mL/kg)  350(6.2) 350(6.2) NET  285.3 285. 3 Weight (kg)  56.9 56.9 56.9 56.9 56.9 LAB: 
Admission on 01/30/2021 Component Date Value  WBC 01/30/2021 12.2*  
 RBC 01/30/2021 4.75   
 HGB 01/30/2021 12.3   
 HCT 01/30/2021 37.2  MCV 01/30/2021 78.3*  
 MCH 01/30/2021 25.9*  
 MCHC 01/30/2021 33.1  RDW 01/30/2021 13.9  PLATELET 66/44/1480 249*  MPV 01/30/2021 9.5  ABSOLUTE NRBC 01/30/2021 0.00  DF 01/30/2021 AUTOMATED  NEUTROPHILS 01/30/2021 64   
 LYMPHOCYTES 01/30/2021 26   
 MONOCYTES 01/30/2021 9   
 EOSINOPHILS 01/30/2021 0*  
 BASOPHILS 01/30/2021 0   
 IMMATURE GRANULOCYTES 01/30/2021 0  ABS. NEUTROPHILS 01/30/2021 7.9  ABS. LYMPHOCYTES 01/30/2021 3.2  ABS. MONOCYTES 01/30/2021 1.0  ABS. EOSINOPHILS 01/30/2021 0.0  ABS. BASOPHILS 01/30/2021 0.0  ABS. IMM. GRANS. 01/30/2021 0.1  Sodium 01/30/2021 135*  Potassium 01/30/2021 3.6  Chloride 01/30/2021 101   
 CO2 01/30/2021 22   
 Anion gap 01/30/2021 12  Glucose 01/30/2021 114*  
 BUN 01/30/2021 13   
 Creatinine 01/30/2021 0.96   
 GFR est AA 01/30/2021 >60   
 GFR est non-AA 01/30/2021 >60   
  Calcium 01/30/2021 10.4  Bilirubin, total 01/30/2021 0.4  ALT (SGPT) 01/30/2021 90*  AST (SGOT) 01/30/2021 99*  Alk. phosphatase 01/30/2021 266*  Protein, total 01/30/2021 8.1  Albumin 01/30/2021 3.0*  Globulin 01/30/2021 5.1*  A-G Ratio 01/30/2021 0.6*  Lactic acid 01/30/2021 3.5*  Special Requests: 01/30/2021 Value:LEFT Antecubital 
  
 Culture result: 01/30/2021 NO GROWTH AFTER 11 HOURS  Special Requests: 01/30/2021 Value:RIGHT Antecubital 
  
 Culture result: 01/30/2021 NO GROWTH AFTER 11 HOURS  Color 01/30/2021 YELLOW  Appearance 01/30/2021 CLOUDY  Specific gravity 01/30/2021 1.016   
 pH (UA) 01/30/2021 7.0  Protein 01/30/2021 Negative  Glucose 01/30/2021 Negative  Ketone 01/30/2021 Negative  Bilirubin 01/30/2021 Negative  Blood 01/30/2021 Negative  Urobilinogen 01/30/2021 0.2  Nitrites 01/30/2021 Negative  Leukocyte Esterase 01/30/2021 MODERATE*  WBC 01/30/2021 20-50  RBC 01/30/2021 3-5  Epithelial cells 01/30/2021 0-3  Bacteria 01/30/2021 2+*  Ventricular Rate 01/30/2021 119  Atrial Rate 01/30/2021 119  P-R Interval 01/30/2021 134  QRS Duration 01/30/2021 74  Q-T Interval 01/30/2021 344  QTC Calculation (Bezet) 01/30/2021 483  Calculated P Axis 01/30/2021 48  Calculated R Axis 01/30/2021 31   
 Calculated T Axis 01/30/2021 37   
 Diagnosis 01/30/2021 Value:!! AGE AND GENDER SPECIFIC ECG ANALYSIS !! Sinus tachycardia Otherwise normal ECG No previous ECGs available Confirmed by Rasheed Horowitz MD (), ED DERAS ([id number]) on 1/31/2021 8:01:34 AM 
  
 Lactic acid 01/30/2021 2.3*  Specimen source 01/30/2021 Please find results under separate order  COVID-19 rapid test 01/30/2021 Not detected  Sodium 01/31/2021 138  Potassium 01/31/2021 3.1*  Chloride 01/31/2021 108*  
 CO2 01/31/2021 19*  
  Anion gap 01/31/2021 11  Glucose 01/31/2021 114*  
 BUN 01/31/2021 11   
 Creatinine 01/31/2021 0.69  GFR est AA 01/31/2021 >60   
 GFR est non-AA 01/31/2021 >60   
 Calcium 01/31/2021 9.0  Bilirubin, total 01/31/2021 0.6  ALT (SGPT) 01/31/2021 59   
 AST (SGOT) 01/31/2021 55*  Alk. phosphatase 01/31/2021 194*  
 Protein, total 01/31/2021 6.2*  
 Albumin 01/31/2021 2.3*  Globulin 01/31/2021 3.9*  A-G Ratio 01/31/2021 0.6*  WBC 01/31/2021 8.9  RBC 01/31/2021 3.71*  HGB 01/31/2021 9.7*  
 HCT 01/31/2021 28.9*  
 MCV 01/31/2021 77.9*  
 MCH 01/31/2021 26.1  MCHC 01/31/2021 33.6  RDW 01/31/2021 13.8  PLATELET 81/61/6776 426*  MPV 01/31/2021 9.2*  
 ABSOLUTE NRBC 01/31/2021 0.00  DF 01/31/2021 AUTOMATED  NEUTROPHILS 01/31/2021 72  LYMPHOCYTES 01/31/2021 19   
 MONOCYTES 01/31/2021 9   
 EOSINOPHILS 01/31/2021 0*  
 BASOPHILS 01/31/2021 0   
 IMMATURE GRANULOCYTES 01/31/2021 1  ABS. NEUTROPHILS 01/31/2021 6.4  ABS. LYMPHOCYTES 01/31/2021 1.7  ABS. MONOCYTES 01/31/2021 0.8  ABS. EOSINOPHILS 01/31/2021 0.0  ABS. BASOPHILS 01/31/2021 0.0  ABS. IMM. GRANS. 01/31/2021 0.1  Magnesium 01/31/2021 1.8  Bilirubin, direct 01/31/2021 0.1 IMAGING: 
Mri Abd W Wo Cont Result Date: 1/31/2021 1. Numerous lesions in the liver and bones consistent with metastatic disease. 2.  Small indeterminate lesion in the mid pancreas, possible primary pancreas tumor. 3.  Mass versus atelectasis in the right lung base. Consider PET scan for further evaluation, attempt to determine primary tumor If there are any questions about this report, I can be reached on [employer] or at [phone] Xr Chest [facility] Result Date: 1/30/2021 Normal chest. 
 
 
EKG: 
No results found for this or any previous visit.  
 
 
 
 
Khadijah Stevens MD 
1/31/2021 2:01 PM

## 2021-02-01 NOTE — PROGRESS NOTES
Problem: Falls - Risk of 
Goal: *Absence of Falls Description: Document Cinthya Jones Fall Risk and appropriate interventions in the flowsheet. Outcome: Progressing Towards Goal 
Note: Fall Risk Interventions: 
Mobility Interventions: Communicate number of staff needed for ambulation/transfer, Patient to call before getting OOB Medication Interventions: Patient to call before getting OOB, Teach patient to arise slowly, Evaluate medications/consider consulting pharmacy Elimination Interventions: Bed/chair exit alarm, Call light in reach, Patient to call for help with toileting needs

## 2021-02-01 NOTE — PROGRESS NOTES
Patient Vitals for the past 12 hrs: 
 Temp Pulse Resp BP SpO2  
02/01/21 1529 97.7 °F (36.5 °C) 75 18 109/65 97 % 02/01/21 1328 97.4 °F (36.3 °C)      
02/01/21 1132  76 17 109/63 96 % 02/01/21 0847     98 % 02/01/21 0731 97.6 °F (36.4 °C) 79 17 133/68 96 % New IV started in patient's R hand per other RN. Patient received KCL 20meq x's 2. PRN tylenol given x's 1 for pain. Patient to have liver bx tomorrow at 0700. Transport set up with [employer] for 0600. Patient to be NPO at midnight for this procedure.  
 
Bedside shift report given to Nathalia Sahu

## 2021-02-01 NOTE — PROGRESS NOTES
[facility]ist Service Progress Note Assessment / Plan: 
 
#Abdominal pain JAN 31 -likely secondary to pancreatic and hepatic masses 
-Also MRI reveals bony metastases which are also because of ongoing pain 
-Start long-acting pain patient with MS Contin 
-IV morphine as needed breakthrough pain February 1-pain is much better controlled with MS Contin 
-Plan to continue MS Contin on discharge with short acting pain relievers for breakthrough pain #Malignancy JAN 31 -MRI shows hepatic and bony metastases likely from pancreatic primary 
-Appreciate input from oncology service 
-check CEA, Ca 19-9, AFP, amylase, lipase 
-chest CT for staging 
-consult IR for biopsy February 1-IR hepatic biopsy scheduled for tomorrow 
-Some conflicting information as CT of the chest shows a right hilar mass which is consistent with primary lung cancer and notes that the pancreatic mass seen on the MRI is likely a fatty change instead of the tumor 
-Biopsy is necessary for all next steps 
  
#Anemia JAN 31 -patient's hemoglobin dropped from 12 down to 9.7 
-check TSAT, ferritin, folate, B12, Vit D #Urinary tract infection JAN 31 -continue IV antibiotics and IV fluids February 1-continue IV fluids and IV antibiotics, plan to convert to oral Cipro on discharge #Nausea/decreased p.o. intake /protein calorie malnutrition JAN 31 -patient is unable to tolerate p.o. intake and is at risk of dehydration with UTI and malignancy as above 
-Continue IV fluids 
-Add PPI and Carafate as well as viscous lidocaine to help encourage p.o. intake 
-General diet as tolerated 
-Patient has moderate protein calorie malnutrition which is likely to get worse with her abdominal pain. Recommend dietary consult February 1-appreciate dietary input. Patient is actually feeling better and has started eating 
-Continue PPI and Carafate and viscous lidocaine until discharge at which point patient will continue PPI and Carafate only #Hypokalemia JAN 31 -replace with IV potassium and follow February 1-potassium 3.4, will switch to oral replacement #Transaminitis JAN 31 -likely secondary to hepatic metastases as above 
-Follow February 1-await hepatic biopsy tomorrow Chief Complaint : Abdominal Pain and Shortness of Breath Subjective: 
Significant reduction in nausea, patient is able to tolerate small amounts of p.o. intake Objective: 
Visit Vitals /65 (BP 1 Location: Left upper arm, BP Patient Position: Supine) Pulse 75 Temp 97.7 °F (36.5 °C) Resp 18 Ht 4' 11\" (1.499 m) Wt 56.9 kg (125 lb 6 oz) SpO2 97% BMI 25.34 kg/m² Physical Exam: 
General: No acute distress, speaking in full sentences, no use of accessory muscles HEENT: Pupils equal and reactive to light and accommodation, oropharynx is clear, anicteric sclera Neck: Supple, no lymphadenopathy, no JVD Lungs: Clear to auscultation bilaterally Cardiovascular: Regular rate and rhythm with normal S1 and S2 Abdomen: Soft, slightly tender diffusely, slightly distended, decreased bowel sounds Extremities: No cyanosis clubbing or edema Neuro: Nonfocal, A&O x3  
Psych: Calm Intake and Output: 
Date 01/31/21 0700 - 02/01/21 5839 02/01/21 0700 - 02/02/21 5661 Shift 3703-3846 6447-7709 24 Hour Total 8074-0462 8622-3688 24 Hour Total  
INTAKE  
P.O. 340  340 240  240  
  P. O. 340  340 240  240  
I. V.(mL/kg/hr)  1319(1.9) 1319(1) 200  200 I.V.  1319 1319 200  200 Shift Total(mL/kg) 340(6) 9402(06.2) [id number]) 440(7.7)  440(7.7) OUTPUT Urine(mL/kg/hr) 300(0.4) 200(0.3) 500(0.4) Urine Voided 300 200 500 Urine Occurrence(s)    1 x  1 x Stool Stool Occurrence(s)    1 x  1 x Shift Total(mL/kg) 300(5.3) 200(3.5) 500(8.8) NET 40 1119 1159 440  440 Weight (kg) 56.9 56.9 56.9 56.9 56.9 56.9 LAB: 
Admission on 01/30/2021 Component Date Value  WBC 01/30/2021 12.2*  
  RBC 01/30/2021 4.75   
 HGB 01/30/2021 12.3   
 HCT 01/30/2021 37.2  MCV 01/30/2021 78.3*  
 MCH 01/30/2021 25.9*  
 MCHC 01/30/2021 33.1  RDW 01/30/2021 13.9  PLATELET 12/43/9459 758*  MPV 01/30/2021 9.5  ABSOLUTE NRBC 01/30/2021 0.00  DF 01/30/2021 AUTOMATED  NEUTROPHILS 01/30/2021 64   
 LYMPHOCYTES 01/30/2021 26   
 MONOCYTES 01/30/2021 9   
 EOSINOPHILS 01/30/2021 0*  
 BASOPHILS 01/30/2021 0   
 IMMATURE GRANULOCYTES 01/30/2021 0  ABS. NEUTROPHILS 01/30/2021 7.9  ABS. LYMPHOCYTES 01/30/2021 3.2  ABS. MONOCYTES 01/30/2021 1.0  ABS. EOSINOPHILS 01/30/2021 0.0  ABS. BASOPHILS 01/30/2021 0.0  ABS. IMM. GRANS. 01/30/2021 0.1  Sodium 01/30/2021 135*  Potassium 01/30/2021 3.6  Chloride 01/30/2021 101   
 CO2 01/30/2021 22   
 Anion gap 01/30/2021 12  Glucose 01/30/2021 114*  
 BUN 01/30/2021 13   
 Creatinine 01/30/2021 0.96   
 GFR est AA 01/30/2021 >60   
 GFR est non-AA 01/30/2021 >60   
 Calcium 01/30/2021 10.4  Bilirubin, total 01/30/2021 0.4  ALT (SGPT) 01/30/2021 90*  AST (SGOT) 01/30/2021 99*  Alk. phosphatase 01/30/2021 266*  Protein, total 01/30/2021 8.1  Albumin 01/30/2021 3.0*  Globulin 01/30/2021 5.1*  A-G Ratio 01/30/2021 0.6*  Lactic acid 01/30/2021 3.5*  Special Requests: 01/30/2021 Value:LEFT Antecubital 
  
 Culture result: 01/30/2021 NO GROWTH 2 DAYS  Special Requests: 01/30/2021 Value:RIGHT Antecubital 
  
 Culture result: 01/30/2021 NO GROWTH 2 DAYS  Color 01/30/2021 YELLOW  Appearance 01/30/2021 CLOUDY  Specific gravity 01/30/2021 1.016   
 pH (UA) 01/30/2021 7.0  Protein 01/30/2021 Negative  Glucose 01/30/2021 Negative  Ketone 01/30/2021 Negative  Bilirubin 01/30/2021 Negative  Blood 01/30/2021 Negative  Urobilinogen 01/30/2021 0.2  Nitrites 01/30/2021 Negative  Leukocyte Esterase 01/30/2021 MODERATE*  WBC 01/30/2021 20-50  RBC 01/30/2021 3-5  Epithelial cells 01/30/2021 0-3  Bacteria 01/30/2021 2+*  Ventricular Rate 01/30/2021 119  Atrial Rate 01/30/2021 119  P-R Interval 01/30/2021 134  QRS Duration 01/30/2021 74  Q-T Interval 01/30/2021 344  QTC Calculation (Bezet) 01/30/2021 483  Calculated P Axis 01/30/2021 48  Calculated R Axis 01/30/2021 31   
 Calculated T Axis 01/30/2021 37   
 Diagnosis 01/30/2021 Value:!! AGE AND GENDER SPECIFIC ECG ANALYSIS !! Sinus tachycardia Otherwise normal ECG No previous ECGs available Confirmed by Do Correa MD (), ED DERAS ([id number]) on 1/31/2021 8:01:34 AM 
  
 Lactic acid 01/30/2021 2.3*  Specimen source 01/30/2021 Please find results under separate order  COVID-19 rapid test 01/30/2021 Not detected  Sodium 01/31/2021 138  Potassium 01/31/2021 3.1*  Chloride 01/31/2021 108*  
 CO2 01/31/2021 19*  Anion gap 01/31/2021 11  Glucose 01/31/2021 114*  
 BUN 01/31/2021 11   
 Creatinine 01/31/2021 0.69  GFR est AA 01/31/2021 >60   
 GFR est non-AA 01/31/2021 >60   
 Calcium 01/31/2021 9.0  Bilirubin, total 01/31/2021 0.6  ALT (SGPT) 01/31/2021 59   
 AST (SGOT) 01/31/2021 55*  Alk. phosphatase 01/31/2021 194*  
 Protein, total 01/31/2021 6.2*  
 Albumin 01/31/2021 2.3*  Globulin 01/31/2021 3.9*  A-G Ratio 01/31/2021 0.6*  WBC 01/31/2021 8.9  RBC 01/31/2021 3.71*  HGB 01/31/2021 9.7*  
 HCT 01/31/2021 28.9*  
 MCV 01/31/2021 77.9*  
 MCH 01/31/2021 26.1  MCHC 01/31/2021 33.6  RDW 01/31/2021 13.8  PLATELET 34/32/8804 554*  MPV 01/31/2021 9.2*  
 ABSOLUTE NRBC 01/31/2021 0.00  DF 01/31/2021 AUTOMATED  NEUTROPHILS 01/31/2021 72  LYMPHOCYTES 01/31/2021 19   
 MONOCYTES 01/31/2021 9   
 EOSINOPHILS 01/31/2021 0*  
 BASOPHILS 01/31/2021 0  IMMATURE GRANULOCYTES 01/31/2021 1  ABS. NEUTROPHILS 01/31/2021 6.4  ABS. LYMPHOCYTES 01/31/2021 1.7  ABS. MONOCYTES 01/31/2021 0.8  ABS. EOSINOPHILS 01/31/2021 0.0  ABS. BASOPHILS 01/31/2021 0.0  ABS. IMM. GRANS. 01/31/2021 0.1  Magnesium 01/31/2021 1.8  Bilirubin, direct 01/31/2021 0.1  Sodium 02/01/2021 138  Potassium 02/01/2021 3.4*  Chloride 02/01/2021 108*  
 CO2 02/01/2021 21   
 Anion gap 02/01/2021 9  Glucose 02/01/2021 88   
 BUN 02/01/2021 6*  Creatinine 02/01/2021 0.59*  GFR est AA 02/01/2021 >60   
 GFR est non-AA 02/01/2021 >60   
 Calcium 02/01/2021 9.1  Bilirubin, total 02/01/2021 0.3  ALT (SGPT) 02/01/2021 40   
 AST (SGOT) 02/01/2021 27  Alk. phosphatase 02/01/2021 178*  Protein, total 02/01/2021 5.9*  
 Albumin 02/01/2021 2.2*  Globulin 02/01/2021 3.7*  A-G Ratio 02/01/2021 0.6*  WBC 02/01/2021 9.1  RBC 02/01/2021 3.79*  HGB 02/01/2021 9.8*  
 HCT 02/01/2021 30.5*  MCV 02/01/2021 80.5  MCH 02/01/2021 25.9*  
 MCHC 02/01/2021 32.1  RDW 02/01/2021 14.5  PLATELET 59/78/3025 140*  MPV 02/01/2021 9.4  ABSOLUTE NRBC 02/01/2021 0.00  DF 02/01/2021 AUTOMATED  NEUTROPHILS 02/01/2021 72  LYMPHOCYTES 02/01/2021 18   
 MONOCYTES 02/01/2021 9   
 EOSINOPHILS 02/01/2021 0*  
 BASOPHILS 02/01/2021 0   
 IMMATURE GRANULOCYTES 02/01/2021 0  ABS. NEUTROPHILS 02/01/2021 6.5  ABS. LYMPHOCYTES 02/01/2021 1.7  ABS. MONOCYTES 02/01/2021 0.8  ABS. EOSINOPHILS 02/01/2021 0.0  ABS. BASOPHILS 02/01/2021 0.0  ABS. IMM. GRANS. 02/01/2021 0.0  Transferrin 02/01/2021 119*  Iron 02/01/2021 23*  TIBC 02/01/2021 139*  Transferrin Saturation 02/01/2021 17  Ferritin 02/01/2021 113  Magnesium 02/01/2021 1.6*  Amylase 02/01/2021 38  Lipase 02/01/2021 107  CEA 02/01/2021 140.7*  Cancer antigen 19-9 02/01/2021 2,367.30*  AFP, Tumor marker 02/01/2021 2.30  Vitamin B12 02/01/2021 458  Vitamin D 25-Hydroxy 02/01/2021 78.0  Folate 02/01/2021 4.9  Lactic acid 02/01/2021 1.2  Sodium 02/01/2021 137  Potassium 02/01/2021 3.4*  Chloride 02/01/2021 108*  
 CO2 02/01/2021 22   
 Anion gap 02/01/2021 7  Glucose 02/01/2021 97   
 BUN 02/01/2021 6*  Creatinine 02/01/2021 0.59*  GFR est AA 02/01/2021 >60   
 GFR est non-AA 02/01/2021 >60   
 Calcium 02/01/2021 9.1  Magnesium 02/01/2021 1.7* IMAGING: 
Mri Abd W Wo Cont Result Date: 1/31/2021 1. Numerous lesions in the liver and bones consistent with metastatic disease. 2.  Small indeterminate lesion in the mid pancreas, possible primary pancreas tumor. 3.  Mass versus atelectasis in the right lung base. Consider PET scan for further evaluation, attempt to determine primary tumor If there are any questions about this report, I can be reached on [employer] or at [phone] Ct Chest W Cont Result Date: 1/31/2021 Right hilar mass with subcarinal extension, likely primary lung cancer. Xr Chest [facility] Result Date: 1/30/2021 Normal chest. 
 
 
EKG: 
No results found for this or any previous visit.  
 
 
 
 
Cesar Macedo MD 
2/1/2021 2:01 PM

## 2021-02-01 NOTE — CONSULTS
Comprehensive Nutrition Assessment Type and Reason for Visit: Initial, Consult Poor intake/appetite 5 or more days (Hospitalist) Nutrition Recommendations/Plan:  
? Continue current diet. ? Small frequent meals recommended 
? Start Ensure Enlive TID to use between meals (350 kcal and 20 g pro per bottle) Malnutrition Assessment: 
Malnutrition Status: At risk for malnutrition (specify)(poor PO, wt loss) Nutrition Assessment:  
Nutrition History:  helps with history. He states patient only really tolerating liquids since just after Thanksgiving. He reports mostly water, Pedialyte, chicken and stars soup, broth. She has recently started using CIB or Boost. He states if she does eat anything solid it is typically just a bite or 2. Nutrition Background: Patient with PMH significant for HLD, HTN, recently discovered lesion of pancreas, liver, bone. Daily Update: 
Patient seen with  at bedside. She states that her PO intake has been limited secondary to pain and nausea. She states that currently her nausea has resolved. She also reports that even when she feels like she could eat something, once she gets set up the volume of food is overwhelming. She is open to Ensure and would like chocolate. Recommended small frequent meals and rationale. Recommended using smaller container to portion out smaller portions during meals. Nutrition Related Findings:  
Did not appreciate any wasting Current Nutrition Therapies: DIET REGULAR 
DIET NPO Current Intake: Average Meal Intake: 1-25% Average Supplement Intake: None ordered Anthropometric Measures: 
Height: 4' 11\" (149.9 cm) Current Body Wt: 56.9 kg (125 lb 7.1 oz)(1/31), Weight source: Standing scale BMI: 25.3, Overweight (BMI 25.0-29. 9) Ideal Body Wt: 95 lbs (43 kg), 132 % Usual Body Wt: 65.3 kg (144 lb)(per review of outpt office weights), Percent weight change: -12.9 Estimated Daily Nutrient Needs: Energy (kcal/day): 9798-6365 (Kcal/kg(25-30), Weight Used: Current(56.9 kg (1/31))) Protein (g/day): 57-74 (1-1.3 g/kg) Weight Used: (Current) Fluid (ml/day):   (1 ml/kcal) Nutrition Diagnosis:  
· Unintended weight loss related to inadequate protein-energy intake as evidenced by (diet recall, wt loss) Nutrition Interventions:  
Food and/or Nutrient Delivery: Continue current diet, Start oral nutrition supplement Coordination of Nutrition Care: Continue to monitor while inpatient Goals: Active Goal: Meet at least 75% nutrition needs within 7 days Nutrition Monitoring and Evaluation:  
  
Food/Nutrient Intake Outcomes: Food and nutrient intake Discharge Planning:   
Continue oral nutrition supplement [address] [place], LD on 2/1/2021 at 2:23 PM 
Contact: [phone] Dis[employer] active

## 2021-02-01 NOTE — PROGRESS NOTES
[facility] Hematology & Oncology Inpatient Hematology / Oncology Progress Note Admission Date: 2021  7:22 PM 
Reason for Admission/Hospital Course: Hepatic lesion [K76.9] Abdominal pain [R10.9] 24 Hour Events: 
IR tomorrow for biopsy-still discussing site Tumor markers pending Patient still processing information ROS: 
Constitutional: negative for fever, chills. +weakness, malaise, fatigue. CV: negative for chest pain, palpitations, edema. Respiratory:  negative for dyspnea, cough, wheezing. GI: positive for nausea, decreased appetite, abdominal tenderness 10 point review of systems is otherwise negative with the exception of the elements mentioned above in the HPI. Allergies Allergen Reactions  Sulfa (Sulfonamide Antibiotics) Anaphylaxis  Buspar [Buspirone] Vertigo  Pcn [Penicillins] Hives  Wellbutrin [Bupropion Hcl] Other (comments) Constipation OBJECTIVE: 
Patient Vitals for the past 8 hrs: 
 BP Temp Pulse Resp SpO2  
21 0847     98 % 21 0731 133/68 97.6 °F (36.4 °C) 79 17 96 % 21 0331 127/68 97.7 °F (36.5 °C) 84 18 97 % Temp (24hrs), Av.7 °F (36.5 °C), Min:97.3 °F (36.3 °C), Max:98.2 °F (36.8 °C) No intake/output data recorded. Physical Exam: 
Constitutional: Well developed, well nourished female in no acute distress, sitting comfortably in the hospital bed. HEENT: Normocephalic and atraumatic. Oropharynx is clear, mucous membranes are moist.  Pupils are equal, round, and reactive to light. Extraocular muscles are intact. Sclerae anicteric. Skin Warm and dry. No bruising and no rash noted. No erythema. No pallor. Respiratory Lungs are clear to auscultation bilaterally without wheezes, rales or rhonchi, normal air exchange without accessory muscle use. CVS Normal rate, regular rhythm and normal S1 and S2. No murmurs, gallops, or rubs. Abdomen Soft,  nondistended, tender mid to left LQ, normoactive bowel sounds. Neuro Grossly nonfocal with no obvious sensory or motor deficits. MSK Normal range of motion in general.  No edema and no tenderness. Psych Appropriate mood and affect. Labs: 
   
Recent Labs 02/01/21 
1518 01/31/21 
0356 01/30/21 1929 WBC 9.1 8.9 12.2*  
RBC 3.79* 3.71* 4.75 HGB 9.8* 9.7* 12.3 HCT 30.5* 28.9* 37.2 MCV 80.5 77.9* 78.3*  
MCH 25.9* 26.1 25.9*  
MCHC 32.1 33.6 33.1 RDW 14.5 13.8 13.9 * 479* 663* GRANS 72 72 64 LYMPH 18 19 26 MONOS 9 9 9 EOS 0* 0* 0*  
BASOS 0 0 0 IG 0 1 0  
DF AUTOMATED AUTOMATED AUTOMATED ANEU 6.5 6.4 7.9 ABL 1.7 1.7 3.2 ABM 0.8 0.8 1.0 CITLALY 0.0 0.0 0.0 ABB 0.0 0.0 0.0 AIG 0.0 0.1 0.1 Recent Labs 02/01/21 
2530 01/31/21 
0356 01/30/21 1929  138 135* K 3.4* 3.1* 3.6 * 108* 101 CO2 21 19* 22 AGAP 9 11 12 GLU 88 114* 114* BUN 6* 11 13 CREA 0.59* 0.69 0.96 GFRAA >60 >60 >60 GFRNA >60 >60 >60  
CA 9.1 9.0 10.4 * 194* 266* TP 5.9* 6.2* 8.1 ALB 2.2* 2.3* 3.0*  
GLOB 3.7* 3.9* 5.1* AGRAT 0.6* 0.6* 0.6* MG 1.6* 1.8  --   
 
 
 
Imaging: 
 
Medications: 
Current Facility-Administered Medications Medication Dose Route Frequency  potassium chloride 20 mEq in 100 ml IVPB  20 mEq IntraVENous Q2H  
 ciprofloxacin (CIPRO) 400 mg in D5W IVPB (premix)  400 mg IntraVENous Q12H  
 diazePAM (VALIUM) tablet 5 mg  5 mg Oral Q12H PRN  
 fluticasone propionate (FLONASE) 50 mcg/actuation nasal spray 2 Spray  2 Spray Both Nostrils DAILY  budesonide-formoteroL (SYMBICORT) 160-4.5 mcg/actuation HFA inhaler 2 Puff  2 Puff Inhalation BID RT  
 hydroCHLOROthiazide (HYDRODIURIL) tablet 25 mg  25 mg Oral DAILY  pravastatin (PRAVACHOL) tablet 40 mg  40 mg Oral DAILY  propranolol LA (INDERAL LA) capsule 120 mg  120 mg Oral DAILY  sucralfate (CARAFATE) tablet 1 g  1 g Oral QID  sodium chloride (NS) flush 5-40 mL  5-40 mL IntraVENous Q8H  
 sodium chloride (NS) flush 5-40 mL  5-40 mL IntraVENous PRN  
 acetaminophen (TYLENOL) tablet 650 mg  650 mg Oral Q6H PRN Or  
 acetaminophen (TYLENOL) suppository 650 mg  650 mg Rectal Q6H PRN  polyethylene glycol (MIRALAX) packet 17 g  17 g Oral DAILY PRN  promethazine (PHENERGAN) tablet 12.5 mg  12.5 mg Oral Q6H PRN  
 enoxaparin (LOVENOX) injection 40 mg  40 mg SubCUTAneous DAILY  0.9% sodium chloride infusion  150 mL/hr IntraVENous CONTINUOUS  
 morphine injection 2 mg  2 mg IntraVENous Q3H PRN  
 ondansetron (ZOFRAN) injection 4 mg  4 mg IntraVENous Q4H PRN  
 levothyroxine (SYNTHROID) tablet 88 mcg  88 mcg Oral ACB  FLUoxetine (PROzac) capsule 20 mg  20 mg Oral QHS  morphine CR (MS CONTIN) tablet 15 mg  15 mg Oral Q12H  pantoprazole (PROTONIX) tablet 40 mg  40 mg Oral ACB&D  
 lidocaine (XYLOCAINE) 2 % viscous solution 15 mL  15 mL Mouth/Throat TIDAC ASSESSMENT: 
 
Problem List  Date Reviewed: 10/29/2020 Codes Class Noted Abdominal pain ICD-10-CM: R10.9 ICD-9-CM: 789.00  1/31/2021 * (Principal) Hepatic lesion ICD-10-CM: K76.9 ICD-9-CM: 573.8  1/30/2021 Leukocytosis ICD-10-CM: O61.454 ICD-9-CM: 288.60  1/30/2021 UTI (urinary tract infection) ICD-10-CM: N39.0 ICD-9-CM: 599.0  1/30/2021 Pancreatic lesion ICD-10-CM: K86.9 ICD-9-CM: 577.9  1/30/2021 Elevated LFTs ICD-10-CM: R79.89 ICD-9-CM: 790.6  1/30/2021 Lung nodule ICD-10-CM: R91.1 ICD-9-CM: 793.11  9/23/2020 Dyspepsia ICD-10-CM: R10.13 ICD-9-CM: 536.8  2/6/2019 Cervicalgia ICD-10-CM: M54.2 ICD-9-CM: 723.1  2/6/2019 Seborrheic keratoses, inflamed ICD-10-CM: L82.0 ICD-9-CM: 702.11  8/14/2018 History of cigarette smoking ICD-10-CM: Z87.891 ICD-9-CM: V15.82  7/10/2018 Gastroesophageal reflux disease without esophagitis ICD-10-CM: K21.9 ICD-9-CM: 530.81  5/17/2018 Palpitations ICD-10-CM: R00.2 ICD-9-CM: 785.1  7/25/2017 Microscopic hematuria ICD-10-CM: R31.29 ICD-9-CM: 599.72  6/27/2017 Memory loss ICD-10-CM: R41.3 ICD-9-CM: 780.93  10/5/2016 Breast mass in female ICD-10-CM: N63.0 ICD-9-CM: 611.72  10/27/2015 Fatigue due to depression ICD-10-CM: F32.9, R53.83 ICD-9-CM: 781, 780.79  10/23/2015 Acquired hypothyroidism ICD-10-CM: E03.9 ICD-9-CM: 244.9  10/23/2015 Essential hypertension ICD-10-CM: I10 
ICD-9-CM: 401.9  Unknown Depression, unipolar ([address] ICD-10-CM: F33.9 ICD-9-CM: 296.20  Unknown Pure hypercholesterolemia ICD-10-CM: E78.00 ICD-9-CM: 272.0  Unknown Mrs Rand Porter presented to the ED due to worsening abdominal pain, nausea, and vomiting. She began having abdominal pain, decreased appetite and weight loss around Thanksgiving 2020. She reports ~20# weight loss. She has undergone workup at [facility] with abdominal US on 1/28 that revealed 1.8 cm lesion in pancreatic body and 8mm liver lesion of right lobe. It was recommended that Abd MRI be completed which revealed numerous liver lesions, pancreatic lesion, along with bone lesions in spine and pelvis. We were consulted for concern of new metastatic malignancy.     
  
 
PLAN: 
? Metastatic Pancreatic Cancer 
-check CEA, Ca 19-9, AFP, amylase, lipase 
-chest CT for staging 
-consult IR for biopsy 2/1 chest CT right hilar mass encasing right lower lobe bronchus 2.4 cm. Tumor markers pending. Adding chromogranin. IR planing biopsy tomorrow 
  
Anemia 
-check TSAT, ferritin, folate, B12, Vit D  
2/1 Tsat 17/ferritin 113 
  
Patient will discuss with  of where she would desire for future treatment, with us or with [facility]. This information will help us set up OP appointments appropriately.   
  
Goals and plan of care reviewed with the patient. All questions answered to the best of our ability. Randy Jaramillo NP [facility] Hematology & Oncology [id number] [address] Office : [phone] Fax : [phone]

## 2021-02-02 NOTE — PROGRESS NOTES
Hospitalist Note Admit Date:  2021  7:22 PM  
Name:  Roberto Nix Age:  76 y.o. 
:  1955 MRN:  [id number] PCP:  Marino Landau., MD 
Treatment Team: Attending Provider: Saloni Zamora MD; Consulting Provider: Ridge Peters MD; Utilization Review: Jennifer Ortiz RN; : Maribel Abraham; Utilization Review: Lavon Birmingham RN 
 
HPI/Subjective:  
 
Mrs. Juanita Mcghee is a 76-year-old female with recent diagnosis of metastatic disease admitted with worsening abdominal pain. Patient has undergone work-up at [facility] with abdominal ultrasound on  revealed 1.8 cm lesion in the pancreatic body and 8 mm liver lesion of the right lobe. MRI  shows hepatic and bony metastasis likely from pancreatic primary. CA 19-99 elevated. Chest CT for staging is a right hilar mass which is consistent with primary lung cancer and notes that the pancreatic mass seen on the MRI is likely a fatty change instead of the tumor. 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 Patient is status post liver biopsy on  by IR and has tolerated the procedure well. She is started on long-acting pain control with MS Contin and IV morphine as needed for breakthrough pain on  with improvement in pain. : Patient is status post liver biopsy today by IR and tolerated the procedure well. Reports pain is better controlled. Denies chest pain, shortness of breath, nausea, vomiting. 10 systems reviewed and negative except as noted in HPI. Past Medical History:  
Diagnosis Date  Anxiety state, unspecified  Dyslipidemia   
 HTN (hypertension)  Major depressive disorder, recurrent episode, in partial or unspecified remission  Tobacco abuse  Vertigo Past Surgical History:  
Procedure Laterality Date [place]  HX COLONOSCOPY  ,  [address] [address] [address] [facility] ABDOMINAL HYSTERECTOMY  [phone] Allergies Allergen Reactions  Sulfa (Sulfonamide Antibiotics) Anaphylaxis  Buspar [Buspirone] Vertigo  Pcn [Penicillins] Hives  Wellbutrin [Bupropion Hcl] Other (comments) Constipation Social History Tobacco Use  Smoking status: Current Every Day Smoker Packs/day: 1.00 Years: 48.00 Pack years: 48.00  Smokeless tobacco: Never Used Substance Use Topics  Alcohol use: Yes Comment: rare Family History Problem Relation Age of Onset  Coronary Artery Disease Father  Elevated Lipids Father  Hypertension Father  Heart Attack Father  Heart Disease Father  Bipolar Disorder Mother  Cancer Mother Leukemia/gastric  Alzheimer Maternal Grandmother  Heart Disease Maternal Grandfather  Cancer Maternal Grandfather  Breast Cancer Neg Hx Family history reviewed and noncontributory. Immunization History Administered Date(s) Administered  Influenza High Dose Vaccine PF 10/29/2019, 09/01/2020  Influenza Vaccine 09/01/2020  Influenza Vaccine (Quad) Mdck Pf (>4 Yrs Flucelvax QUAD [id number]) 11/07/2017  Influenza Vaccine [employer]) PF (>6 Mo Flulaval, Fluarix, and >3 Yrs [address] [place] [address] Street, Fluzone [address]) 10/23/2015, 10/05/2016, 10/18/2018  Influenza, Quadrivalent, Adjuvanted (>65 Yrs FLUAD QUAD [id number]) 09/01/2020  Pneumococcal Conjugate (PCV-13) 07/18/2019  Pneumococcal Polysaccharide (PPSV-23) 10/23/2015  Tdap 06/27/2017 PTA Medications: 
Prior to Admission Medications Prescriptions Last Dose Informant Patient Reported? Taking? CYANOCOBALAMIN, VITAMIN B-12, (VITAMIN B-12 PO) 1/24/2021 at Unknown time  Yes Yes Sig: Take  by mouth. FLUoxetine (PROZAC) 20 mg capsule 1/31/2021 at Unknown time  No Yes Sig: Take 3 Caps by mouth nightly. diazePAM (VALIUM) 5 mg tablet 2021 at Unknown time  No Yes Sig: Take 1 Tab by mouth every twelve (12) hours as needed. Max Daily Amount: 10 mg.  
ergocalciferol (DRISDOL) 50,000 unit capsule 2021 at Unknown time  No Yes Sig: Take 1 Cap by mouth every seven (7) days. fluticasone (FLONASE) 50 mcg/actuation nasal spray Not Taking at Unknown time  No No  
Si sprays each nostril QD  
fluticasone-salmeterol (ADVAIR) 250-50 mcg/dose diskus inhaler Not Taking at Unknown time  No No  
Sig: Take 1 Puff by inhalation two (2) times a day. hydroCHLOROthiazide (HYDRODIURIL) 25 mg tablet 2021 at Unknown time  No Yes Sig: Take 1 Tab by mouth daily. ibuprofen (MOTRIN) 800 mg tablet 2021 at Unknown time  No Yes Sig: Take 1 Tab by mouth every eight (8) hours as needed for Pain.  
levothyroxine (SYNTHROID) 100 mcg tablet 2021 at Unknown time  No Yes Sig: Take 1 Tab by mouth Daily (before breakfast). Patient taking differently: Take 88 mcg by mouth Daily (before breakfast). magnesium oxide 500 mg tab Not Taking at Unknown time  Yes No  
Sig: Take  by mouth. omeprazole (PRILOSEC) 40 mg capsule 2021 at Unknown time  No Yes Sig: Take 1 Cap by mouth daily. pravastatin (PRAVACHOL) 40 mg tablet Not Taking at Unknown time  No No  
Sig: Take 1 Tab by mouth daily. propranolol LA (INDERAL LA) 120 mg SR capsule Unknown at Unknown time  No No  
Sig: Take 1 Cap by mouth daily. sucralfate (CARAFATE) 1 gram tablet 2021 at Unknown time  No Yes Sig: Take 1 Tab by mouth four (4) times daily. varenicline (CHANTIX STARTER ALIN) 0.5 mg (11)- 1 mg (42) DsPk Not Taking at Unknown time  No No  
Sig: Take 0.5 mg by mouth two (2) times daily (after meals). Facility-Administered Medications: None Objective:  
 
Patient Vitals for the past 24 hrs: 
 Temp Pulse Resp BP SpO2  
21 1226 97.7 °F (36.5 °C) 76 16 113/65 98 % 21 0521 98 °F (36.7 °C) 81 18 (!) 150/72 97 % 02/01/21 2120 98.1 °F (36.7 °C) 80 18 135/63 97 % 02/01/21 1906 98 °F (36.7 °C) 76 18 125/67 97 % 02/01/21 1529 97.7 °F (36.5 °C) 75 18 109/65 97 % 02/01/21 1328 97.4 °F (36.3 °C)     Oxygen Therapy O2 Sat (%): 98 % (02/02/21 1226) Pulse via Oximetry: 77 beats per minute (02/01/21 0847) O2 Device: Room air (02/02/21 0521) Estimated body mass index is 25.34 kg/m² as calculated from the following: 
  Height as of this encounter: 4' 11\" (1.499 m). Weight as of 1/31/21: 56.9 kg (125 lb 7.1 oz). Intake/Output Summary (Last 24 hours) at 2/2/2021 1300 Last data filed at 2/1/2021 1906 Gross per 24 hour Intake 2479 ml Output  Net 2479 ml  
   
*Note that automatically entered I/Os may not be accurate; dependent on patient compliance with collection and accurate  by assistants. Physical Exam: 
General:    Well nourished. Alert. Eyes:   Normal sclerae. Extraocular movements intact. HENT:  Normocephalic, atraumatic. Moist mucous membranes CV:   RRR. No m/r/g. Lungs:  CTAB. No wheezing, rhonchi, or rales. Abdomen: Soft, nontender, nondistended. Extremities: Warm and dry. No cyanosis or edema. Neurologic: CN II-XII grossly intact. Sensation intact. Skin:     No rashes or jaundice. Normal coloration Psych:  Normal mood and affect. I reviewed the labs, imaging, EKGs, telemetry, and other studies done this admission. Data Reviewed:  
Recent Results (from the past 24 hour(s)) CBC WITH AUTOMATED DIFF Collection Time: 02/02/21  5:44 AM  
Result Value Ref Range WBC 10.0 4.3 - 11.1 K/uL  
 RBC 4.12 4.05 - 5.2 M/uL  
 HGB 10.5 (L) 11.7 - 15.4 g/dL HCT 32.7 (L) 35.8 - 46.3 % MCV 79.4 (L) 79.6 - 97.8 FL  
 MCH 25.5 (L) 26.1 - 32.9 PG  
 MCHC 32.1 31.4 - 35.0 g/dL  
 RDW 14.2 11.9 - 14.6 % PLATELET 692 (H) 946 - 450 K/uL MPV 9.6 9.4 - 12.3 FL ABSOLUTE NRBC 0.00 0.0 - 0.2 K/uL  
 DF AUTOMATED NEUTROPHILS 71 43 - 78 % LYMPHOCYTES 19 13 - 44 % MONOCYTES 9 4.0 - 12.0 % EOSINOPHILS 1 0.5 - 7.8 % BASOPHILS 0 0.0 - 2.0 % IMMATURE GRANULOCYTES 1 0.0 - 5.0 %  
 ABS. NEUTROPHILS 7.0 1.7 - 8.2 K/UL  
 ABS. LYMPHOCYTES 1.9 0.5 - 4.6 K/UL  
 ABS. MONOCYTES 0.9 0.1 - 1.3 K/UL  
 ABS. EOSINOPHILS 0.1 0.0 - 0.8 K/UL  
 ABS. BASOPHILS 0.0 0.0 - 0.2 K/UL  
 ABS. IMM. GRANS. 0.1 0.0 - 0.5 K/UL METABOLIC PANEL, COMPREHENSIVE Collection Time: 02/02/21  5:44 AM  
Result Value Ref Range Sodium 139 136 - 145 mmol/L Potassium 3.5 3.5 - 5.1 mmol/L Chloride 109 (H) 98 - 107 mmol/L  
 CO2 21 21 - 32 mmol/L Anion gap 9 7 - 16 mmol/L Glucose 121 (H) 65 - 100 mg/dL BUN 6 (L) 8 - 23 MG/DL Creatinine 0.80 0.6 - 1.0 MG/DL  
 GFR est AA >60 >60 ml/min/1.73m2 GFR est non-AA >60 >60 ml/min/1.73m2 Calcium 9.5 8.3 - 10.4 MG/DL Bilirubin, total 0.3 0.2 - 1.1 MG/DL  
 ALT (SGPT) 37 12 - 65 U/L  
 AST (SGOT) 31 15 - 37 U/L Alk. phosphatase 207 (H) 50 - 130 U/L Protein, total 6.4 6.3 - 8.2 g/dL Albumin 2.3 (L) 3.2 - 4.6 g/dL Globulin 4.1 (H) 2.3 - 3.5 g/dL A-G Ratio 0.6 (L) 1.2 - 3.5 MAGNESIUM Collection Time: 02/02/21  5:44 AM  
Result Value Ref Range Magnesium 1.6 (L) 1.8 - 2.4 mg/dL All Micro Results Procedure Component Value Units Date/Time CULTURE, BLOOD [[id number]] Collected: 01/30/21 1929 Order Status: Completed Specimen: Blood Updated: 02/02/21 0741 Special Requests: --     
  LEFT Antecubital 
  
  Culture result: NO GROWTH 3 DAYS     
 CULTURE, BLOOD [[id number]] Collected: 01/30/21 1930 Order Status: Completed Specimen: Blood Updated: 02/02/21 0741 Special Requests: --     
  RIGHT Antecubital 
  
  Culture result: NO GROWTH 3 DAYS     
 COVID-19 RAPID TEST [[id number]] Collected: 01/30/21 2314 Order Status: Completed Specimen: Nasopharyngeal Updated: 01/31/21 0009 Specimen source Please find results under separate order COVID-19 rapid test Not detected Comment:     
The specimen is NEGATIVE for SARS-CoV-2, the novel coronavirus associated with COVID-19. A negative result does not rule out COVID-19. This test has been authorized by the FDA under an Emergency Use Authorization (EUA) for use by authorized laboratories. Fact sheet for Healthcare Providers: ConventionUpdate.co.nz Fact sheet for Patients: ConventionLoad DynamiXdate.co.nz Methodology: Isothermal Nucleic Acid Amplification Current Facility-Administered Medications Medication Dose Route Frequency  ciprofloxacin (CIPRO) 400 mg in D5W IVPB (premix)  400 mg IntraVENous Q12H  
 diazePAM (VALIUM) tablet 5 mg  5 mg Oral Q12H PRN  
 fluticasone propionate (FLONASE) 50 mcg/actuation nasal spray 2 Spray  2 Spray Both Nostrils DAILY  budesonide-formoteroL (SYMBICORT) 160-4.5 mcg/actuation HFA inhaler 2 Puff  2 Puff Inhalation BID RT  
 hydroCHLOROthiazide (HYDRODIURIL) tablet 25 mg  25 mg Oral DAILY  pravastatin (PRAVACHOL) tablet 40 mg  40 mg Oral DAILY  propranolol LA (INDERAL LA) capsule 120 mg  120 mg Oral DAILY  sucralfate (CARAFATE) tablet 1 g  1 g Oral QID  sodium chloride (NS) flush 5-40 mL  5-40 mL IntraVENous Q8H  
 sodium chloride (NS) flush 5-40 mL  5-40 mL IntraVENous PRN  
 acetaminophen (TYLENOL) tablet 650 mg  650 mg Oral Q6H PRN Or  
 acetaminophen (TYLENOL) suppository 650 mg  650 mg Rectal Q6H PRN  polyethylene glycol (MIRALAX) packet 17 g  17 g Oral DAILY PRN  promethazine (PHENERGAN) tablet 12.5 mg  12.5 mg Oral Q6H PRN  
 enoxaparin (LOVENOX) injection 40 mg  40 mg SubCUTAneous DAILY  0.9% sodium chloride infusion  150 mL/hr IntraVENous CONTINUOUS  
 morphine injection 2 mg  2 mg IntraVENous Q3H PRN  
 ondansetron (ZOFRAN) injection 4 mg  4 mg IntraVENous Q4H PRN  
 levothyroxine (SYNTHROID) tablet 88 mcg  88 mcg Oral ACB  FLUoxetine (PROzac) capsule 20 mg  20 mg Oral QHS  morphine CR (MS CONTIN) tablet 15 mg  15 mg Oral Q12H  pantoprazole (PROTONIX) tablet 40 mg  40 mg Oral ACB&D  
 lidocaine (XYLOCAINE) 2 % viscous solution 15 mL  15 mL Mouth/Throat TIDAC Facility-Administered Medications Ordered in Other Encounters Medication Dose Route Frequency  0.9% sodium chloride infusion  25 mL/hr IntraVENous CONTINUOUS  
 fentaNYL citrate (PF) injection 25-50 mcg  25-50 mcg IntraVENous Multiple  midazolam (VERSED) injection 0.5-2 mg  0.5-2 mg IntraVENous Multiple Other Studies: No results found for this visit on 01/30/21. No results found. SARS-CoV-2 Lab Results \"Novel Coronavirus\" Test: No results found for: COV2NT \"Emergent Disease\" Test: No results found for: EDPR \"SARS-COV-2\" Test: No results found for: XGCOVT Rapid Test:  
Lab Results Component Value Date/Time COVR Not detected 01/30/2021 11:14 PM  
 
  
 
 
 
Assessment and Plan:  
 
Hospital Problems as of 2/2/2021 Date Reviewed: 10/29/2020 Codes Class Noted - Resolved POA Abdominal pain ICD-10-CM: R10.9 ICD-9-CM: 789.00  1/31/2021 - Present Unknown * (Principal) Hepatic lesion ICD-10-CM: K76.9 ICD-9-CM: 573.8  1/30/2021 - Present Yes Leukocytosis ICD-10-CM: R78.168 ICD-9-CM: 288.60  1/30/2021 - Present Yes  
   
 UTI (urinary tract infection) ICD-10-CM: N39.0 ICD-9-CM: 599.0  1/30/2021 - Present Yes Pancreatic lesion ICD-10-CM: K86.9 ICD-9-CM: 577.9  1/30/2021 - Present Yes Elevated LFTs ICD-10-CM: R79.89 ICD-9-CM: 790.6  1/30/2021 - Present Yes Acquired hypothyroidism ICD-10-CM: E03.9 ICD-9-CM: 244.9  10/23/2015 - Present Yes Plan: 
#Abdominal pain 
likely secondary to pancreatic and hepatic masses Also MRI reveals bony metastases which could be a contributing factor Continue long-acting pain control with MS Contin IV morphine as needed breakthrough pain Plan to continue MS Contin on discharge with short acting pain relievers for breakthrough pain 
  
#Metastatic malignancy MRI shows hepatic and bony metastases likely from pancreatic primary Appreciate input from oncology service Ca 19-9 elevated Chest CT for staging shows a right hilar mass which is consistent with primary lung cancer and notes that the pancreatic mass seen on the MRI is likely a fatty change instead of the tumor Patient is status post liver biopsy on 2/2, follow-up on the results 
  
#Anemia Stable Continue to monitor #Urinary tract infection Continue IV antibiotics, plan to convert to oral Cipro on discharge 
  #Nausea/decreased p.o. intake /protein calorie malnutrition Appreciate dietary input. Patient is actually feeling better and has started eating Continue PPI and Carafate and viscous lidocaine until discharge at which point patient will continue PPI and Carafate only 
  #Hypokalemia Repleted Continue to monitor 
  #Transaminitis Likely secondary to hepatic metastases as above Continue to monitor Discharge planning: To be determined DVT ppx ordered Code status:  Full Estimated LOS:  Greater than 2 midnights Risk:  high Signed: 
Loretta Osorio MD

## 2021-02-02 NOTE — PROCEDURES
Department of Interventional Radiology  [phone]        Interventional Radiology Brief Procedure Note    Patient: Claudene Marshall MRN: [id number]  SSN: [id number]    YOB: 1955  Age: 76 y.o. Sex: female      Date of Procedure: 2/2/2021    Pre-Procedure Diagnosis: Liver lesion    Post-Procedure Diagnosis: SAME    Procedure(s): Image Guided Biopsy    Brief Description of Procedure: as above    Performed By: Anthony Nix MD     Assistants: None    Anesthesia:Moderate Sedation    Estimated Blood Loss: Less than 10ml    Specimens:  Pathology    Implants:  None    Findings: left lobe lesion.   Lesions better seen on MRI    Complications: None    Recommendations: routine post care     Follow Up: as needed    Signed By: Anthony Nix MD     February 2, 2021

## 2021-02-02 NOTE — PROGRESS NOTES
TRANSFER - OUT REPORT:    Verbal report given to Beck Lan on Katy Benitez  being transferred to IR prep room 5 for routine post - op       Report consisted of patients Situation, Background, Assessment and   Recommendations(SBAR). Information from the following report(s) SBAR, Kardex, Procedure Summary and MAR was reviewed with the receiving nurse. Lines:   Peripheral IV 02/01/21 Posterior;Right Hand (Active)   Site Assessment Clean, dry, & intact 02/02/21 0324   Phlebitis Assessment 0 02/02/21 0324   Infiltration Assessment 0 02/02/21 0324   Dressing Status Clean, dry, & intact 02/02/21 0324   Dressing Type Transparent;Tape 02/02/21 0324   Hub Color/Line Status Patent; Flushed 02/02/21 0324   Alcohol Cap Used No 02/02/21 0324        Opportunity for questions and clarification was provided. Watch site for bleeding. DC at 10:30 if no evidence of bleeding.

## 2021-02-02 NOTE — PROGRESS NOTES
[facility] Inpatient Hematology / Oncology Progress Note Admission Date: 2021  7:22 PM 
Reason for Admission/Hospital Course: Hepatic lesion [K76.9] Abdominal pain [R10.9] 24 Hour Events: 
IR today for liver bx 
CA 19-9 elevated Feeling well, tolerated procedure without difficulty No complaints at present ROS: 
Constitutional: negative for fever, chills. +weakness, malaise, fatigue. CV: negative for chest pain, palpitations, edema. Respiratory:  negative for dyspnea, cough, wheezing. GI: positive for nausea, decreased appetite, abdominal tenderness 10 point review of systems is otherwise negative with the exception of the elements mentioned above in the HPI. Allergies Allergen Reactions  Sulfa (Sulfonamide Antibiotics) Anaphylaxis  Buspar [Buspirone] Vertigo  Pcn [Penicillins] Hives  Wellbutrin [Bupropion Hcl] Other (comments) Constipation OBJECTIVE: 
Patient Vitals for the past 8 hrs: 
 BP Temp Pulse Resp SpO2  
21 0521 (!) 150/72 98 °F (36.7 °C) 81 18 97 % Temp (24hrs), Av.8 °F (36.6 °C), Min:97.4 °F (36.3 °C), Max:98.1 °F (36.7 °C) No intake/output data recorded. Physical Exam: 
Constitutional: Well developed, well nourished female in no acute distress, sitting comfortably in the hospital bed. HEENT: Normocephalic and atraumatic. Oropharynx is clear, mucous membranes are moist.  Pupils are equal, round, and reactive to light. Extraocular muscles are intact. Sclerae anicteric. Skin Warm and dry. No bruising and no rash noted. No erythema. No pallor. Respiratory Lungs are clear to auscultation bilaterally without wheezes, rales or rhonchi, normal air exchange without accessory muscle use. CVS Normal rate, regular rhythm and normal S1 and S2. No murmurs, gallops, or rubs. Abdomen Soft,  nondistended, tender mid to left LQ, normoactive bowel sounds. Neuro Grossly nonfocal with no obvious sensory or motor deficits. MSK Normal range of motion in general.  No edema and no tenderness. Psych Appropriate mood and affect. Labs: 
   
Recent Labs 02/02/21 
0649 02/01/21 
8997 01/31/21 
0870 WBC 10.0 9.1 8.9  
RBC 4.12 3.79* 3.71* HGB 10.5* 9.8* 9.7* HCT 32.7* 30.5* 28.9*  
MCV 79.4* 80.5 77.9*  
MCH 25.5* 25.9* 26.1 MCHC 32.1 32.1 33.6 RDW 14.2 14.5 13.8 * 488* 479* GRANS 71 72 72 LYMPH 19 18 19 MONOS 9 9 9 EOS 1 0* 0*  
BASOS 0 0 0 IG 1 0 1 DF AUTOMATED AUTOMATED AUTOMATED ANEU 7.0 6.5 6.4 ABL 1.9 1.7 1.7 ABM 0.9 0.8 0.8 CITLALY 0.1 0.0 0.0 ABB 0.0 0.0 0.0 AIG 0.1 0.0 0.1 Recent Labs 02/02/21 
6058 02/01/21 
1132 02/01/21 
5619 01/31/21 
2579  137 138 138  
K 3.5 3.4* 3.4* 3.1*  
* 108* 108* 108* CO2 21 22 21 19* AGAP 9 7 9 11 * 97 88 114* BUN 6* 6* 6* 11  
CREA 0.80 0.59* 0.59* 0.69 GFRAA >60 >60 >60 >60 GFRNA >60 >60 >60 >60  
CA 9.5 9.1 9.1 9.0 *  --  178* 194* TP 6.4  --  5.9* 6.2* ALB 2.3*  --  2.2* 2.3*  
GLOB 4.1*  --  3.7* 3.9* AGRAT 0.6*  --  0.6* 0.6* MG 1.6* 1.7* 1.6* 1.8 Imaging: 
 
Medications: 
Current Facility-Administered Medications Medication Dose Route Frequency  ciprofloxacin (CIPRO) 400 mg in D5W IVPB (premix)  400 mg IntraVENous Q12H  
 diazePAM (VALIUM) tablet 5 mg  5 mg Oral Q12H PRN  
 fluticasone propionate (FLONASE) 50 mcg/actuation nasal spray 2 Spray  2 Spray Both Nostrils DAILY  budesonide-formoteroL (SYMBICORT) 160-4.5 mcg/actuation HFA inhaler 2 Puff  2 Puff Inhalation BID RT  
 hydroCHLOROthiazide (HYDRODIURIL) tablet 25 mg  25 mg Oral DAILY  pravastatin (PRAVACHOL) tablet 40 mg  40 mg Oral DAILY  propranolol LA (INDERAL LA) capsule 120 mg  120 mg Oral DAILY  sucralfate (CARAFATE) tablet 1 g  1 g Oral QID  sodium chloride (NS) flush 5-40 mL  5-40 mL IntraVENous Q8H  
  sodium chloride (NS) flush 5-40 mL  5-40 mL IntraVENous PRN  
 acetaminophen (TYLENOL) tablet 650 mg  650 mg Oral Q6H PRN Or  
 acetaminophen (TYLENOL) suppository 650 mg  650 mg Rectal Q6H PRN  polyethylene glycol (MIRALAX) packet 17 g  17 g Oral DAILY PRN  promethazine (PHENERGAN) tablet 12.5 mg  12.5 mg Oral Q6H PRN  
 enoxaparin (LOVENOX) injection 40 mg  40 mg SubCUTAneous DAILY  0.9% sodium chloride infusion  150 mL/hr IntraVENous CONTINUOUS  
 morphine injection 2 mg  2 mg IntraVENous Q3H PRN  
 ondansetron (ZOFRAN) injection 4 mg  4 mg IntraVENous Q4H PRN  
 levothyroxine (SYNTHROID) tablet 88 mcg  88 mcg Oral ACB  FLUoxetine (PROzac) capsule 20 mg  20 mg Oral QHS  morphine CR (MS CONTIN) tablet 15 mg  15 mg Oral Q12H  pantoprazole (PROTONIX) tablet 40 mg  40 mg Oral ACB&D  
 lidocaine (XYLOCAINE) 2 % viscous solution 15 mL  15 mL Mouth/Throat TIDAC Facility-Administered Medications Ordered in Other Encounters Medication Dose Route Frequency  0.9% sodium chloride infusion  25 mL/hr IntraVENous CONTINUOUS  
 fentaNYL citrate (PF) injection 25-50 mcg  25-50 mcg IntraVENous Multiple  midazolam (VERSED) injection 0.5-2 mg  0.5-2 mg IntraVENous Multiple ASSESSMENT: 
 
Problem List  Date Reviewed: 10/29/2020 Codes Class Noted Abdominal pain ICD-10-CM: R10.9 ICD-9-CM: 789.00  1/31/2021 * (Principal) Hepatic lesion ICD-10-CM: K76.9 ICD-9-CM: 573.8  1/30/2021 Leukocytosis ICD-10-CM: Z72.426 ICD-9-CM: 288.60  1/30/2021 UTI (urinary tract infection) ICD-10-CM: N39.0 ICD-9-CM: 599.0  1/30/2021 Pancreatic lesion ICD-10-CM: K86.9 ICD-9-CM: 577.9  1/30/2021 Elevated LFTs ICD-10-CM: R79.89 ICD-9-CM: 790.6  1/30/2021 Lung nodule ICD-10-CM: R91.1 ICD-9-CM: 793.11  9/23/2020 Dyspepsia ICD-10-CM: R10.13 ICD-9-CM: 536.8  2/6/2019 Cervicalgia ICD-10-CM: M54.2 ICD-9-CM: 723.1  2/6/2019 Seborrheic keratoses, inflamed ICD-10-CM: L82.0 ICD-9-CM: 702.11  8/14/2018 History of cigarette smoking ICD-10-CM: Z87.891 ICD-9-CM: V15.82  7/10/2018 Gastroesophageal reflux disease without esophagitis ICD-10-CM: K21.9 ICD-9-CM: 530.81  5/17/2018 Palpitations ICD-10-CM: R00.2 ICD-9-CM: 785.1  7/25/2017 Microscopic hematuria ICD-10-CM: R31.29 ICD-9-CM: 599.72  6/27/2017 Memory loss ICD-10-CM: R41.3 ICD-9-CM: 780.93  10/5/2016 Breast mass in female ICD-10-CM: N63.0 ICD-9-CM: 611.72  10/27/2015 Fatigue due to depression ICD-10-CM: F32.9, R53.83 ICD-9-CM: 770, 780.79  10/23/2015 Acquired hypothyroidism ICD-10-CM: E03.9 ICD-9-CM: 244.9  10/23/2015 Essential hypertension ICD-10-CM: I10 
ICD-9-CM: 401.9  Unknown Depression, unipolar ([address] ICD-10-CM: F33.9 ICD-9-CM: 296.20  Unknown Pure hypercholesterolemia ICD-10-CM: E78.00 ICD-9-CM: 272.0  Unknown Mrs Ab Saeed presented to the ED due to worsening abdominal pain, nausea, and vomiting. She began having abdominal pain, decreased appetite and weight loss around Thanksgiving 2020. She reports ~20# weight loss. She has undergone workup at [facility] with abdominal US on 1/28 that revealed 1.8 cm lesion in pancreatic body and 8mm liver lesion of right lobe. It was recommended that Abd MRI be completed which revealed numerous liver lesions, pancreatic lesion, along with bone lesions in spine and pelvis. We were consulted for concern of new metastatic malignancy.     
  
 
PLAN: 
? Metastatic Pancreatic Cancer 
-check CEA, Ca 19-9, AFP, amylase, lipase 
-chest CT for staging 
-consult IR for biopsy 2/1 chest CT right hilar mass encasing right lower lobe bronchus 2.4 cm. Tumor markers pending. Adding chromogranin. IR planing biopsy tomorrow 2/2 To IR today for liver biopsy. Tolerated procedure well. CA 19-9 elevated at 2000. Will await liver biopsy for confirmation/diagnosis.   
  
 Anemia 
-check TSAT, ferritin, folate, B12, Vit D  
2/1 Tsat 17/ferritin 113 
2/2 Folate, B12 and Vit D adequate. Hgb stable at 10. 5. 
  
Patient will discuss with  of where she would desire for future treatment, with us or with [facility]. This information will help us set up OP appointments appropriately.   
  
Goals and plan of care reviewed with the patient. All questions answered to the best of our ability. COREY Riggins [facility] [address] [place] [address] Office : [phone] Fax : [phone]

## 2021-02-03 NOTE — PROGRESS NOTES
END OF SHIFT NOTE: 
Patient received magnesium and Potassium replacements as directed. Patient medicated once with Morphine 2 mg IV for break trough pain and  and Zofran 4 mg IV for nausea, no emesis produced. Patient complain of SOB - RT notified breathing treatment given. Intake/Output 02/03 0701 - 02/03 1900 In: 0602 [P.O.:120; I.V.:2231] Out: 600 [Urine:600] Voiding: Yes Catheter: No  
Drain:   
 
 
 
 
 
Stool:  No reports  occurrences. Stool Assessment Stool Color: Green (01/30/21 1950) Stool Appearance: Loose (01/31/21 0743) Stool Amount: Medium (01/30/21 1950) Stool Source/Status: Rectum (01/30/21 1950) Emesis:  No  occurrences. VITAL SIGNS Patient Vitals for the past 12 hrs: 
 Temp Pulse Resp BP SpO2  
02/03/21 1738     100 % 02/03/21 1730     100 % 02/03/21 1628  79   98 % 02/03/21 1548 98.2 °F (36.8 °C) 73 16 (!) 140/75 98 % 02/03/21 0730 97.9 °F (36.6 °C) 77 18 135/67 95 % Pain Assessment Pain 1 Pain Scale 1: Numeric (0 - 10) (02/03/21 1725) Pain Intensity 1: 6 (02/03/21 1725) Patient Stated Pain Goal: 0 (02/02/21 0610) Pain Reassessment 1: Yes (02/02/21 2238) Pain Location 1: Abdomen (02/03/21 1725) Pain Orientation 1: Mid (02/03/21 1725) Pain Description 1: Pressure; Throbbing (02/03/21 1725) Pain Intervention(s) 1: Medication (see MAR) (02/03/21 1725) Ambulating Yes in room - bathroom Additional Information:  See above Shift report given to oncoming nurse MOHSEN Allison  at the bedside.  
 
Gallo Noriega RN

## 2021-02-03 NOTE — PROGRESS NOTES
Hospitalist Note Admit Date:  2021  7:22 PM  
Name:  Billy Ashton Age:  76 y.o. 
:  1955 MRN:  [id number] PCP:  Dariela Hunt MD 
Treatment Team: Attending Provider: Yoko Lewis MD; Consulting Provider: Tom Keller MD; Utilization Review: Felicia Villavicencio RN; : Ivet Gann; Utilization Review: Kathy Wei RN 
 
HPI/Subjective:  
 
Mrs. Steffi Sandhoff is a 77-year-old female with recent diagnosis of metastatic disease admitted with worsening abdominal pain. Patient has undergone work-up at [facility] with abdominal ultrasound on  revealed 1.8 cm lesion in the pancreatic body and 8 mm liver lesion of the right lobe. MRI  shows hepatic and bony metastasis likely from pancreatic primary. CA 19-99 elevated. Chest CT for staging is a right hilar mass which is consistent with primary lung cancer and notes that the pancreatic mass seen on the MRI is likely a fatty change instead of the tumor. 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 Patient is status post liver biopsy on  by IR and has tolerated the procedure well. She is started on long-acting pain control with MS Contin and IV morphine as needed for breakthrough pain on  with improvement in pain. : Patient reports she is feeling better today. No active complaint. Denies chest pain, shortness of breath, nausea, vomiting. She states she is coping with the new diagnosis. 10 systems reviewed and negative except as noted in HPI. Past Medical History:  
Diagnosis Date  Anxiety state, unspecified  Dyslipidemia   
 HTN (hypertension)  Major depressive disorder, recurrent episode, in partial or unspecified remission  Tobacco abuse  Vertigo Past Surgical History:  
Procedure Laterality Date [facility]  HX COLONOSCOPY  ,  [address] [address] [address] [facility] ABDOMINAL HYSTERECTOMY  [phone] Allergies Allergen Reactions  Sulfa (Sulfonamide Antibiotics) Anaphylaxis  Buspar [Buspirone] Vertigo  Pcn [Penicillins] Hives  Wellbutrin [Bupropion Hcl] Other (comments) Constipation Social History Tobacco Use  Smoking status: Current Every Day Smoker Packs/day: 1.00 Years: 48.00 Pack years: 48.00  Smokeless tobacco: Never Used Substance Use Topics  Alcohol use: Yes Comment: rare Family History Problem Relation Age of Onset  Coronary Artery Disease Father  Elevated Lipids Father  Hypertension Father  Heart Attack Father  Heart Disease Father  Bipolar Disorder Mother  Cancer Mother Leukemia/gastric  Alzheimer Maternal Grandmother  Heart Disease Maternal Grandfather  Cancer Maternal Grandfather  Breast Cancer Neg Hx Family history reviewed and noncontributory. Immunization History Administered Date(s) Administered  Influenza High Dose Vaccine PF 10/29/2019, 09/01/2020  Influenza Vaccine 09/01/2020  Influenza Vaccine (Quad) Mdck Pf (>4 Yrs Flucelvax QUAD [id number]) 11/07/2017  Influenza Vaccine [employer]) PF (>6 Mo Flulaval, Fluarix, and >3 Yrs [address] [place] [address] Street, Fluzone [address]) 10/23/2015, 10/05/2016, 10/18/2018  Influenza, Quadrivalent, Adjuvanted (>65 Yrs FLUAD QUAD [id number]) 09/01/2020  Pneumococcal Conjugate (PCV-13) 07/18/2019  Pneumococcal Polysaccharide (PPSV-23) 10/23/2015  Tdap 06/27/2017 PTA Medications: 
Prior to Admission Medications Prescriptions Last Dose Informant Patient Reported? Taking? CYANOCOBALAMIN, VITAMIN B-12, (VITAMIN B-12 PO) 1/24/2021 at Unknown time  Yes Yes Sig: Take  by mouth. FLUoxetine (PROZAC) 20 mg capsule 1/31/2021 at Unknown time  No Yes Sig: Take 3 Caps by mouth nightly. diazePAM (VALIUM) 5 mg tablet 2021 at Unknown time  No Yes Sig: Take 1 Tab by mouth every twelve (12) hours as needed. Max Daily Amount: 10 mg.  
ergocalciferol (DRISDOL) 50,000 unit capsule 2021 at Unknown time  No Yes Sig: Take 1 Cap by mouth every seven (7) days. fluticasone (FLONASE) 50 mcg/actuation nasal spray Not Taking at Unknown time  No No  
Si sprays each nostril QD  
fluticasone-salmeterol (ADVAIR) 250-50 mcg/dose diskus inhaler Not Taking at Unknown time  No No  
Sig: Take 1 Puff by inhalation two (2) times a day. hydroCHLOROthiazide (HYDRODIURIL) 25 mg tablet 2021 at Unknown time  No Yes Sig: Take 1 Tab by mouth daily. ibuprofen (MOTRIN) 800 mg tablet 2021 at Unknown time  No Yes Sig: Take 1 Tab by mouth every eight (8) hours as needed for Pain.  
levothyroxine (SYNTHROID) 100 mcg tablet 2021 at Unknown time  No Yes Sig: Take 1 Tab by mouth Daily (before breakfast). Patient taking differently: Take 88 mcg by mouth Daily (before breakfast). magnesium oxide 500 mg tab Not Taking at Unknown time  Yes No  
Sig: Take  by mouth. omeprazole (PRILOSEC) 40 mg capsule 2021 at Unknown time  No Yes Sig: Take 1 Cap by mouth daily. pravastatin (PRAVACHOL) 40 mg tablet Not Taking at Unknown time  No No  
Sig: Take 1 Tab by mouth daily. propranolol LA (INDERAL LA) 120 mg SR capsule Unknown at Unknown time  No No  
Sig: Take 1 Cap by mouth daily. sucralfate (CARAFATE) 1 gram tablet 2021 at Unknown time  No Yes Sig: Take 1 Tab by mouth four (4) times daily. varenicline (CHANTIX STARTER ALIN) 0.5 mg (11)- 1 mg (42) DsPk Not Taking at Unknown time  No No  
Sig: Take 0.5 mg by mouth two (2) times daily (after meals). Facility-Administered Medications: None Objective:  
 
Patient Vitals for the past 24 hrs: 
 Temp Pulse Resp BP SpO2  
21 0730 97.9 °F (36.6 °C) 77 18 135/67 95 % 21 0330 97.9 °F (36.6 °C) 67 18 124/61 94 % 02/02/21 2306 98.4 °F (36.9 °C) 76 17 138/72 90 % 02/02/21 1945 98.1 °F (36.7 °C) 81 17 (!) 149/73 96 % 02/02/21 1457 98.4 °F (36.9 °C) 78 16 (!) 150/72 98 % Oxygen Therapy O2 Sat (%): 95 % (02/03/21 0730) Pulse via Oximetry: 77 beats per minute (02/01/21 0847) O2 Device: Room air (02/03/21 1202) Estimated body mass index is 25.34 kg/m² as calculated from the following: 
  Height as of this encounter: 4' 11\" (1.499 m). Weight as of 1/31/21: 56.9 kg (125 lb 7.1 oz). Intake/Output Summary (Last 24 hours) at 2/3/2021 1456 Last data filed at 2/3/2021 1159 Gross per 24 hour Intake 3300 ml Output 200 ml Net 3100 ml *Note that automatically entered I/Os may not be accurate; dependent on patient compliance with collection and accurate  by assistants. Physical Exam: 
General:    Well nourished. Alert. Eyes:   Normal sclerae. Extraocular movements intact. HENT:  Normocephalic, atraumatic. Moist mucous membranes CV:   RRR. No m/r/g. Lungs:  CTAB. No wheezing, rhonchi, or rales. Abdomen: Soft, nontender, nondistended. Extremities: Warm and dry. No cyanosis or edema. Neurologic: CN II-XII grossly intact. Sensation intact. Skin:     No rashes or jaundice. Normal coloration Psych:  Normal mood and affect. I reviewed the labs, imaging, EKGs, telemetry, and other studies done this admission. Data Reviewed:  
Recent Results (from the past 24 hour(s)) CBC WITH AUTOMATED DIFF Collection Time: 02/03/21  6:03 AM  
Result Value Ref Range WBC 7.1 4.3 - 11.1 K/uL  
 RBC 3.47 (L) 4.05 - 5.2 M/uL HGB 9.2 (L) 11.7 - 15.4 g/dL HCT 27.4 (L) 35.8 - 46.3 % MCV 79.0 (L) 79.6 - 97.8 FL  
 MCH 26.5 26.1 - 32.9 PG  
 MCHC 33.6 31.4 - 35.0 g/dL  
 RDW 14.4 11.9 - 14.6 % PLATELET 244 891 - 093 K/uL MPV 9.3 (L) 9.4 - 12.3 FL ABSOLUTE NRBC 0.00 0.0 - 0.2 K/uL  
 DF AUTOMATED NEUTROPHILS 60 43 - 78 % LYMPHOCYTES 27 13 - 44 % MONOCYTES 12 4.0 - 12.0 % EOSINOPHILS 1 0.5 - 7.8 % BASOPHILS 0 0.0 - 2.0 % IMMATURE GRANULOCYTES 0 0.0 - 5.0 %  
 ABS. NEUTROPHILS 4.2 1.7 - 8.2 K/UL  
 ABS. LYMPHOCYTES 1.9 0.5 - 4.6 K/UL  
 ABS. MONOCYTES 0.8 0.1 - 1.3 K/UL  
 ABS. EOSINOPHILS 0.1 0.0 - 0.8 K/UL  
 ABS. BASOPHILS 0.0 0.0 - 0.2 K/UL  
 ABS. IMM. GRANS. 0.0 0.0 - 0.5 K/UL METABOLIC PANEL, COMPREHENSIVE Collection Time: 02/03/21  6:03 AM  
Result Value Ref Range Sodium 138 136 - 145 mmol/L Potassium 2.9 (L) 3.5 - 5.1 mmol/L Chloride 107 98 - 107 mmol/L  
 CO2 23 21 - 32 mmol/L Anion gap 8 7 - 16 mmol/L Glucose 109 (H) 65 - 100 mg/dL BUN 4 (L) 8 - 23 MG/DL Creatinine 0.66 0.6 - 1.0 MG/DL  
 GFR est AA >60 >60 ml/min/1.73m2 GFR est non-AA >60 >60 ml/min/1.73m2 Calcium 8.9 8.3 - 10.4 MG/DL Bilirubin, total 0.4 0.2 - 1.1 MG/DL  
 ALT (SGPT) 27 12 - 65 U/L  
 AST (SGOT) 21 15 - 37 U/L Alk. phosphatase 168 (H) 50 - 136 U/L Protein, total 5.6 (L) 6.3 - 8.2 g/dL Albumin 2.0 (L) 3.2 - 4.6 g/dL Globulin 3.6 (H) 2.3 - 3.5 g/dL A-G Ratio 0.6 (L) 1.2 - 3.5 MAGNESIUM Collection Time: 02/03/21  6:03 AM  
Result Value Ref Range Magnesium 1.3 (L) 1.8 - 2.4 mg/dL All Micro Results Procedure Component Value Units Date/Time CULTURE, BLOOD [[id number]] Collected: 01/30/21 1929 Order Status: Completed Specimen: Blood Updated: 02/03/21 4875 Special Requests: --     
  LEFT Antecubital 
  
  Culture result: NO GROWTH 4 DAYS     
 CULTURE, BLOOD [[id number]] Collected: 01/30/21 1930 Order Status: Completed Specimen: Blood Updated: 02/03/21 6401 Special Requests: --     
  RIGHT Antecubital 
  
  Culture result: NO GROWTH 4 DAYS     
 COVID-19 RAPID TEST [[id number]] Collected: 01/30/21 2314 Order Status: Completed Specimen: Nasopharyngeal Updated: 01/31/21 0009 Specimen source Please find results under separate order COVID-19 rapid test Not detected Comment:     
The specimen is NEGATIVE for SARS-CoV-2, the novel coronavirus associated with COVID-19. A negative result does not rule out COVID-19. This test has been authorized by the FDA under an Emergency Use Authorization (EUA) for use by authorized laboratories. Fact sheet for Healthcare Providers: ConventionUpdate.co.nz Fact sheet for Patients: ConventionUpdate.co.nz Methodology: Isothermal Nucleic Acid Amplification Current Facility-Administered Medications Medication Dose Route Frequency  potassium chloride (K-DUR, KLOR-CON) SR tablet 40 mEq  40 mEq Oral BID  magnesium sulfate 2 g/50 ml IVPB (premix or compounded)  2 g IntraVENous BID  ciprofloxacin HCl (CIPRO) tablet 500 mg  500 mg Oral Q12H  
 diazePAM (VALIUM) tablet 5 mg  5 mg Oral Q12H PRN  
 fluticasone propionate (FLONASE) 50 mcg/actuation nasal spray 2 Spray  2 Spray Both Nostrils DAILY  budesonide-formoteroL (SYMBICORT) 160-4.5 mcg/actuation HFA inhaler 2 Puff  2 Puff Inhalation BID RT  
 hydroCHLOROthiazide (HYDRODIURIL) tablet 25 mg  25 mg Oral DAILY  pravastatin (PRAVACHOL) tablet 40 mg  40 mg Oral DAILY  propranolol LA (INDERAL LA) capsule 120 mg  120 mg Oral DAILY  sucralfate (CARAFATE) tablet 1 g  1 g Oral QID  sodium chloride (NS) flush 5-40 mL  5-40 mL IntraVENous Q8H  
 sodium chloride (NS) flush 5-40 mL  5-40 mL IntraVENous PRN  
 acetaminophen (TYLENOL) tablet 650 mg  650 mg Oral Q6H PRN Or  
 acetaminophen (TYLENOL) suppository 650 mg  650 mg Rectal Q6H PRN  polyethylene glycol (MIRALAX) packet 17 g  17 g Oral DAILY PRN  promethazine (PHENERGAN) tablet 12.5 mg  12.5 mg Oral Q6H PRN  
 enoxaparin (LOVENOX) injection 40 mg  40 mg SubCUTAneous DAILY  0.9% sodium chloride infusion  150 mL/hr IntraVENous CONTINUOUS  
 morphine injection 2 mg  2 mg IntraVENous Q3H PRN  
  ondansetron (ZOFRAN) injection 4 mg  4 mg IntraVENous Q4H PRN  
 levothyroxine (SYNTHROID) tablet 88 mcg  88 mcg Oral ACB  FLUoxetine (PROzac) capsule 20 mg  20 mg Oral QHS  morphine CR (MS CONTIN) tablet 15 mg  15 mg Oral Q12H  pantoprazole (PROTONIX) tablet 40 mg  40 mg Oral ACB&D  
 lidocaine (XYLOCAINE) 2 % viscous solution 15 mL  15 mL Mouth/Throat TIDAC Facility-Administered Medications Ordered in Other Encounters Medication Dose Route Frequency  0.9% sodium chloride infusion  25 mL/hr IntraVENous CONTINUOUS  
 fentaNYL citrate (PF) injection 25-50 mcg  25-50 mcg IntraVENous Multiple  midazolam (VERSED) injection 0.5-2 mg  0.5-2 mg IntraVENous Multiple Other Studies: No results found for this visit on 01/30/21. No results found. SARS-CoV-2 Lab Results \"Novel Coronavirus\" Test: No results found for: COV2NT \"Emergent Disease\" Test: No results found for: EDPR \"SARS-COV-2\" Test: No results found for: XGCOVT Rapid Test:  
Lab Results Component Value Date/Time COVR Not detected 01/30/2021 11:14 PM  
 
  
 
 
 
Assessment and Plan:  
 
Hospital Problems as of 2/3/2021 Date Reviewed: 10/29/2020 Codes Class Noted - Resolved POA Abdominal pain ICD-10-CM: R10.9 ICD-9-CM: 789.00  1/31/2021 - Present Unknown * (Principal) Hepatic lesion ICD-10-CM: K76.9 ICD-9-CM: 573.8  1/30/2021 - Present Yes Leukocytosis ICD-10-CM: W54.007 ICD-9-CM: 288.60  1/30/2021 - Present Yes  
   
 UTI (urinary tract infection) ICD-10-CM: N39.0 ICD-9-CM: 599.0  1/30/2021 - Present Yes Pancreatic lesion ICD-10-CM: K86.9 ICD-9-CM: 577.9  1/30/2021 - Present Yes Elevated LFTs ICD-10-CM: R79.89 ICD-9-CM: 790.6  1/30/2021 - Present Yes Acquired hypothyroidism ICD-10-CM: E03.9 ICD-9-CM: 244.9  10/23/2015 - Present Yes Plan: 
#Abdominal pain: Improved 
likely secondary to pancreatic and hepatic masses Also MRI reveals bony metastases which could be a contributing factor Continue long-acting pain control with MS Contin IV morphine as needed breakthrough pain Plan to continue MS Contin on discharge with short acting pain relievers for breakthrough pain 
  
#Metastatic malignancy MRI shows hepatic and bony metastases likely from pancreatic primary Appreciate input from oncology service Ca 19-9 elevated Chest CT for staging shows a right hilar mass which is consistent with primary lung cancer and notes that the pancreatic mass seen on the MRI is likely a fatty change instead of the tumor Patient is status post liver biopsy on 2/2, follow-up on the results #Severe hypokalemia and hypomagnesemia Repleted Continue to monitor in a.m. labs 
  
#Anemia Stable Continue to monitor #Urinary tract infection Continue Cipro p.o. 5 mg every 8 hours 
  #Nausea/decreased p.o. intake /protein calorie malnutrition Appreciate dietary input. Patient is actually feeling better and has started eating Continue PPI and Carafate and viscous lidocaine until discharge at which point patient will continue PPI and Carafate only #Transaminitis Likely secondary to hepatic metastases as above Continue to monitor Discharge planning: Tomorrow if stable. Plan to replete potassium and magnesium today DVT ppx ordered Code status:  Full Estimated LOS:  Greater than 2 midnights Risk:  high Signed: 
Janelle Jamil MD

## 2021-02-03 NOTE — PROGRESS NOTES
Liver biopsy from 2/3 pending results. Chart and labs reviewed. From oncology standpoint okay to discharge. We will arrange for follow up with Dr. Jose Daniel Thomas next week to discuss results, goals and plan of care.

## 2021-02-03 NOTE — PROGRESS NOTES
Assumed care and received report at [phone]. Patient resting comfortably in bed, not complaining or any pain or no signs of distress. Will continue to monitor.

## 2021-02-03 NOTE — PROGRESS NOTES
Problem: Falls - Risk of 
Goal: *Absence of Falls Outcome: Progressing Towards Goal 
Note: Fall Risk Interventions: 
Mobility Interventions: Communicate number of staff needed for ambulation/transfer, Patient to call before getting OOB, Strengthening exercises (ROM-active/passive) Medication Interventions: Evaluate medications/consider consulting pharmacy, Teach patient to arise slowly, Patient to call before getting OOB Elimination Interventions: Call light in reach, Patient to call for help with toileting needs, Toilet paper/wipes in reach, Toileting schedule/hourly rounds

## 2021-02-04 NOTE — PROGRESS NOTES
Patient discharge via front door of hospital via wheelchair accompany by this nurse, Jeffrey Mcleod , to family's vehicle for drive home. No distress noted. Patient'  present for drive home.

## 2021-02-04 NOTE — PROGRESS NOTES
Discharge instructions given to patient, follow - up appointments and prescriptions : Cipro , Morphine CR and Ultram. Patient states her  will be here to pick her up @ 1 pm.

## 2021-02-04 NOTE — DISCHARGE INSTRUCTIONS

## 2021-02-04 NOTE — DISCHARGE SUMMARY
Hospitalist Discharge Summary Admit Date:  2021  7:22 PM  
DC note date: 2021 Name:  Claudene Marshall Age:  76 y.o. 
:  1955 MRN:  [id number] PCP:  Catherine Vance MD 
Treatment Team: Attending Provider: Halima Martell MD; Consulting Provider: Katrina Black MD; Utilization Review: Rodolfo Tomlinson RN; : Bonifacio Cantrell; Utilization Review: Laura Tran RN 
 
Problem List for this Hospitalization: 
Hospital Problems as of 2021 Date Reviewed: 10/29/2020 Codes Class Noted - Resolved POA Abdominal pain ICD-10-CM: R10.9 ICD-9-CM: 789.00  2021 - Present Unknown * (Principal) Hepatic lesion ICD-10-CM: K76.9 ICD-9-CM: 573.8  2021 - Present Yes Leukocytosis ICD-10-CM: H90.501 ICD-9-CM: 288.60  2021 - Present Yes  
   
 UTI (urinary tract infection) ICD-10-CM: N39.0 ICD-9-CM: 599.0  2021 - Present Yes Pancreatic lesion ICD-10-CM: K86.9 ICD-9-CM: 577.9  2021 - Present Yes Elevated LFTs ICD-10-CM: R79.89 ICD-9-CM: 790.6  2021 - Present Yes Acquired hypothyroidism ICD-10-CM: E03.9 ICD-9-CM: 244.9  10/23/2015 - Present Yes Admission HPI from 2021:   
\" Claudene Marshall is a 76 y.o. female with a past medical history of recently discovered lesions of the pancreas, liver, and lung who presents to the ER with report of worsening abdominal pain for the past 2 - 3 months, becoming so severe today that she could not stand it. She also admits to nausea and diarrhea. Reports that she wants to vomit but can't. Had a CT scan at [facility] on 20 that was unremarkable. Had an abdominal US at [facility] on  that showed 1.8 cm lesion in pancreatic body. And questionable 8 mm lesion of R hepatic lobe. Pt reports that pain has improved at home with PO [place], but that it made her feel really weird. . \" Hospital Course: Patient is admitted with intractable abdominal pain, found to have metastatic disease. MRI showed hepatic and bony metastasis likely pancreatic.  CA 199 level elevated. Oncology consulted and followed patient. Some conflicting information as chest CT for staging showed a right hilar mass which is consistent with primary lung cancer and noted that the pancreatic mass seen on the MRI is likely a fatty change instead of the tumor. Oncology suspecting pancreatic ductal adenocarcinoma most likely based on imaging/serology. Patient is status post liver biopsy on 02/02 by IR. Pending results. Oncology is okay to receive results and plan of care as outpatient. Patient is started on long-acting pain control with MS Contin and IV morphine as needed for breakthrough pain, with improvement in pain control. Plan to continue MS Contin on discharge with short-acting pain reliever for breakthrough pain. Prescription given to the patient. Also patient found to have UTI and started on ciprofloxacin to complete 5-day course. Electrolyte abnormalities have been repleted as needed. Patient is stable to discharge home today with close follow-up with oncology as an outpatient for further management. Disposition: Home or Self Care Activity: Activity as tolerated Diet: DIET NUTRITIONAL SUPPLEMENTS All Meals; [employer] DIET REGULAR Code Status: Full Code Follow Up Orders: Follow-up Appointments Procedures  FOLLOW UP VISIT Appointment in: Other (Specify) Please make follow up appointment with Dr. Kip Woodson later next week. Please make follow up appointment with Dr. Kip Woodson later next week. Standing Status:   Standing Number of Occurrences:   1 Order Specific Question:   Appointment in Answer: Other (Specify) Follow-up Information Follow up With Specialties Details Why Contact Info Andreia Rhoades MD Hematology and Oncology, Hematology, Oncology On 2/22/2021 APPTTIME 9:30 AM [id number] [facility] [address] [place] [id number] 
[phone] Pepper Lobo MD Family Medicine   [address] [place] [address] 
[phone] Discharge meds at bottom of this note. Plan was discussed with patient and . All questions answered. Patient was stable at time of discharge. Given instructions to call a physician or return if any concerns. Discharge summary and encounter summary was sent to PCP electronically via \"Comm Mgt\" link in [facility], if possible. Diagnostic Imaging/Tests:  
Mri Abd W Wo Cont Result Date: 1/31/2021 MRI of the abdomen INDICATION: History of pancreas and liver lesions Standard MRI sequences were obtained through the abdomen in multiple planes. Images were obtained before and after intravenous infusion of  12 mL of Dotarem. Compared with abdomen CT dated 07/12/2017 and chest CT dated 10/23/2020. FINDINGS: There are numerous small low-attenuation lesions in the liver, largest approximately 6 mm. Portal vein is patent. There is no bile duct dilatation. There is a subtle area of low signal in the pancreas near the junction of body/tail, 13 mm diameter. Lesion is best seen on precontrast T1 sequences. There is no pancreatic duct distention. There is an 11 mm nodule either in or adjacent to the left adrenal gland. Right adrenal gland is unremarkable. There is normal enhancement of the kidneys. There is no hydronephrosis. There are multiple enhancing bone lesions in the spine and pelvis. There is also small mass or area of atelectasis in the right lung base. 1.  Numerous lesions in the liver and bones consistent with metastatic disease. 2.  Small indeterminate lesion in the mid pancreas, possible primary pancreas tumor. 3.  Mass versus atelectasis in the right lung base. Consider PET scan for further evaluation, attempt to determine primary tumor If there are any questions about this report, I can be reached on [employer] or at [phone] Ct Chest W Cont Result Date: 1/31/2021 CT of the Chest INDICATION: Metastatic disease, unknown primary Multiple axial images were obtained through the chest.  100mL of Isovue 370 intravenous contrast was used for better evaluation of solid organs and vascular structures. Radiation dose reduction techniques were used for this study. All CT scans performed at this facility use one or all of the following: Automated exposure control, adjustment of the mA and/or kVp according to patient's size, iterative reconstruction. COMPARISON: Earlier abdomen MRI and chest CT from 10/23/2020. FINDINGS: - LUNGS: Patient has developed a right hilar mass which is encasing the right lower lobe bronchus. Mass measures approximately 2.4 cm in diameter. Peripheral density in the posterior left lung base is likely atelectasis. There is minimal hazy groundglass density and septal thickening in the right lung, possibly due to vascular compromise. There are few small ill-defined nodules in the left lower lobe, indeterminate appearance. No pleural effusion. - MEDIASTINUM/AXILLA: Right hilar mass is extending directly into the subcarinal region. Subcarinal node measures 16 mm. No significant axillary adenopathy. - HEART/VESSELS: Normal. - CHEST WALL/BONES: The bone lesions noted on MRI are not well seen on the CT images. No fractures. - UPPER ABDOMEN: Small enhancing mass in the posterior right lobe of the liver. Most of the earlier seen liver lesions are not well visualized due to timing of contrast.  No discrete pancreas mass. Pancreas lesion seen on earlier MRI may simply represent an area of focal fatty change. Right hilar mass with subcarinal extension, likely primary lung cancer. Us Guide Bx Raquel Perc Result Date: 2/2/2021 History: Pancreatic lesion. Liver lesions, possibly metastasis Consent: Informed written and oral consent was obtained from the patient after explanation of benefits and risks (including, but not limited to: Infection, Hemorrhage, and Visceral injury) of procedure. The patient's questions were answered and requested that we proceed. Procedure:  Sterile barrier technique (including:  cap, mask, sterile gloves, sterile sheet, hand hygiene, and chlorhexidene for cutaneous antisepsis) were used. Following routine prep and drape of the upper abdomen, a local field block with 1% lidocaine was achieved. Using real-time ultrasound guidance, a 17 gauge needle was advanced into the superficial aspect of the left lobe lesion. Through this base needle, 3 18 gauge core biopsies were performed. The postprocedure ultrasound demonstrates no evidence of subcapsular hematoma. Complications: None. Medications:  Under physician supervision, 13 minutes of moderate intravenous conscious sedation was performed by administering Versed, Fentanyl intravenously. Continuous pulse oximetry, heart rate, and blood pressure monitoring was performed with an independent, trained observer present. Technically successful ultrasound guided left lobe liver mass biopsy. Specimen: Sent to cytopathology. Plan: The patient will recover for approximately 1 hour Xr Chest [facility] Result Date: 1/30/2021 EXAM: XR CHEST PORT INDICATION: sob COMPARISON: None. FINDINGS: A portable AP radiograph of the chest was obtained at 2032 hours. The patient is on a cardiac monitor. The lungs are clear. The cardiac and mediastinal contours and pulmonary vascularity are normal.  The bones and soft tissues are grossly within normal limits. Normal chest. 
 
 
Echocardiogram results: No results found for this visit on 01/30/21. Procedures done this admission: * No surgery found * All Micro Results Procedure Component Value Units Date/Time CULTURE, BLOOD [[id number]] Collected: 01/30/21 1929 Order Status: Completed Specimen: Blood Updated: 02/04/21 4018 Special Requests: --     
  LEFT Antecubital 
  
  Culture result: NO GROWTH 5 DAYS     
 CULTURE, BLOOD [[id number]] Collected: 01/30/21 1930 Order Status: Completed Specimen: Blood Updated: 02/04/21 8979 Special Requests: --     
  RIGHT Antecubital 
  
  Culture result: NO GROWTH 5 DAYS     
 COVID-19 RAPID TEST [[id number]] Collected: 01/30/21 2314 Order Status: Completed Specimen: Nasopharyngeal Updated: 01/31/21 0009 Specimen source Please find results under separate order COVID-19 rapid test Not detected Comment:     
The specimen is NEGATIVE for SARS-CoV-2, the novel coronavirus associated with COVID-19. A negative result does not rule out COVID-19. This test has been authorized by the FDA under an Emergency Use Authorization (EUA) for use by authorized laboratories. Fact sheet for Healthcare Providers: ConventionOpenROVdate.co.nz Fact sheet for Patients: ConventionOpenROVdate.co.nz Methodology: Isothermal Nucleic Acid Amplification SARS-CoV-2 Lab Results \"Novel Coronavirus\" Test: No results found for: COV2NT \"Emergent Disease\" Test: No results found for: EDPR \"SARS-COV-2\" Test: No results found for: XGCOVT Rapid Test:  
Lab Results Component Value Date/Time COVR Not detected 01/30/2021 11:14 PM  
 
  
 
 
Labs: Results:  
   
BMP, Mg, Phos Recent Labs 02/04/21 
0543 02/03/21 
6340 02/02/21 
7583  138 139  
K 3.8 2.9* 3.5  107 109* CO2 22 23 21 AGAP 8 8 9 BUN 4* 4* 6*  
CREA 0.64 0.66 0.80 CA 9.1 8.9 9.5 * 109* 121* MG 1.9 1.3* 1.6* CBC Recent Labs 02/04/21 
0543 02/03/21 
6751 02/02/21 
3956 WBC 9.3 7.1 10.0  
RBC 3.75* 3.47* 4.12  
HGB 9.7* 9.2* 10.5* HCT 29.6* 27.4* 32.7*  
* 419 586* GRANS 68 60 48 LYMPH 21 27 19 EOS 1 1 1 MONOS 10 12 9 BASOS 0 0 0 IG 1 0 1 ANEU 6.3 4.2 7.0 ABL 1.9 1.9 1.9 CITLALY 0.1 0.1 0.1 ABM 0.9 0.8 0.9 ABB 0.0 0.0 0.0 AIG 0.1 0.0 0.1 LFT Recent Labs 02/04/21 
0543 02/03/21 
5911 02/02/21 
6621 ALT 25 27 37 * 168* 207* TP 6.1* 5.6* 6.4 ALB 2.2* 2.0* 2.3*  
GLOB 3.9* 3.6* 4.1* AGRAT 0.6* 0.6* 0.6* Cardiac Testing No results found for: BNPP, BNP, CPK, RCK1, RCK2, RCK3, RCK4, CKMB, CKNDX, CKND1, TROPT, TROIQ Coagulation Tests No results found for: PTP, INR, APTT, INREXT A1c No results found for: HBA1C, HGBE8, [id number] Lipid Panel Lab Results Component Value Date/Time Cholesterol, total 223 (H) 06/26/2018 09:57 AM  
 HDL Cholesterol 47 06/26/2018 09:57 AM  
 LDL, calculated 135 (H) 06/26/2018 09:57 AM  
 VLDL, calculated 41 (H) 06/26/2018 09:57 AM  
 Triglyceride 203 (H) 06/26/2018 09:57 AM  
  
Thyroid Panel Lab Results Component Value Date/Time TSH 4.810 (H) 06/26/2018 09:57 AM  
 TSH 1.680 01/09/2018 02:48 PM  
 T4, Free 1.24 09/07/2016 02:36 PM  
    
Most Recent UA Lab Results Component Value Date/Time Color YELLOW 01/30/2021 10:04 PM  
 Appearance CLOUDY 01/30/2021 10:04 PM  
 Specific gravity 1.016 01/30/2021 10:04 PM  
 pH (UA) 7.0 01/30/2021 10:04 PM  
 Protein Negative 01/30/2021 10:04 PM  
 Glucose Negative 01/30/2021 10:04 PM  
 Ketone Negative 01/30/2021 10:04 PM  
 Bilirubin Negative 01/30/2021 10:04 PM  
 Blood Negative 01/30/2021 10:04 PM  
 Urobilinogen 0.2 01/30/2021 10:04 PM  
 Nitrites Negative 01/30/2021 10:04 PM  
 Leukocyte Esterase MODERATE (A) 01/30/2021 10:04 PM  
 WBC 20-50 01/30/2021 10:04 PM  
 RBC 3-5 01/30/2021 10:04 PM  
 Epithelial cells 0-3 01/30/2021 10:04 PM  
 Bacteria 2+ (H) 01/30/2021 10:04 PM  
  
 
Allergies Allergen Reactions  Sulfa (Sulfonamide Antibiotics) Anaphylaxis  Buspar [Buspirone] Vertigo  Pcn [Penicillins] Hives  Wellbutrin [Bupropion Hcl] Other (comments) Constipation Immunization History Administered Date(s) Administered  Influenza High Dose Vaccine PF 10/29/2019, 09/01/2020  Influenza Vaccine 09/01/2020  Influenza Vaccine (Quad) Mdck Pf (>4 Yrs Flucelvax QUAD [id number]) 11/07/2017  Influenza Vaccine [employer]) PF (>6 Mo Flulaval, Fluarix, and >[address] [place] [address], Fluzone [id number]) 10/23/2015, 10/05/2016, 10/18/2018  Influenza, Quadrivalent, Adjuvanted (>65 Yrs FLUAD QUAD [id number]) 09/01/2020  Pneumococcal Conjugate (PCV-13) 07/18/2019  Pneumococcal Polysaccharide (PPSV-23) 10/23/2015  Tdap 06/27/2017 All Labs from Last 24 Hrs: 
Recent Results (from the past 24 hour(s)) MAGNESIUM Collection Time: 02/04/21  5:43 AM  
Result Value Ref Range Magnesium 1.9 1.8 - 2.4 mg/dL METABOLIC PANEL, COMPREHENSIVE Collection Time: 02/04/21  5:43 AM  
Result Value Ref Range Sodium 137 136 - 145 mmol/L Potassium 3.8 3.5 - 5.1 mmol/L Chloride 107 98 - 107 mmol/L  
 CO2 22 21 - 32 mmol/L Anion gap 8 7 - 16 mmol/L Glucose 106 (H) 65 - 100 mg/dL BUN 4 (L) 8 - 23 MG/DL Creatinine 0.64 0.6 - 1.0 MG/DL  
 GFR est AA >60 >60 ml/min/1.73m2 GFR est non-AA >60 >60 ml/min/1.73m2 Calcium 9.1 8.3 - 10.4 MG/DL Bilirubin, total 0.3 0.2 - 1.1 MG/DL  
 ALT (SGPT) 25 12 - 65 U/L  
 AST (SGOT) 24 15 - 37 U/L Alk. phosphatase 189 (H) 50 - 136 U/L Protein, total 6.1 (L) 6.3 - 8.2 g/dL Albumin 2.2 (L) 3.2 - 4.6 g/dL Globulin 3.9 (H) 2.3 - 3.5 g/dL A-G Ratio 0.6 (L) 1.2 - 3.5    
CBC WITH AUTOMATED DIFF Collection Time: 02/04/21  5:43 AM  
Result Value Ref Range WBC 9.3 4.3 - 11.1 K/uL  
 RBC 3.75 (L) 4.05 - 5.2 M/uL HGB 9.7 (L) 11.7 - 15.4 g/dL HCT 29.6 (L) 35.8 - 46.3 % MCV 78.9 (L) 79.6 - 97.8 FL  
 MCH 25.9 (L) 26.1 - 32.9 PG  
 MCHC 32.8 31.4 - 35.0 g/dL  
 RDW 14.3 11.9 - 14.6 % PLATELET 183 (H) 563 - 450 K/uL MPV 9.3 (L) 9.4 - 12.3 FL ABSOLUTE NRBC 0.00 0.0 - 0.2 K/uL  
 DF AUTOMATED NEUTROPHILS 68 43 - 78 % LYMPHOCYTES 21 13 - 44 % MONOCYTES 10 4.0 - 12.0 % EOSINOPHILS 1 0.5 - 7.8 % BASOPHILS 0 0.0 - 2.0 % IMMATURE GRANULOCYTES 1 0.0 - 5.0 %  
 ABS. NEUTROPHILS 6.3 1.7 - 8.2 K/UL  
 ABS. LYMPHOCYTES 1.9 0.5 - 4.6 K/UL  
 ABS. MONOCYTES 0.9 0.1 - 1.3 K/UL  
 ABS. EOSINOPHILS 0.1 0.0 - 0.8 K/UL  
 ABS. BASOPHILS 0.0 0.0 - 0.2 K/UL  
 ABS. IMM. GRANS. 0.1 0.0 - 0.5 K/UL Discharge Exam: 
Patient Vitals for the past 24 hrs: 
 Temp Pulse Resp BP SpO2  
02/04/21 0922     98 % 02/04/21 0811 98.1 °F (36.7 °C) 77 16 (!) 146/77 94 % 02/04/21 0310 97.9 °F (36.6 °C) 78 17 (!) 172/81 96 % 02/03/21 2230 98.4 °F (36.9 °C) 77 18 135/64 97 % 02/03/21 1940 99 °F (37.2 °C) 81 18 (!) 155/71 97 % 02/03/21 1849 98 °F (36.7 °C) 74 16 (!) 157/64 100 % 02/03/21 1738     100 % 02/03/21 1730     100 % 02/03/21 1628  79   98 % 02/03/21 1548 98.2 °F (36.8 °C) 73 16 (!) 140/75 98 % Oxygen Therapy O2 Sat (%): 98 % (02/04/21 0922) Pulse via Oximetry: 73 beats per minute (02/04/21 0922) O2 Device: Room air (02/04/21 6263) Estimated body mass index is 25.34 kg/m² as calculated from the following: 
  Height as of this encounter: 4' 11\" (1.499 m). Weight as of 1/31/21: 56.9 kg (125 lb 7.1 oz). Intake/Output Summary (Last 24 hours) at 2/4/2021 2909 Last data filed at 2/3/2021 3593 Gross per 24 hour Intake 3323 ml Output 600 ml Net 2723 ml  
   
*Note that automatically entered I/Os may not be accurate; dependent on patient compliance with collection and accurate  by assistants. General:    Well nourished. Alert. Eyes:   Normal sclerae. Extraocular movements intact. ENT:  Normocephalic, atraumatic. Moist mucous membranes CV:   Regular rate and rhythm. No murmur, rub, or gallop. Lungs:  Clear to auscultation bilaterally. No wheezing, rhonchi, or rales. Abdomen: Soft, nontender, nondistended. Extremities: Warm and dry. No cyanosis or edema. Neurologic: CN II-XII grossly intact. No gross focal deficits Skin:     No rashes or jaundice. Psych:  Normal mood and affect. Current Med List in Hospital:  
Current Facility-Administered Medications Medication Dose Route Frequency  albuterol (PROVENTIL VENTOLIN) nebulizer solution 2.5 mg  2.5 mg Nebulization Q6H PRN  
 ciprofloxacin HCl (CIPRO) tablet 500 mg  500 mg Oral Q12H  
 diazePAM (VALIUM) tablet 5 mg  5 mg Oral Q12H PRN  
 fluticasone propionate (FLONASE) 50 mcg/actuation nasal spray 2 Spray  2 Spray Both Nostrils DAILY  budesonide-formoteroL (SYMBICORT) 160-4.5 mcg/actuation HFA inhaler 2 Puff  2 Puff Inhalation BID RT  
 hydroCHLOROthiazide (HYDRODIURIL) tablet 25 mg  25 mg Oral DAILY  pravastatin (PRAVACHOL) tablet 40 mg  40 mg Oral DAILY  propranolol LA (INDERAL LA) capsule 120 mg  120 mg Oral DAILY  sucralfate (CARAFATE) tablet 1 g  1 g Oral QID  sodium chloride (NS) flush 5-40 mL  5-40 mL IntraVENous Q8H  
 sodium chloride (NS) flush 5-40 mL  5-40 mL IntraVENous PRN  
 acetaminophen (TYLENOL) tablet 650 mg  650 mg Oral Q6H PRN Or  
 acetaminophen (TYLENOL) suppository 650 mg  650 mg Rectal Q6H PRN  polyethylene glycol (MIRALAX) packet 17 g  17 g Oral DAILY PRN  promethazine (PHENERGAN) tablet 12.5 mg  12.5 mg Oral Q6H PRN  
 enoxaparin (LOVENOX) injection 40 mg  40 mg SubCUTAneous DAILY  0.9% sodium chloride infusion  150 mL/hr IntraVENous CONTINUOUS  
 morphine injection 2 mg  2 mg IntraVENous Q3H PRN  
 ondansetron (ZOFRAN) injection 4 mg  4 mg IntraVENous Q4H PRN  
 levothyroxine (SYNTHROID) tablet 88 mcg  88 mcg Oral ACB  FLUoxetine (PROzac) capsule 20 mg  20 mg Oral QHS  morphine CR (MS CONTIN) tablet 15 mg  15 mg Oral Q12H  pantoprazole (PROTONIX) tablet 40 mg  40 mg Oral ACB&D  
 lidocaine (XYLOCAINE) 2 % viscous solution 15 mL  15 mL Mouth/Throat TIDAC  
 
 Facility-Administered Medications Ordered in Other Encounters Medication Dose Route Frequency  0.9% sodium chloride infusion  25 mL/hr IntraVENous CONTINUOUS  
 fentaNYL citrate (PF) injection 25-50 mcg  25-50 mcg IntraVENous Multiple  midazolam (VERSED) injection 0.5-2 mg  0.5-2 mg IntraVENous Multiple Discharge Info:  
Current Discharge Medication List  
  
START taking these medications Details  
ciprofloxacin HCl (CIPRO) 500 mg tablet Take 1 Tab by mouth every twelve (12) hours for 1 day. Qty: 2 Tab, Refills: 0  
  
morphine CR (MS CONTIN) 15 mg CR tablet Take 1 Tab by mouth every twelve (12) hours for 7 days. Max Daily Amount: 30 mg. 
Qty: 14 Tab, Refills: 0 Associated Diagnoses: Metastatic malignant neoplasm, unspecified site ([address]  
  
traMADoL (ULTRAM) 50 mg tablet Take 1 Tab by mouth every six (6) hours as needed for Pain for up to 7 days. Max Daily Amount: 200 mg. Qty: 28 Tab, Refills: 0 Associated Diagnoses: Metastatic malignant neoplasm, unspecified site ([address]  
  
naloxone (NARCAN) 4 mg/actuation nasal spray Use 1 spray intranasally, then discard. Repeat with new spray every 2 min as needed for opioid overdose symptoms, alternating nostrils. Qty: 1 Each, Refills: 0 CONTINUE these medications which have NOT CHANGED Details  
omeprazole (PRILOSEC) 40 mg capsule Take 1 Cap by mouth daily. Qty: 30 Cap, Refills: 2 Associated Diagnoses: Dyspepsia  
  
sucralfate (CARAFATE) 1 gram tablet Take 1 Tab by mouth four (4) times daily. Qty: 120 Tab, Refills: 2 Associated Diagnoses: Dyspepsia  
  
levothyroxine (SYNTHROID) 100 mcg tablet Take 1 Tab by mouth Daily (before breakfast). Qty: 90 Tab, Refills: 1 Associated Diagnoses: Fatigue, unspecified type  
  
ergocalciferol (DRISDOL) 50,000 unit capsule Take 1 Cap by mouth every seven (7) days. Qty: 15 Cap, Refills: 5 Associated Diagnoses: Vitamin D deficiency FLUoxetine (PROZAC) 20 mg capsule Take 3 Caps by mouth nightly. Qty: 270 Cap, Refills: 1  
  
hydroCHLOROthiazide (HYDRODIURIL) 25 mg tablet Take 1 Tab by mouth daily. Qty: 90 Tab, Refills: 1 Associated Diagnoses: Benign essential HTN  
  
CYANOCOBALAMIN, VITAMIN B-12, (VITAMIN B-12 PO) Take  by mouth.  
  
pravastatin (PRAVACHOL) 40 mg tablet Take 1 Tab by mouth daily. Qty: 90 Tab, Refills: 1 Associated Diagnoses: Pure hypercholesterolemia  
  
propranolol LA (INDERAL LA) 120 mg SR capsule Take 1 Cap by mouth daily. Qty: 90 Cap, Refills: 3 Associated Diagnoses: Benign essential HTN  
  
fluticasone-salmeterol (ADVAIR) 250-50 mcg/dose diskus inhaler Take 1 Puff by inhalation two (2) times a day. Qty: 3 Inhaler, Refills: 3  
  
fluticasone (FLONASE) 50 mcg/actuation nasal spray 2 sprays each nostril QD Qty: 1 Bottle, Refills: 2 Associated Diagnoses: Allergic rhinitis due to pollen, unspecified rhinitis seasonality  
  
magnesium oxide 500 mg tab Take  by mouth. STOP taking these medications  
  
 diazePAM (VALIUM) 5 mg tablet Comments:  
Reason for Stopping:   
   
 varenicline (CHANTIX STARTER ALIN) 0.5 mg (11)- 1 mg (42) DsPk Comments:  
Reason for Stopping:   
   
  
 
 
 
Time spent in patient discharge planning and coordination 35 minutes.  
 
Signed: 
Theresa Louis MD

## 2021-02-10 NOTE — PROGRESS NOTES
I'm seeing this lady tomorrow and I think you are Friday. She has a liver biopsy pending from yesterday I believe. I'm wondering if the liver biopsy shows pancreatic source if we should still evaluate hilar mass with EBUS to rule out second primary?     Sine

## 2021-02-12 PROBLEM — R62.7 FAILURE TO THRIVE IN ADULT: Status: ACTIVE | Noted: 2021-01-01

## 2021-02-12 PROBLEM — R62.7 FTT (FAILURE TO THRIVE) IN ADULT: Status: ACTIVE | Noted: 2021-01-01

## 2021-02-12 PROBLEM — R10.9 ABDOMINAL PAIN: Status: ACTIVE | Noted: 2021-01-01

## 2021-02-12 PROBLEM — R10.84 GENERALIZED ABDOMINAL PAIN: Status: ACTIVE | Noted: 2021-01-01

## 2021-02-12 PROBLEM — R41.0 CONFUSION: Status: ACTIVE | Noted: 2021-01-01

## 2021-02-12 PROBLEM — E86.0 DEHYDRATION: Status: ACTIVE | Noted: 2021-01-01

## 2021-02-12 NOTE — H&P
[facility]: Inpatient Hematology / Oncology History & Physical Note Reason for Admission: PCP Physician:  Logan Figueroa MD 
 
History of Present Illness: 
Ms Deysi Hutton is a 72yo woman with PMHx of HLD, HTN, vertigo, anxiety and depression with recent hx of abdominal pain/N/V. Her s/s started around Thanksgiving. She notes weight loss of ~20ls since then. She was recently seen at [facility]. US showed 1.8cm lesion in the pancreatic body and liver lesion in the right lobe. She was recommended to undergo abd MRI. She was recently admitted at [facility] and MRI showed multiple liver lesions concerning for metastatic disease. She underwent bx on 2/2 and path is pending. Reviewed with path today await IHCs. Her imaging also showed bone lesions in spine and pelvis. Subsequent CT chest from 2/9 showed lung lesions. She was evaluated by Dr Usha Garza and plan was to p/w EBUS if liver bx confirms pancreatic source to eval for second primary. Today, she was seen at [facility] for [place]. Unfortunately, she has gotten progressively worse since d/c. Her  is here with her and reported continued decline. She has hallucinated at home. She is on pain medications. This is somewhat improved since tapering morphine. She has significant abd pain and no BM for 7 days. + flatus per . No vomiting. She is not eating much. She sleeps most of the day. Admitting for symptom management/pain, r/o brain mets and worsening abd pain (which could be from her disease alone). PC saw pt at [facility]. Sending to [facility] infusion for IVF/labs first as she is awaiting a bed. Review of Systems: 
Constitutional Denies fever or chills. +weight loss or appetite changes. ++fatigue. Denies night sweats. HEENT Denies trauma, blurry vision, hearing loss, ear pain, nosebleeds, sore throat, neck pain and ear discharge. Skin Denies lesions or rashes. Lungs Denies dyspnea, cough, sputum production or hemoptysis. Cardiovascular Denies chest pain, palpitations, or lower extremity edema. Gastrointestinal + nausea, no vomiting.  + changes in bowel habits. ++ abdominal pain.  Denies dysuria, frequency or hesitancy of urination. Neuro Denies headaches, visual changes or ataxia. Denies dizziness, tingling, tremors, sensory change, speech change, focal weakness. + hallucinations / [place] [facility] Denies easy bruising or bleeding, denies gingival bleeding or epistaxis. Endo Denies heat/cold intolerance MSK +back pain, arthralgias, no myalgias or frequent falls. Psychiatric/Behavioral Denies depression and substance abuse. The patient is not nervous/anxious. Allergies Allergen Reactions  Sulfa (Sulfonamide Antibiotics) Anaphylaxis  Buspar [Buspirone] Vertigo  Pcn [Penicillins] Hives  Wellbutrin [Bupropion Hcl] Other (comments) Constipation Past Medical History:  
Diagnosis Date  Anxiety state, unspecified  Dyslipidemia   
 HTN (hypertension)  Major depressive disorder, recurrent episode, in partial or unspecified remission  Tobacco abuse  Vertigo Past Surgical History:  
Procedure Laterality Date [facility] COLONOSCOPY  2006, 2011 [address] [facility] ABDOMINAL HYSTERECTOMY  2000 [address] Family History Problem Relation Age of Onset  Coronary Artery Disease Father  Elevated Lipids Father  Hypertension Father  Heart Attack Father  Heart Disease Father  Bipolar Disorder Mother  Cancer Mother Leukemia/gastric  Alzheimer Maternal Grandmother  Heart Disease Maternal Grandfather  Cancer Maternal Grandfather  Breast Cancer Neg Hx Social History Socioeconomic History  Marital status:  Spouse name: Not on file  Number of children: Not on file  Years of education: Not on file  Highest education level: Not on file Occupational History  Not on file Social Needs  Financial resource strain: Not on file  Food insecurity Worry: Not on file Inability: Not on file  Transportation needs Medical: Not on file Non-medical: Not on file Tobacco Use  Smoking status: Current Every Day Smoker Packs/day: 1.00 Years: 48.00 Pack years: 48.00  Smokeless tobacco: Never Used Substance and Sexual Activity  Alcohol use: Yes Comment: rare  Drug use: No  
 Sexual activity: Yes  
  Partners: Male Lifestyle  Physical activity Days per week: Not on file Minutes per session: Not on file  Stress: Not on file Relationships  Social connections Talks on phone: Not on file Gets together: Not on file Attends Hinduism service: Not on file Active member of club or organization: Not on file Attends meetings of clubs or organizations: Not on file Relationship status: Not on file  Intimate partner violence Fear of current or ex partner: Not on file Emotionally abused: Not on file Physically abused: Not on file Forced sexual activity: Not on file Other Topics Concern  Not on file Social History Narrative  Not on file Current Outpatient Medications Medication Sig Dispense Refill  famotidine (PEPCID) 40 mg tablet Take 40 mg by mouth two (2) times a day.  losartan (COZAAR) 50 mg tablet Take 50 mg by mouth daily.  promethazine (PHENERGAN) 25 mg tablet TAKE 1 TABLET EVERY 6 HOURS AS NEEDED FOR NAUSEA AND VOMITING    
 ondansetron (ZOFRAN ODT) 4 mg disintegrating tablet Take 1 Tab by mouth every eight (8) hours as needed for Nausea or Vomiting.  60 Tab 1  
  naloxone (NARCAN) 4 mg/actuation nasal spray Use 1 spray intranasally, then discard. Repeat with new spray every 2 min as needed for opioid overdose symptoms, alternating nostrils. 1 Each 0  
 omeprazole (PRILOSEC) 40 mg capsule Take 1 Cap by mouth daily. 30 Cap 2  
 sucralfate (CARAFATE) 1 gram tablet Take 1 Tab by mouth four (4) times daily. 120 Tab 2  
 levothyroxine (SYNTHROID) 100 mcg tablet Take 1 Tab by mouth Daily (before breakfast). (Patient taking differently: Take 88 mcg by mouth Daily (before breakfast). ) 90 Tab 1  
 ergocalciferol (DRISDOL) 50,000 unit capsule Take 1 Cap by mouth every seven (7) days. 15 Cap 5  pravastatin (PRAVACHOL) 40 mg tablet Take 1 Tab by mouth daily. 90 Tab 1  propranolol LA (INDERAL LA) 120 mg SR capsule Take 1 Cap by mouth daily. 90 Cap 3  
 fluticasone-salmeterol (ADVAIR) 250-50 mcg/dose diskus inhaler Take 1 Puff by inhalation two (2) times a day. 3 Inhaler 3  
 FLUoxetine (PROZAC) 20 mg capsule Take 3 Caps by mouth nightly. 270 Cap 1  
 hydroCHLOROthiazide (HYDRODIURIL) 25 mg tablet Take 1 Tab by mouth daily. 90 Tab 1  
 CYANOCOBALAMIN, VITAMIN B-12, (VITAMIN B-12 PO) Take  by mouth.  fluticasone (FLONASE) 50 mcg/actuation nasal spray 2 sprays each nostril QD 1 Bottle 2  ibuprofen (MOTRIN) 800 mg tablet Take 1 Tab by mouth every eight (8) hours as needed for Pain. 90 Tab 1  
 magnesium oxide 500 mg tab Take  by mouth. OBJECTIVE: 
No data found. Temp (24hrs), Av.6 °F (36.4 °C), Min:97.6 °F (36.4 °C), Max:97.6 °F (36.4 °C) No intake/output data recorded. Physical Exam: 
Constitutional: ECOG - 1-2, pain 6-7, distress 2-3 Well developed, well nourished female on her side on exam table, answers but not always appropriately, forgetful HEENT: Normocephalic and atraumatic. Oropharynx is clear, mucous membranes are moist.  Pupils are equal, round, and reactive to light. Extraocular muscles are intact. Sclerae anicteric. Neck supple Lymph node No palpable submandibular, cervical, supraclavicular LN Skin Warm and dry. No bruising and no rash noted. No erythema. No pallor. Respiratory Lungs are clear to auscultation bilaterally without wheezes, rales or rhonchi, normal air exchange without accessory muscle use. CVS Normal rate, regular rhythm and normal S1 and S2 Abdomen Soft, tender to palpation, no guarding and no rebound. Neuro Grossly nonfocal with no obvious sensory or motor deficits. MSK Normal range of motion in general.  No edema and no tenderness. Psych Appropriate mood and affect. She is worried Labs:   
Recent Results (from the past 24 hour(s)) CBC WITH AUTOMATED DIFF Collection Time: 02/12/21  3:08 PM  
Result Value Ref Range WBC 10.0 4.3 - 11.1 K/uL  
 RBC 4.25 4.05 - 5.25 M/uL  
 HGB 10.9 (L) 11.7 - 15.4 g/dL HCT 32.7 (L) 35.8 - 46.3 % MCV 76.9 (L) 79.6 - 97.8 FL  
 MCH 25.6 (L) 26.1 - 32.9 PG  
 MCHC 33.3 31.4 - 35.0 g/dL  
 RDW 14.6 11.9 - 14.6 % PLATELET 003 (H) 235 - 450 K/uL MPV 9.2 (L) 9.4 - 12.3 FL ABSOLUTE NRBC 0.00 0.0 - 0.2 K/uL  
 DF AUTOMATED NEUTROPHILS 68 43 - 78 % LYMPHOCYTES 22 13 - 44 % MONOCYTES 9 4.0 - 12.0 % EOSINOPHILS 0 (L) 0.5 - 7.8 % BASOPHILS 0 0.0 - 2.0 % IMMATURE GRANULOCYTES 1 0.0 - 5.0 %  
 ABS. NEUTROPHILS 6.8 1.7 - 8.2 K/UL  
 ABS. LYMPHOCYTES 2.2 0.5 - 4.6 K/UL  
 ABS. MONOCYTES 0.9 0.1 - 1.3 K/UL  
 ABS. EOSINOPHILS 0.0 0.0 - 0.8 K/UL  
 ABS. BASOPHILS 0.0 0.0 - 0.2 K/UL  
 ABS. IMM. GRANS. 0.1 0.0 - 0.5 K/UL Imaging: No images are attached to the encounter. ASSESSMENT: 
 
Active Problems: 
  Generalized abdominal pain (1/31/2021) Confusion (2/12/2021) Failure to thrive in adult (2/12/2021) PLAN: 
- admit for further evaluation - start with KUB to assess abdomen, if negative, CT  
- pain regimen recs given by [facility]; wait until she starts bowel regimen until she has imaging of her abdomen - IVF in infusion, and will continue maintenece once admitted - MRI brain for increasing confusion/paranoid hallucinations  
- UA/culture  
- replace lytes as needed. Gabe Zaman MD 
[facility]emat[facility] Oncology [address] [place] [address] Office : [phone] Fax : [phone]

## 2021-02-12 NOTE — PROGRESS NOTES
Arrived to infusion Added on after [facility] visit Here by w/c,accompanied by significant other Tearful, reports weakness, inability to keep even fluids down. Practitioner aware. Iv fluids given, labs drawn discharged home via [facility] to wait for room assignment for admission

## 2021-02-12 NOTE — PROGRESS NOTES
2/12/2021  Pt/spouse seen today with Dr Debi Vargas initial visit metastatic disease, unknown primary. Pt had liver biopsy 2/2 , no results yet. Palliative also in room with pt. Pt c/o increased weakness, fatigue and abdominal pain, states not eating and drinking much. Will go to Infusion for labs/hydration and get admitted for acute abdominal pain, confusion and failure to thrive. Instructions given to , verbalized understanding. Navigation information given to pt, Encouraged to call with any questions/concerns.

## 2021-02-12 NOTE — PROGRESS NOTES
2/12/2021  Called [facility] 3rd floor Oncology and gave report to [facility]. Questions/concerns answered.

## 2021-02-13 NOTE — PROGRESS NOTES
[facility] Hematology & Oncology Inpatient Hematology / Oncology Progress Note Admission Date: 2021 10:08 PM 
Reason for Admission/Hospital Course: FTT (failure to thrive) in adult [R62.7] Abdominal pain [R10.9] Confusion [R41.0] 24 Hour Events: 
Afeb, VSS stable Ongoing abdominal pain Notes hallucinations over the past few days; ?r/t pain medications KUB negative for obstruction ROS: 
Constitutional: negative for fever, chills, weakness, malaise, fatigue. CV:  negative for chest pain, palpitations, edema. Respiratory: negative for dyspnea, cough, wheezing. GI:  negative for nausea, diarrhea. + for abdominal pain and constipation 10 point review of systems is otherwise negative with the exception of the elements mentioned above in the HPI. Allergies Allergen Reactions  Sulfa (Sulfonamide Antibiotics) Anaphylaxis  Buspar [Buspirone] Vertigo  Pcn [Penicillins] Hives  Wellbutrin [Bupropion Hcl] Other (comments) Constipation OBJECTIVE: 
Patient Vitals for the past 8 hrs: 
 BP Temp Pulse Resp SpO2  
21 0830     98 % 21 0749 (!) 141/72 98.2 °F (36.8 °C) (!) 105 19 98 % 21 0340 (!) 148/76 98.5 °F (36.9 °C) (!) 103 18  Temp (24hrs), Av.3 °F (36.8 °C), Min:97.6 °F (36.4 °C), Max:98.7 °F (37.1 °C) No intake/output data recorded. Physical Exam: 
Constitutional: Well developed, well nourished female in no acute distress, sitting comfortably in the hospital bed. HEENT: Normocephalic and atraumatic. Oropharynx is clear, mucous membranes are moist.  Pupils are equal, round, and reactive to light. Extraocular muscles are intact. Sclerae anicteric. Neck supple without JVD. No thyromegaly present. Lymph node Deferred Skin Warm and dry. No bruising and no rash noted. No erythema. No pallor. Respiratory Lungs are clear to auscultation bilaterally without wheezes, rales or rhonchi, normal air exchange without accessory muscle use. CVS Normal rate, regular rhythm and normal S1 and S2. No murmurs, gallops, or rubs. Abdomen Soft, tender and nondistended, normoactive bowel sounds. No palpable mass. No hepatosplenomegaly. Neuro Grossly nonfocal with no obvious sensory or motor deficits. MSK Normal range of motion in general.  No edema and no tenderness. Psych Appropriate mood and affect. Labs: 
   
Recent Labs  
  02/13/21 
0508 02/12/21 
1508 WBC 9.3 10.0  
RBC 4.10 4.25  
HGB 10.5* 10.9* HCT 32.0* 32.7* MCV 78.0* 76.9*  
MCH 25.6* 25.6*  
MCHC 32.8 33.3 RDW 14.6 14.6 * 560* GRANS 71 68 LYMPH 19 22 MONOS 10 9 EOS 0* 0*  
BASOS 0 0 IG 0 1  
DF AUTOMATED AUTOMATED ANEU 6.6 6.8 ABL 1.7 2.2 ABM 0.9 0.9 CITLALY 0.0 0.0 ABB 0.0 0.0 AIG 0.0 0.1 Recent Labs  
  02/13/21 
0508 02/12/21 
1508 * 132* K 3.6 3.0*  
CL 98 97* CO2 22 21 AGAP 12 14 GLU 92 96 BUN 8 10 CREA 0.65 0.60 GFRAA >60 >60 GFRNA >60 >60  
CA 9.5 9.7 * 252* TP 6.7 7.0 ALB 2.6* 2.7*  
GLOB 4.1* 4.3* AGRAT 0.6* 0.6* MG 1.7* 1.8 Imaging: 
 
Medications: 
Current Facility-Administered Medications Medication Dose Route Frequency  famotidine (PEPCID) tablet 40 mg  40 mg Oral DAILY  FLUoxetine (PROzac) capsule 60 mg  60 mg Oral QHS  fluticasone propionate (FLONASE) 50 mcg/actuation nasal spray 2 Spray  2 Spray Both Nostrils DAILY  budesonide-formoteroL (SYMBICORT) 160-4.5 mcg/actuation HFA inhaler 2 Puff  2 Puff Inhalation BID RT  
 levothyroxine (SYNTHROID) tablet 88 mcg  88 mcg Oral ACB  pravastatin (PRAVACHOL) tablet 40 mg  40 mg Oral QHS  traMADoL (ULTRAM) tablet 50 mg  50 mg Oral Q6H PRN  propranolol LA (INDERAL LA) capsule 120 mg  120 mg Oral DAILY  losartan (COZAAR) tablet 50 mg  50 mg Oral DAILY  0.9% sodium chloride infusion  75 mL/hr IntraVENous CONTINUOUS  
 enoxaparin (LOVENOX) injection 40 mg  40 mg SubCUTAneous Q24H  
 
 
 
ASSESSMENT: 
 
Problem List  Date Reviewed: 2/12/2021 Codes Class Noted Dehydration ICD-10-CM: E86.0 ICD-9-CM: 276.51  2/12/2021 Confusion ICD-10-CM: R41.0 ICD-9-CM: 298.9  2/12/2021 Failure to thrive in adult ICD-10-CM: R62.7 ICD-9-CM: 783.7  2/12/2021 Abdominal pain ICD-10-CM: R10.9 ICD-9-CM: 789.00  2/12/2021 FTT (failure to thrive) in adult ICD-10-CM: R62.7 ICD-9-CM: 783.7  2/12/2021 Generalized abdominal pain ICD-10-CM: R10.84 ICD-9-CM: 789.07  1/31/2021 Hepatic lesion ICD-10-CM: K76.9 ICD-9-CM: 573.8  1/30/2021 Leukocytosis ICD-10-CM: I82.481 ICD-9-CM: 288.60  1/30/2021 UTI (urinary tract infection) ICD-10-CM: N39.0 ICD-9-CM: 599.0  1/30/2021 Pancreatic lesion ICD-10-CM: K86.9 ICD-9-CM: 577.9  1/30/2021 Elevated LFTs ICD-10-CM: R79.89 ICD-9-CM: 790.6  1/30/2021 Lung nodule ICD-10-CM: R91.1 ICD-9-CM: 793.11  9/23/2020 Dyspepsia ICD-10-CM: R10.13 ICD-9-CM: 536.8  2/6/2019 Cervicalgia ICD-10-CM: M54.2 ICD-9-CM: 723.1  2/6/2019 Seborrheic keratoses, inflamed ICD-10-CM: L82.0 ICD-9-CM: 702.11  8/14/2018 History of cigarette smoking ICD-10-CM: Z87.891 ICD-9-CM: V15.82  7/10/2018 Gastroesophageal reflux disease without esophagitis ICD-10-CM: K21.9 ICD-9-CM: 530.81  5/17/2018 Palpitations ICD-10-CM: R00.2 ICD-9-CM: 785.1  7/25/2017 Microscopic hematuria ICD-10-CM: R31.29 ICD-9-CM: 599.72  6/27/2017 Memory loss ICD-10-CM: R41.3 ICD-9-CM: 780.93  10/5/2016 Breast mass in female ICD-10-CM: N63.0 ICD-9-CM: 611.72  10/27/2015 Fatigue due to depression ICD-10-CM: F32.9, R53.83 ICD-9-CM: 206, 780.79  10/23/2015 Acquired hypothyroidism ICD-10-CM: E03.9 ICD-9-CM: 244.9  10/23/2015 Essential hypertension ICD-10-CM: I10 
ICD-9-CM: 401.9  Unknown Depression, unipolar ([facility] [address] ICD-10-CM: F33.9 ICD-9-CM: 296.20  Unknown Pure hypercholesterolemia ICD-10-CM: E78.00 ICD-9-CM: 272.0  Unknown PLAN: 
Pancreatic body and liver lesion - path from biopsy on 2/2 pending Abdominal pain  
- KUB negative, but did note? Foreign body in the left upper abdomen, but unsure if intraabdominal or exterior. Dr. Inderjit Figueroa personally reviewed images. Reviewed palliative care recommendations for pain management. They stated could trial oxycodone 5 mg vs dilaudid 2 mg. Will try oxycodone 5 mg PRN. Will hold tramadol and Morphine in light of possible AMS. Hallucinations/Confusion 
-Brain MRI pending 
-Hold Morphine and Tramadol. Try Oxycodone. Opioid induced constipation 
- Since KUB negative, Palliative care recommended lactulose TID x 3 doses and then Pericolace 2 tablets daily - BID. Goals and plan of care reviewed with the patient. All questions answered to the best of our ability. YELENA Cee [facility] Hematology and Oncology/Radiation Oncology [address] [place] [address] Office : [phone] Fax : [phone]

## 2021-02-13 NOTE — PROGRESS NOTES
Patient admitted for FTT, confusion, and AMS per chart review. No family at the bedside at this time. SW will meet with patient again when family is present to obtain a baseline assessment. RIVER Weiss  [facility] * [email or web address]

## 2021-02-13 NOTE — PROGRESS NOTES
End of Shift Report: 
 
- Pt pleasantly confused throughout the day. Kept bed alarm on or pt would get up and wander. 
- Kept reorienting patient to where she was and why she was in the hospital. 
- Pt's  tearful at bedside. Report given to oncoming RN, Emeli Valencia, RN

## 2021-02-13 NOTE — PROGRESS NOTES
02/12/21 2225 Dual Skin Pressure Injury Assessment Dual Skin Pressure Injury Assessment WDL Second Care Provider (Based on [address] [place] [address]) Alexi RN Skin Integumentary Skin Integumentary (WDL) WDL Pressure  Injury Documentation No Pressure Injury Noted-Pressure Ulcer Prevention Initiated

## 2021-02-14 NOTE — PROGRESS NOTES
SW familiar with patient from previous admission. Met with patient and her  Juanita Xie to confirm that nothing has changed. Previous baseline assessment is attached below and confirmed to be accurate. SW met with patient who confirmed demographic information, states that she lives with her  Juanita Xie and has no other local family. Patient does report that the couple have many children that they have in essence adopted and close friends who are like family. Patient uses a walker to ambulate, only fell once in the last twelve months which was while riding a bike, and states that since becoming sick she's required ADL assistance from her . Anticipate home with family at discharge. RIVER Monzon  Cristofer [email or web address]

## 2021-02-14 NOTE — PROGRESS NOTES
[facility] Hematology & Oncology Inpatient Hematology / Oncology Progress Note Admission Date: 2021 10:08 PM 
Reason for Admission/Hospital Course: FTT (failure to thrive) in adult [R62.7] Abdominal pain [R10.9] Confusion [R41.0] 24 Hour Events: 
Afeb, VSS stable Ongoing abdominal pain- she refused PRN pain medication this AM. Ongoing confusion and hallucinations KUB negative for obstruction Afeb,hypertensive at times. ROS: 
Constitutional: negative for fever, chills, weakness, malaise, fatigue. CV:  negative for chest pain, palpitations, edema. Respiratory: negative for dyspnea, cough, wheezing. GI:  negative for nausea, diarrhea. + for abdominal pain and constipation 10 point review of systems is otherwise negative with the exception of the elements mentioned above in the HPI. Allergies Allergen Reactions  Sulfa (Sulfonamide Antibiotics) Anaphylaxis  Buspar [Buspirone] Vertigo  Pcn [Penicillins] Hives  Wellbutrin [Bupropion Hcl] Other (comments) Constipation OBJECTIVE: 
Patient Vitals for the past 8 hrs: 
 BP Temp Pulse Resp SpO2 Weight  
21 0738 (!) 189/93 97.6 °F (36.4 °C) 75 17 96 %   
21 0345      120 lb (54.4 kg) 21 0316 (!) 162/66 97.5 °F (36.4 °C) 73 16 99 %  Temp (24hrs), Av.8 °F (36.6 °C), Min:97.3 °F (36.3 °C), Max:98.5 °F (36.9 °C) No intake/output data recorded. Physical Exam: 
Constitutional: Well developed, well nourished female in no acute distress, sitting comfortably in the hospital bed. Periods of confusion. HEENT: Normocephalic and atraumatic. Oropharynx is clear, mucous membranes are moist.  Pupils are equal, round, and reactive to light. Extraocular muscles are intact. Sclerae anicteric. Neck supple without JVD. No thyromegaly present. Lymph node Deferred Skin Warm and dry. No bruising and no rash noted. No erythema. No pallor. Respiratory Lungs are clear to auscultation bilaterally without wheezes, rales or rhonchi, normal air exchange without accessory muscle use. CVS Normal rate, regular rhythm and normal S1 and S2. No murmurs, gallops, or rubs. Abdomen Soft, tender and nondistended, normoactive bowel sounds. No palpable mass. No hepatosplenomegaly. Neuro Grossly nonfocal with no obvious sensory or motor deficits. MSK Normal range of motion in general.  No edema and no tenderness. Psych Appropriate mood and affect. Labs: 
   
Recent Labs  
  02/14/21 0440 02/13/21 
0508 02/12/21 
1508 WBC 9.3 9.3 10.0  
RBC 4.08 4.10 4.25  
HGB 10.6* 10.5* 10.9* HCT 31.6* 32.0* 32.7* MCV 77.5* 78.0* 76.9*  
MCH 26.0* 25.6* 25.6*  
MCHC 33.5 32.8 33.3 RDW 14.6 14.6 14.6 * 523* 560* GRANS 72 71 68 LYMPH 19 19 22 MONOS 8 10 9 EOS 0* 0* 0*  
BASOS 0 0 0 IG 1 0 1 DF AUTOMATED AUTOMATED AUTOMATED ANEU 6.7 6.6 6.8 ABL 1.8 1.7 2.2 ABM 0.7 0.9 0.9 CITLALY 0.0 0.0 0.0 ABB 0.0 0.0 0.0 AIG 0.1 0.0 0.1 Recent Labs  
  02/14/21 0440 02/13/21 
0508 02/12/21 
1508 * 132* 132* K 3.1* 3.6 3.0*  
 98 97* CO2 22 22 21 AGAP 11 12 14 GLU 92 92 96 BUN 8 8 10 CREA 0.46* 0.65 0.60 GFRAA >60 >60 >60 GFRNA >60 >60 >60  
CA 9.0 9.5 9.7 * 250* 252* TP 6.1* 6.7 7.0 ALB 2.3* 2.6* 2.7*  
GLOB 3.8* 4.1* 4.3* AGRAT 0.6* 0.6* 0.6* MG 1.8 1.7* 1.8 Imaging: 
 
Medications: 
Current Facility-Administered Medications Medication Dose Route Frequency  alum-mag hydroxide-simeth (MYLANTA) oral suspension 30 mL  30 mL Oral Q4H PRN  potassium chloride (K-DUR, KLOR-CON) SR tablet 20 mEq  20 mEq Oral BID  
 oxyCODONE IR (ROXICODONE) tablet 5 mg  5 mg Oral Q6H PRN  
 ondansetron (ZOFRAN) injection 8 mg  8 mg IntraVENous Q8H PRN  
 lactulose (CHRONULAC) 10 gram/15 mL solution 15 mL  10 g Oral TID  hydrALAZINE (APRESOLINE) 20 mg/mL injection 10 mg  10 mg IntraVENous Q6H PRN  
 famotidine (PEPCID) tablet 40 mg  40 mg Oral DAILY  FLUoxetine (PROzac) capsule 60 mg  60 mg Oral QHS  fluticasone propionate (FLONASE) 50 mcg/actuation nasal spray 2 Spray  2 Spray Both Nostrils DAILY  budesonide-formoteroL (SYMBICORT) 160-4.5 mcg/actuation HFA inhaler 2 Puff  2 Puff Inhalation BID RT  
 levothyroxine (SYNTHROID) tablet 88 mcg  88 mcg Oral ACB  pravastatin (PRAVACHOL) tablet 40 mg  40 mg Oral QHS  [Held by provider] traMADoL (ULTRAM) tablet 50 mg  50 mg Oral Q6H PRN  propranolol LA (INDERAL LA) capsule 120 mg  120 mg Oral DAILY  losartan (COZAAR) tablet 50 mg  50 mg Oral DAILY  0.9% sodium chloride infusion  75 mL/hr IntraVENous CONTINUOUS  
 enoxaparin (LOVENOX) injection 40 mg  40 mg SubCUTAneous Q24H  
 
 
 
ASSESSMENT: 
 
Problem List  Date Reviewed: 2/12/2021 Codes Class Noted Dehydration ICD-10-CM: E86.0 ICD-9-CM: 276.51  2/12/2021 Confusion ICD-10-CM: R41.0 ICD-9-CM: 298.9  2/12/2021 Failure to thrive in adult ICD-10-CM: R62.7 ICD-9-CM: 783.7  2/12/2021 Abdominal pain ICD-10-CM: R10.9 ICD-9-CM: 789.00  2/12/2021 FTT (failure to thrive) in adult ICD-10-CM: R62.7 ICD-9-CM: 783.7  2/12/2021 Generalized abdominal pain ICD-10-CM: R10.84 ICD-9-CM: 789.07  1/31/2021 Hepatic lesion ICD-10-CM: K76.9 ICD-9-CM: 573.8  1/30/2021 Leukocytosis ICD-10-CM: Z74.442 ICD-9-CM: 288.60  1/30/2021 UTI (urinary tract infection) ICD-10-CM: N39.0 ICD-9-CM: 599.0  1/30/2021 Pancreatic lesion ICD-10-CM: K86.9 ICD-9-CM: 577.9  1/30/2021 Elevated LFTs ICD-10-CM: R79.89 ICD-9-CM: 790.6  1/30/2021 Lung nodule ICD-10-CM: R91.1 ICD-9-CM: 793.11  9/23/2020 Dyspepsia ICD-10-CM: R10.13 ICD-9-CM: 536.8  2/6/2019 Cervicalgia ICD-10-CM: M54.2 ICD-9-CM: 723.1  2/6/2019 Seborrheic keratoses, inflamed ICD-10-CM: L82.0 ICD-9-CM: 702.11  8/14/2018 History of cigarette smoking ICD-10-CM: Z87.891 ICD-9-CM: V15.82  7/10/2018 Gastroesophageal reflux disease without esophagitis ICD-10-CM: K21.9 ICD-9-CM: 530.81  5/17/2018 Palpitations ICD-10-CM: R00.2 ICD-9-CM: 785.1  7/25/2017 Microscopic hematuria ICD-10-CM: R31.29 ICD-9-CM: 599.72  6/27/2017 Memory loss ICD-10-CM: R41.3 ICD-9-CM: 780.93  10/5/2016 Breast mass in female ICD-10-CM: N63.0 ICD-9-CM: 611.72  10/27/2015 Fatigue due to depression ICD-10-CM: F32.9, R53.83 ICD-9-CM: 006, 780.79  10/23/2015 Acquired hypothyroidism ICD-10-CM: E03.9 ICD-9-CM: 244.9  10/23/2015 Essential hypertension ICD-10-CM: I10 
ICD-9-CM: 401.9  Unknown Depression, unipolar ([facility] [address] ICD-10-CM: F33.9 ICD-9-CM: 296.20  Unknown Pure hypercholesterolemia ICD-10-CM: E78.00 ICD-9-CM: 272.0  Unknown PLAN: 
Pancreatic body and liver lesion - path from biopsy on 2/2 pending Abdominal pain  
- KUB negative, but did note? Foreign body in the left upper abdomen, but unsure if intraabdominal or exterior. Dr. Doristine Sandhoff personally reviewed images. Reviewed palliative care recommendations for pain management. They stated could trial oxycodone 5 mg vs dilaudid 2 mg. Will try oxycodone 5 mg PRN. Will hold tramadol and Morphine in light of possible AMS. 2/14 some abdominal pain this AM, but she wishes to hold off on pain medication at this time. Hallucinations/Confusion 
-Brain MRI pending 
-Hold Morphine and Tramadol. Try Oxycodone. 2/14 MRI of the brain with some pachymeningeal thickening and enhancement of the right convexity. Infection like meningitis is possible and intracranial hypotension is less likely. 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 Will order LP for tomorrow with cytology. Of note, patient notes that she has had a LP in the past with Dr. Jay Ibarra, will see if we can obtain records. Opioid induced constipation 
- Since KUB negative, Palliative care recommended lactulose TID x 3 doses and then Pericolace 2 tablets daily - BID. 2/14 on lactulose TID x 3 doses. Goals and plan of care reviewed with the patient. All questions answered to the best of our ability. Betty Perez, COREY-REBECA [facility] Hematology and Oncology/Radiation Oncology [address] [place] [address] Office : [phone] Fax : [phone]

## 2021-02-15 NOTE — PROGRESS NOTES
[facility] Hematology & Oncology Inpatient Hematology / Oncology Progress Note Admission Date: 2021 10:08 PM 
Reason for Admission/Hospital Course: FTT (failure to thrive) in adult [R62.7] Abdominal pain [R10.9] Confusion [R41.0] 24 Hour Events:  
Ongoing confusion Check ammonia Lactulose for constipation IR consulted for LP Consult neurology ROS: 
Constitutional: negative for fever, chills, weakness, malaise, fatigue. CV:  negative for chest pain, palpitations, edema. Respiratory: negative for dyspnea, cough, wheezing. GI:  negative for nausea, diarrhea. + for abdominal pain and constipation 10 point review of systems is otherwise negative with the exception of the elements mentioned above in the HPI. Allergies Allergen Reactions  Sulfa (Sulfonamide Antibiotics) Anaphylaxis  Buspar [Buspirone] Vertigo  Pcn [Penicillins] Hives  Wellbutrin [Bupropion Hcl] Other (comments) Constipation OBJECTIVE: 
Patient Vitals for the past 8 hrs: 
 BP Temp Pulse Resp SpO2  
02/15/21 0740 (!) 153/80 97.7 °F (36.5 °C) 79 16 99 % 02/15/21 0300 (!) 187/90 98.2 °F (36.8 °C) 74 14 97 % 21 2357 (!) 179/86 97.8 °F (36.6 °C) 76 18 98 % Temp (24hrs), Av °F (36.7 °C), Min:97.7 °F (36.5 °C), Max:98.3 °F (36.8 °C) No intake/output data recorded. Physical Exam: 
Constitutional: Well developed, well nourished female in no acute distress, sitting comfortably in the hospital bed. HEENT: Normocephalic and atraumatic. Oropharynx is clear, mucous membranes are moist.  Pupils are equal, round, and reactive to light. Extraocular muscles are intact. Sclerae anicteric. Lymph node Deferred Skin Warm and dry. No bruising and no rash noted. No erythema. No pallor. Respiratory Lungs are clear to auscultation bilaterally without wheezes, rales or rhonchi, normal air exchange without accessory muscle use. CVS Normal rate, regular rhythm and normal S1 and S2. No murmurs, gallops, or rubs. Abdomen Soft, tender and nondistended, normoactive bowel sounds. No palpable mass. No hepatosplenomegaly. Neuro Pleasantly confused. Oriented to self. MSK Normal range of motion in general.  No edema and no tenderness. Psych Pleasantly confused Labs: 
   
Recent Labs  
  02/15/21 
0546 02/14/21 
0440 02/13/21 
6775 WBC 11.6* 9.3 9.3  
RBC 4.39 4.08 4.10 HGB 11.4* 10.6* 10.5* HCT 33.8* 31.6* 32.0*  
MCV 77.0* 77.5* 78.0*  
MCH 26.0* 26.0* 25.6*  
MCHC 33.7 33.5 32.8  
RDW 14.6 14.6 14.6 * 529* 523* GRANS 73 72 71 LYMPH 19 19 19 MONOS 7 8 10 EOS 0* 0* 0*  
BASOS 0 0 0 IG 1 1 0  
DF AUTOMATED AUTOMATED AUTOMATED ANEU 8.5* 6.7 6.6 ABL 2.2 1.8 1.7 ABM 0.8 0.7 0.9 CITLALY 0.0 0.0 0.0 ABB 0.0 0.0 0.0 AIG 0.1 0.1 0.0 Recent Labs  
  02/15/21 
0546 02/14/21 
0440 02/13/21 
8590 * 134* 132* K 3.6 3.1* 3.6  101 98 CO2 19* 22 22 AGAP 13 11 12 * 92 92 BUN 9 8 8 CREA 0.56* 0.46* 0.65 GFRAA >60 >60 >60 GFRNA >60 >60 >60  
CA 9.5 9.0 9.5 * 243* 250* TP 6.4 6.1* 6.7 ALB 2.4* 2.3* 2.6*  
GLOB 4.0* 3.8* 4.1* AGRAT 0.6* 0.6* 0.6* MG 1.8 1.8 1.7* Imaging: 
 
Medications: 
Current Facility-Administered Medications Medication Dose Route Frequency  alum-mag hydroxide-simeth (MYLANTA) oral suspension 30 mL  30 mL Oral Q4H PRN  potassium chloride (K-DUR, KLOR-CON) SR tablet 20 mEq  20 mEq Oral BID  
 oxyCODONE IR (ROXICODONE) tablet 5 mg  5 mg Oral Q6H PRN  
 ondansetron (ZOFRAN) injection 8 mg  8 mg IntraVENous Q8H PRN  
 lactulose (CHRONULAC) 10 gram/15 mL solution 15 mL  10 g Oral TID  hydrALAZINE (APRESOLINE) 20 mg/mL injection 10 mg  10 mg IntraVENous Q6H PRN  
 famotidine (PEPCID) tablet 40 mg  40 mg Oral DAILY  FLUoxetine (PROzac) capsule 60 mg  60 mg Oral QHS  fluticasone propionate (FLONASE) 50 mcg/actuation nasal spray 2 Spray  2 Spray Both Nostrils DAILY  budesonide-formoteroL (SYMBICORT) 160-4.5 mcg/actuation HFA inhaler 2 Puff  2 Puff Inhalation BID RT  
 levothyroxine (SYNTHROID) tablet 88 mcg  88 mcg Oral ACB  pravastatin (PRAVACHOL) tablet 40 mg  40 mg Oral QHS  [Held by provider] traMADoL (ULTRAM) tablet 50 mg  50 mg Oral Q6H PRN  propranolol LA (INDERAL LA) capsule 120 mg  120 mg Oral DAILY  losartan (COZAAR) tablet 50 mg  50 mg Oral DAILY  0.9% sodium chloride infusion  75 mL/hr IntraVENous CONTINUOUS  
 enoxaparin (LOVENOX) injection 40 mg  40 mg SubCUTAneous Q24H  
 
 
 
ASSESSMENT: 
 
Problem List  Date Reviewed: 2/12/2021 Codes Class Noted Dehydration ICD-10-CM: E86.0 ICD-9-CM: 276.51  2/12/2021 Confusion ICD-10-CM: R41.0 ICD-9-CM: 298.9  2/12/2021 Failure to thrive in adult ICD-10-CM: R62.7 ICD-9-CM: 783.7  2/12/2021 Abdominal pain ICD-10-CM: R10.9 ICD-9-CM: 789.00  2/12/2021 FTT (failure to thrive) in adult ICD-10-CM: R62.7 ICD-9-CM: 783.7  2/12/2021 Generalized abdominal pain ICD-10-CM: R10.84 ICD-9-CM: 789.07  1/31/2021 Hepatic lesion ICD-10-CM: K76.9 ICD-9-CM: 573.8  1/30/2021 Leukocytosis ICD-10-CM: X46.056 ICD-9-CM: 288.60  1/30/2021 UTI (urinary tract infection) ICD-10-CM: N39.0 ICD-9-CM: 599.0  1/30/2021 Pancreatic lesion ICD-10-CM: K86.9 ICD-9-CM: 577.9  1/30/2021 Elevated LFTs ICD-10-CM: R79.89 ICD-9-CM: 790.6  1/30/2021 Lung nodule ICD-10-CM: R91.1 ICD-9-CM: 793.11  9/23/2020 Dyspepsia ICD-10-CM: R10.13 ICD-9-CM: 536.8  2/6/2019 Cervicalgia ICD-10-CM: M54.2 ICD-9-CM: 723.1  2/6/2019 Seborrheic keratoses, inflamed ICD-10-CM: L82.0 ICD-9-CM: 702.11  8/14/2018 History of cigarette smoking ICD-10-CM: Z87.891 ICD-9-CM: V15.82  7/10/2018 Gastroesophageal reflux disease without esophagitis ICD-10-CM: K21.9 ICD-9-CM: 530.81  5/17/2018 Palpitations ICD-10-CM: R00.2 ICD-9-CM: 785.1  7/25/2017 Microscopic hematuria ICD-10-CM: R31.29 ICD-9-CM: 599.72  6/27/2017 Memory loss ICD-10-CM: R41.3 ICD-9-CM: 780.93  10/5/2016 Breast mass in female ICD-10-CM: N63.0 ICD-9-CM: 611.72  10/27/2015 Fatigue due to depression ICD-10-CM: F32.9, R53.83 ICD-9-CM: 766, 780.79  10/23/2015 Acquired hypothyroidism ICD-10-CM: E03.9 ICD-9-CM: 244.9  10/23/2015 Essential hypertension ICD-10-CM: I10 
ICD-9-CM: 401.9  Unknown Depression, unipolar ([address] ICD-10-CM: F33.9 ICD-9-CM: 296.20  Unknown Pure hypercholesterolemia ICD-10-CM: E78.00 ICD-9-CM: 272.0  Unknown PLAN: 
Pancreatic body and liver lesion - path from biopsy on 2/2 pending Abdominal pain  
- KUB negative, but did note? Foreign body in the left upper abdomen, but unsure if intraabdominal or exterior. Dr. Prema Latif personally reviewed images. Reviewed palliative care recommendations for pain management. They stated could trial oxycodone 5 mg vs dilaudid 2 mg. Will try oxycodone 5 mg PRN. Will hold tramadol and Morphine in light of possible AMS. 2/14 some abdominal pain this AM, but she wishes to hold off on pain medication at this time. Hallucinations/Confusion 
-Brain MRI pending 
-Hold Morphine and Tramadol. Try Oxycodone. 2/14 MRI of the brain with some pachymeningeal thickening and enhancement of the right convexity. Infection like meningitis is possible and intracranial hypotension is less likely. 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 Will order LP for tomorrow with cytology. Of note, patient notes that she has had a LP in the past with Dr. Lakia Katz, will see if we can obtain records. 2/15 IR consulted for LP. Check ammonia. Consult neuro. Opioid induced constipation - Since KUB negative, Palliative care recommended lactulose TID x 3 doses and then Pericolace 2 tablets daily - BID. 2/14 on lactulose TID x 3 doses. All questions answered to the best of our ability. 2/15 Called histology department regarding pending liver biopsy results. Dr. Joelle Ortega has sent out sample for further testing which will become available to us as soon as possible. Hope to get LP today and neuro to see. Frankie Soni NP [facility] Hematology and Oncology/Radiation Oncology [address] [place] [address] Office : [phone] Fax : [phone] Attending Addendum: 
I have personally performed a face to face diagnostic evaluation on this patient. I have reviewed and agree with the care plan as documented above by Frankie Soni, N.CIRA.  My findings are as follows: Patient appears lethargic, heart rate regular without murmurs, abdomen is non-tender, bowel sounds are positive. 76 female now with pancreatic body and liver lesion, await biopsy results. Baseline confusion continues, for LP today, check ammonia levels and get neuro consulted. Renzo Smith MD 
[facility] Hematology/Oncology [address] [address] Office : [phone] Fax : [phone]

## 2021-02-15 NOTE — PROGRESS NOTES
TRANSFER - OUT REPORT:    Verbal report given to [facility] on Jacquie Robertson  being transferred to [facility] for routine progression of care       Report consisted of patients Situation, Background, Assessment and   Recommendations(SBAR). Information from the following report(s) SBAR, Kardex, Procedure Summary and MAR was reviewed with the receiving nurse. Lines:   Peripheral IV 02/12/21 Left; Lower Arm (Active)   Site Assessment Clean, dry, & intact 02/15/21 0753   Phlebitis Assessment 0 02/15/21 0753   Infiltration Assessment 0 02/15/21 0753   Dressing Status Clean, dry, & intact 02/15/21 0753   Dressing Type Tape;Transparent 02/15/21 0753   Hub Color/Line Status Patent; Flushed 02/15/21 0753   Alcohol Cap Used No 02/15/21 0753        Opportunity for questions and clarification was provided.       Patient transported with:   Yuri Henley

## 2021-02-15 NOTE — PROGRESS NOTES
End of Shift Note: 
 
- Pt pleasantly confused. - Oxy and zofran given for abd pain and nausea 
- Pt's  stated that the pt called multiple people on her phone last, relayed the message to oncoming RN Elsi Husbands) to remove pt's phone while she is sleeping. Report given to oncoming RN, Brandon Cox.  
 
Pieter Frye, RN

## 2021-02-15 NOTE — PROCEDURES
Department of Interventional Radiology  [phone]        Interventional Radiology Brief Procedure Note    Patient: Joshua Hennessy MRN: [id number]  SSN: [id number]    YOB: 1955  Age: 76 y.o.   Sex: female      Date of Procedure: 2/15/2021    Pre-Procedure Diagnosis: AMS    Post-Procedure Diagnosis: SAME    Procedure(s): Lumbar Puncture    Brief Description of Procedure: as above    Performed By: Jenniffer Deras MD     Assistants: None    Anesthesia:Lidocaine    Estimated Blood Loss: None    Specimens:  Pathology    Implants:  None    Findings: l4-5, 10 ml clear fluid    Complications: None    Recommendations: routine post care     Follow Up: as needed    Signed By: Jenniffer Deras MD     February 15, 2021

## 2021-02-15 NOTE — PROGRESS NOTES
Pt arrived back from IR. Reassessed pt to make sure there were no changes. Consult with [facility] neuro was arranged. Tele Neuro was set-up in room.   
 
 
Sarabjit Ng RN

## 2021-02-15 NOTE — PROGRESS NOTES
Procedure:Lumbar Puncture. Approximately 10cc of fluid. Dressing to mid lower back CD&I  CSF sample sent to lab. Pt tolerated without difficulty.

## 2021-02-16 PROBLEM — I10 HYPERTENSION: Status: ACTIVE | Noted: 2021-01-01

## 2021-02-16 NOTE — H&P
[facility] CARDIOLOGY History &Physical 
            
 
 
Subjective:  
 
Sandor Gentile is a 76 y.o. female with prior h/o HTN, HLP, vertigo, anxiety and depression. Recent h/o abd pain with nausea/vomiting, confusion per family. He reported sx started around Thanksgiving. Now with ~ 20 pounds weight loss. She recently saw Naomie and [address] showed 1.8cm lesion in the pancreatic body and liver lesion in the right lobe.  She was recommended to undergo abd MRI.  She was recently admitted at [facility] and MRI showed multiple liver lesions concerning for metastatic disease. She underwent bx on 2/2 and path is pending. Additional imaging also showed bone lesions in spine and pelvis. Subsequent CT chest from 2/9 showed lung lesions. She was seen in office on 2/12 with hematology . Unfortunately, she was worse with hallucinations as well and was a direct admit for further care. She underwent MRI brain that showed imaging also showed bone lesions in spine and pelvis. Subsequent CT chest from 2/9 showed lung lesions. LP was performed showing no meningitis, cytology pending. Also liver bx still pending as well. Cardiology was consulted for uncontrolled HTN as the patient with elevated BP throughout her admission, upwards of 192/93. Patient reports long standing history of HTN. Family member at bedside who reports that the patient was delirious, and has continued to be confused. She is able to give minimal subjective history at this time. Denies chest pain, dyspnea, PND, orthopnea, leg swelling. She does report abdominal pain. No other complaints or associated symptoms. Lab Results Component Value Date  02/16/2021 K 3.6 02/16/2021 MG 1.7 (L) 02/16/2021 BUN 12 02/16/2021 CREA 0.58 (L) 02/16/2021 WBC 11.2 (H) 02/16/2021 HGB 11.3 (L) 02/16/2021  (H) 02/16/2021 TSH 4.810 (H) 06/26/2018 Past Medical History:  
Diagnosis Date  Anxiety state, unspecified  Dyslipidemia  HTN (hypertension)  Major depressive disorder, recurrent episode, in partial or unspecified remission  Tobacco abuse  Vertigo Past Surgical History:  
Procedure Laterality Date [place]  HX COLONOSCOPY  2006, 2011 [address] ABDOMINAL HYSTERECTOMY  2000 [address] Family History Problem Relation Age of Onset  Coronary Artery Disease Father  Elevated Lipids Father  Hypertension Father  Heart Attack Father  Heart Disease Father  Bipolar Disorder Mother  Cancer Mother Leukemia/gastric  Alzheimer Maternal Grandmother  Heart Disease Maternal Grandfather  Cancer Maternal Grandfather  Breast Cancer Neg Hx Social History Tobacco Use  Smoking status: Current Every Day Smoker Packs/day: 1.00 Years: 48.00 Pack years: 48.00  Smokeless tobacco: Never Used Substance Use Topics  Alcohol use: Yes Comment: rare Allergies Allergen Reactions  Sulfa (Sulfonamide Antibiotics) Anaphylaxis  Buspar [Buspirone] Vertigo  Pcn [Penicillins] Hives  Wellbutrin [Bupropion Hcl] Other (comments) Constipation ROS - limited as patietn with confusion Denies chest pain, palpitations, dyspnea, PND, orthopnea, leg edema, difficulty moving arms or legs. + abdominal pain , confusion Objective:  
 
 
Visit Vitals BP (!) 163/74 (BP 1 Location: Right upper arm) Pulse 73 Temp 97.1 °F (36.2 °C) Resp 18 Ht 4' 11\" (1.499 m) Wt 118 lb 3.2 oz (53.6 kg) SpO2 95% Breastfeeding No  
BMI 23.87 kg/m²  
 
 
02/16 0701 - 02/16 1900 In: 120 [P.O.:120] Out: -  
02/14 1901 - 02/16 0700 In: 360 [P.O.:360] Out: 200 [Urine:200] Physical Exam  
Constitutional: She appears well-developed and well-nourished. She appears lethargic. No distress. HENT:  
Head: Normocephalic and atraumatic. Right Ear: External ear normal.  
Left Ear: External ear normal.  
Nose: Nose normal.  
Neck: Normal range of motion. No JVD present. No tracheal deviation present. Cardiovascular: Normal rate, regular rhythm, S1 normal, S2 normal and normal heart sounds. Exam reveals no friction rub. No murmur heard. Pulses: 
     Radial pulses are 2+ on the right side and 2+ on the left side. Pulmonary/Chest: Effort normal and breath sounds normal. No stridor. No respiratory distress. She has no wheezes. She has no rales. Abdominal: Soft. She exhibits no distension. There is abdominal tenderness (epigastrium to left upper quadrant). There is no rebound. Neurological: She appears lethargic. Skin: Skin is warm and dry. No rash noted. She is not diaphoretic. No erythema. Psychiatric:  
Somnolent, but interactive. Vitals reviewed. Data Review:  
Recent Labs  
  02/16/21 
0559 02/15/21 
0546  135* K 3.6 3.6 MG 1.7* 1.8 BUN 12 9 CREA 0.58* 0.56* GLU 95 109* WBC 11.2* 11.6* HGB 11.3* 11.4* HCT 33.4* 33.8*  
* 632* Assessment/Plan: Active Problems: 
  Pancreatic lesion (1/30/2021) Generalized abdominal pain (1/31/2021) Confusion (2/12/2021) Failure to thrive in adult (2/12/2021) Abdominal pain (2/12/2021) FTT (failure to thrive) in adult (2/12/2021) 
  - ongoing evaluation per hematology/oncology Hypertension (2/16/2021) - known history - Echo 2/16/21: with normal LVEF, no hemodynamically significant valvular abnormalities - increased Losartan to 100 mg po daily 
  - renal function normal 
  - monitor for response  
  - consider addition of calcium channel blocker e.g. Amlodipine if further control needed - Suspect some HTN related to pain response, pain control per luz Padron,  
2/16/2021 
4:49 PM

## 2021-02-16 NOTE — CONSULTS
Palliative Care Patient: Roberto Nix MRN: [id number]  SSN: [id number] YOB: 1955  Age: 76 y.o. Sex: female Date of Request: 2/12/21 Date of Consult:  2/16/2021 Reason for Consult:  pain and symptom management Requesting Physician: NICOLAS Ascencio Assessment/Plan:  
 
Principal Diagnosis:   
Pain, abdomen  R10.9 Additional Diagnoses:  
· Constipation, Unspecified  K59.00 · Nausea/Vomiting  R11.2 
· Encounter for Palliative Care  Z51.5 Palliative Performance Scale (PPS): 
  
 
Medical Decision Making:  
Reviewed and summarized notes from admission to present. Discussed case with appropriate providers: MOHSEN Mark Reviewed laboratory and x-ray data from admission to present. Pt resting in bed, no visitors present. Ms Juanita Mcghee spoke with some hesitation, but was able to convey her concerns. She endorsed discomfort across her abdomen, rubbing her hands from side to side across her abdomen. She also endorsed nausea. MOHSEN Tinajero was made aware and will bring medications. Have added Narcan,  and added PeriColace to prevent constipation. Results of the recent liver biopsy and yesterday's lumbar puncture are pending. A goals of care discussion will be appropriate once these tests have resulted and Hem/Onc has reviewed them with the pt and . Will discuss findings with members of the interdisciplinary team.   
 
Thank you for this referral.    
 
  
. 
 
Subjective:  
 
History obtained from:  Care Provider and Chart Chief Complaint: Abdominal discomfort History of Present Illness:   
 
  
Advance Directive: No      
Code Status:  Full Code Health Care Power of : No - Patient does not have a [address] Past Medical History:  
Diagnosis Date  Anxiety state, unspecified  Dyslipidemia   
 HTN (hypertension)  Major depressive disorder, recurrent episode, in partial or unspecified remission  Tobacco abuse  Vertigo Past Surgical History:  
Procedure Laterality Date [place]  HX COLONOSCOPY  2006, 2011 [address] [facility] [address] ABDOMINAL HYSTERECTOMY  [address]  Family History Problem Relation Age of Onset  Coronary Artery Disease Father  Elevated Lipids Father  Hypertension Father  Heart Attack Father  Heart Disease Father  Bipolar Disorder Mother  Cancer Mother Leukemia/gastric  Alzheimer Maternal Grandmother  Heart Disease Maternal Grandfather  Cancer Maternal Grandfather  Breast Cancer Neg Hx Social History Tobacco Use  Smoking status: Current Every Day Smoker Packs/day: 1.00 Years: 48.00 Pack years: 48.00  Smokeless tobacco: Never Used Substance Use Topics  Alcohol use: Yes Comment: rare Prior to Admission medications Medication Sig Start Date End Date Taking? Authorizing Provider  
famotidine (PEPCID) 40 mg tablet Take 40 mg by mouth two (2) times a day. 1/19/21  Yes Provider, Historical  
losartan (COZAAR) 50 mg tablet Take 50 mg by mouth daily. 11/30/20 11/30/21 Yes Provider, Historical  
promethazine (PHENERGAN) 25 mg tablet TAKE 1 TABLET EVERY 6 HOURS AS NEEDED FOR NAUSEA AND VOMITING 1/7/21  Yes Provider, Historical  
ondansetron (ZOFRAN ODT) 4 mg disintegrating tablet Take 1 Tab by mouth every eight (8) hours as needed for Nausea or Vomiting. 2/11/21  Yes Dillon Reyna MD  
naloxone [facility]) 4 mg/actuation nasal spray Use 1 spray intranasally, then discard. Repeat with new spray every 2 min as needed for opioid overdose symptoms, alternating nostrils. 2/4/21  Yes Luma rBush MD  
omeprazole (PRILOSEC) 40 mg capsule Take 1 Cap by mouth daily.  2/6/19  Yes Ruth Rudd MD  
 sucralfate (CARAFATE) 1 gram tablet Take 1 Tab by mouth four (4) times daily. 2/6/19  Yes Anjel Thurston MD  
levothyroxine (SYNTHROID) 100 mcg tablet Take 1 Tab by mouth Daily (before breakfast). Patient taking differently: Take 88 mcg by mouth Daily (before breakfast). 10/18/18  Yes Anjel Thurston MD  
ergocalciferol (DRISDOL) 50,000 unit capsule Take 1 Cap by mouth every seven (7) days. 10/18/18  Yes Anjel Thurston MD  
pravastatin (PRAVACHOL) 40 mg tablet Take 1 Tab by mouth daily. 10/18/18  Yes Anjel Thurston MD  
propranolol LA (INDERAL LA) 120 mg SR capsule Take 1 Cap by mouth daily. 10/18/18  Yes Anjel Thurston MD  
fluticasone-salmeterol (ADVAIR) 250-50 mcg/dose diskus inhaler Take 1 Puff by inhalation two (2) times a day. 10/18/18  Yes Anjel Thurston MD  
FLUoxetine (PROZAC) 20 mg capsule Take 3 Caps by mouth nightly. 10/18/18  Yes Anjel Thurston MD  
hydroCHLOROthiazide (HYDRODIURIL) 25 mg tablet Take 1 Tab by mouth daily. 7/10/18  Yes Anjel Thurston MD  
CYANOCOBALAMIN, VITAMIN B-12, (VITAMIN B-12 PO) Take  by mouth. Yes Provider, Historical  
ibuprofen (MOTRIN) 800 mg tablet Take 1 Tab by mouth every eight (8) hours as needed for Pain. 5/25/17  Yes Anjel Thurston MD  
magnesium oxide 500 mg tab Take  by mouth. Yes Provider, Historical  
fluticasone (FLONASE) 50 mcg/actuation nasal spray 2 sprays each nostril QD 6/27/17   Anjel Thurston MD  
 
 
Allergies Allergen Reactions  Sulfa (Sulfonamide Antibiotics) Anaphylaxis  Buspar [Buspirone] Vertigo  Pcn [Penicillins] Hives  Wellbutrin [Bupropion Hcl] Other (comments) Constipation Review of Systems: A comprehensive review of systems was negative except as described above. Objective:  
 
Visit Vitals /69 (BP 1 Location: Right upper arm) Pulse 76 Temp 97.4 °F (36.3 °C) Resp 18 Ht 4' 11\" (1.499 m) Wt 118 lb 3.2 oz (53.6 kg) SpO2 94% Breastfeeding No  
BMI 23.87 kg/m² Physical Exam: 
 
General:  Cooperative. No acute distress. Eyes:  Conjunctivae/corneas clear Nose: Nares normal. Septum midline. Neck: Supple, symmetrical, trachea midline, no JVD Lungs:   Clear to auscultation bilaterally, unlabored Heart:  Regular rate and rhythm, no murmur Abdomen:   Soft, moderately tender to papation, non-distended Extremities: Normal, atraumatic, no cyanosis or edema Skin: Skin color, texture, turgor normal. No rash or lesions. Neurologic: Nonfocal  
Psych: Alert and oriented x 2 Assessment:  
 
Hospital Problems  Date Reviewed: 2/12/2021 Codes Class Noted POA Hypertension ICD-10-CM: I10 
ICD-9-CM: 401.9  2/16/2021 Unknown Confusion ICD-10-CM: R41.0 ICD-9-CM: 298.9  2/12/2021 Unknown Failure to thrive in adult ICD-10-CM: R62.7 ICD-9-CM: 783.7  2/12/2021 Unknown Abdominal pain ICD-10-CM: R10.9 ICD-9-CM: 789.00  2/12/2021 Unknown FTT (failure to thrive) in adult ICD-10-CM: R62.7 ICD-9-CM: 783.7  2/12/2021 Unknown Generalized abdominal pain ICD-10-CM: R10.84 ICD-9-CM: 789.07  1/31/2021 Unknown Pancreatic lesion ICD-10-CM: K86.9 ICD-9-CM: 577.9  1/30/2021 Yes Signed By: Lisbet Messer NP February 16, 2021

## 2021-02-16 NOTE — PROGRESS NOTES
[facility] Hematology & Oncology Inpatient Hematology / Oncology Progress Note Admission Date: 2021 10:08 PM 
Reason for Admission/Hospital Course: FTT (failure to thrive) in adult [R62.7] Abdominal pain [R10.9] Confusion [R41.0] 24 Hour Events:  
Ongoing confusion/hallucinations Tele neuro visit this am 
LP-no meningitis-cytology pending Liver biopsy results still pending ROS: 
Constitutional: negative for fever, chills, weakness, malaise, fatigue. CV:  negative for chest pain, palpitations, edema. Respiratory: negative for dyspnea, cough, wheezing. GI:  negative for nausea, diarrhea. + for abdominal pain and constipation 10 point review of systems is otherwise negative with the exception of the elements mentioned above in the HPI. Allergies Allergen Reactions  Sulfa (Sulfonamide Antibiotics) Anaphylaxis  Buspar [Buspirone] Vertigo  Pcn [Penicillins] Hives  Wellbutrin [Bupropion Hcl] Other (comments) Constipation OBJECTIVE: 
Patient Vitals for the past 8 hrs: 
 BP Temp Pulse Resp SpO2  
21 0859     99 % 21 0738 (!) 192/93 97.5 °F (36.4 °C) 77 18 98 % 21 0340 (!) 184/83 97 °F (36.1 °C) 79 18 98 % Temp (24hrs), Av.4 °F (36.3 °C), Min:97 °F (36.1 °C), Max:97.8 °F (36.6 °C) No intake/output data recorded. Physical Exam: 
Constitutional: Well developed, well nourished female in no acute distress, sitting comfortably in the hospital bed. HEENT: Normocephalic and atraumatic. Oropharynx is clear, mucous membranes are moist.  Pupils are equal, round, and reactive to light. Extraocular muscles are intact. Sclerae anicteric. Lymph node Deferred Skin Warm and dry. No bruising and no rash noted. No erythema. No pallor. Respiratory Lungs are clear to auscultation bilaterally without wheezes, rales or rhonchi, normal air exchange without accessory muscle use. CVS Normal rate, regular rhythm and normal S1 and S2. No murmurs, gallops, or rubs. Abdomen Soft, tender and nondistended, normoactive bowel sounds. No palpable mass. No hepatosplenomegaly. Neuro Pleasantly confused. Oriented to self. MSK Normal range of motion in general.  No edema and no tenderness. Psych Pleasantly confused Labs: 
   
Recent Labs  
  02/16/21 
0559 02/15/21 
0546 02/14/21 
0440 WBC 11.2* 11.6* 9.3  
RBC 4.33 4.39 4.08  
HGB 11.3* 11.4* 10.6* HCT 33.4* 33.8* 31.6* MCV 77.1* 77.0* 77.5*  
MCH 26.1 26.0* 26.0*  
MCHC 33.8 33.7 33.5  
RDW 14.7* 14.6 14.6 * 632* 529* GRANS 71 73 72 LYMPH 18 19 19 MONOS 10 7 8 EOS 0* 0* 0*  
BASOS 0 0 0 IG 1 1 1 DF AUTOMATED AUTOMATED AUTOMATED ANEU 8.0 8.5* 6.7 ABL 2.1 2.2 1.8 ABM 1.1 0.8 0.7 CITLALY 0.0 0.0 0.0 ABB 0.0 0.0 0.0 AIG 0.1 0.1 0.1 Recent Labs  
  02/16/21 
0559 02/15/21 
0546 02/14/21 
0440  135* 134* K 3.6 3.6 3.1*  
 103 101 CO2 22 19* 22 AGAP 10 13 11 GLU 95 109* 92 BUN 12 9 8 CREA 0.58* 0.56* 0.46* GFRAA >60 >60 >60 GFRNA >60 >60 >60  
CA 9.6 9.5 9.0 * 276* 243* TP 6.4 6.4 6.1* ALB 2.5* 2.4* 2.3*  
GLOB 3.9* 4.0* 3.8* AGRAT 0.6* 0.6* 0.6* MG 1.7* 1.8 1.8 Imaging: 
 
Medications: 
Current Facility-Administered Medications Medication Dose Route Frequency  alum-mag hydroxide-simeth (MYLANTA) oral suspension 30 mL  30 mL Oral Q4H PRN  potassium chloride (K-DUR, KLOR-CON) SR tablet 20 mEq  20 mEq Oral BID  
 oxyCODONE IR (ROXICODONE) tablet 5 mg  5 mg Oral Q6H PRN  
 ondansetron (ZOFRAN) injection 8 mg  8 mg IntraVENous Q8H PRN  
 hydrALAZINE (APRESOLINE) 20 mg/mL injection 10 mg  10 mg IntraVENous Q6H PRN  
 famotidine (PEPCID) tablet 40 mg  40 mg Oral DAILY  FLUoxetine (PROzac) capsule 60 mg  60 mg Oral QHS  fluticasone propionate (FLONASE) 50 mcg/actuation nasal spray 2 Spray  2 Spray Both Nostrils DAILY  budesonide-formoteroL (SYMBICORT) 160-4.5 mcg/actuation HFA inhaler 2 Puff  2 Puff Inhalation BID RT  
 levothyroxine (SYNTHROID) tablet 88 mcg  88 mcg Oral ACB  pravastatin (PRAVACHOL) tablet 40 mg  40 mg Oral QHS  [Held by provider] traMADoL (ULTRAM) tablet 50 mg  50 mg Oral Q6H PRN  propranolol LA (INDERAL LA) capsule 120 mg  120 mg Oral DAILY  losartan (COZAAR) tablet 50 mg  50 mg Oral DAILY  0.9% sodium chloride infusion  75 mL/hr IntraVENous CONTINUOUS  
 enoxaparin (LOVENOX) injection 40 mg  40 mg SubCUTAneous Q24H  
 
 
 
ASSESSMENT: 
 
Problem List  Date Reviewed: 2/12/2021 Codes Class Noted Dehydration ICD-10-CM: E86.0 ICD-9-CM: 276.51  2/12/2021 Confusion ICD-10-CM: R41.0 ICD-9-CM: 298.9  2/12/2021 Failure to thrive in adult ICD-10-CM: R62.7 ICD-9-CM: 783.7  2/12/2021 Abdominal pain ICD-10-CM: R10.9 ICD-9-CM: 789.00  2/12/2021 FTT (failure to thrive) in adult ICD-10-CM: R62.7 ICD-9-CM: 783.7  2/12/2021 Generalized abdominal pain ICD-10-CM: R10.84 ICD-9-CM: 789.07  1/31/2021 Hepatic lesion ICD-10-CM: K76.9 ICD-9-CM: 573.8  1/30/2021 Leukocytosis ICD-10-CM: Y85.366 ICD-9-CM: 288.60  1/30/2021 UTI (urinary tract infection) ICD-10-CM: N39.0 ICD-9-CM: 599.0  1/30/2021 Pancreatic lesion ICD-10-CM: K86.9 ICD-9-CM: 577.9  1/30/2021 Elevated LFTs ICD-10-CM: R79.89 ICD-9-CM: 790.6  1/30/2021 Lung nodule ICD-10-CM: R91.1 ICD-9-CM: 793.11  9/23/2020 Dyspepsia ICD-10-CM: R10.13 ICD-9-CM: 536.8  2/6/2019 Cervicalgia ICD-10-CM: M54.2 ICD-9-CM: 723.1  2/6/2019 Seborrheic keratoses, inflamed ICD-10-CM: L82.0 ICD-9-CM: 702.11  8/14/2018 History of cigarette smoking ICD-10-CM: Z87.891 ICD-9-CM: V15.82  7/10/2018 Gastroesophageal reflux disease without esophagitis ICD-10-CM: K21.9 ICD-9-CM: 530.81  5/17/2018 Palpitations ICD-10-CM: R00.2 ICD-9-CM: 785.1  7/25/2017 Microscopic hematuria ICD-10-CM: R31.29 ICD-9-CM: 599.72  6/27/2017 Memory loss ICD-10-CM: R41.3 ICD-9-CM: 780.93  10/5/2016 Breast mass in female ICD-10-CM: N63.0 ICD-9-CM: 611.72  10/27/2015 Fatigue due to depression ICD-10-CM: F32.9, R53.83 ICD-9-CM: 094, 780.79  10/23/2015 Acquired hypothyroidism ICD-10-CM: E03.9 ICD-9-CM: 244.9  10/23/2015 Essential hypertension ICD-10-CM: I10 
ICD-9-CM: 401.9  Unknown Depression, unipolar ([facility] [address] ICD-10-CM: F33.9 ICD-9-CM: 296.20  Unknown Pure hypercholesterolemia ICD-10-CM: E78.00 ICD-9-CM: 272.0  Unknown PLAN: 
Pancreatic body and liver lesion - path from biopsy on 2/2 pending Abdominal pain  
- KUB negative, but did note? Foreign body in the left upper abdomen, but unsure if intraabdominal or exterior. Dr. Kacy Maurice personally reviewed images. Reviewed palliative care recommendations for pain management. They stated could trial oxycodone 5 mg vs dilaudid 2 mg. Will try oxycodone 5 mg PRN. Will hold tramadol and Morphine in light of possible AMS. 2/14 some abdominal pain this AM, but she wishes to hold off on pain medication at this time. Hallucinations/Confusion 
-Brain MRI pending 
-Hold Morphine and Tramadol. Try Oxycodone. 2/14 MRI of the brain with some pachymeningeal thickening and enhancement of the right convexity. Infection like meningitis is possible and intracranial hypotension is less likely. 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 Will order LP for tomorrow with cytology. Of note, patient notes that she has had a LP in the past with Dr. Jya Ibarra, will see if we can obtain records. 2/15 IR consulted for LP. Check ammonia. Consult neuro. 2/16 Tele neuro consult this am-concerns for brain carcinoma. Recommend CT brain wo contrast 
 
Opioid induced constipation - Since KUB negative, Palliative care recommended lactulose TID x 3 doses and then Pericolace 2 tablets daily - BID. 2/14 on lactulose TID x 3 doses. Hypertension 2/16 cardiology consulted All questions answered to the best of our ability. Barney Hua NP [facility] Hematology and Oncology/Radiation Oncology [address] [place] [address] Office : [phone] Fax : [phone] Attending Addendum: 
I have personally performed a face to face diagnostic evaluation on this patient. I have reviewed and agree with the care plan as documented above by Sanaz Mercado N.P.  My findings are as follows: Patient appears lethargic, heart rate regular without murmurs, abdomen is non-tender, bowel sounds are positive. 76 female now with hilar mass, pancreatic body, bones and liver lesion, await biopsy results. Baseline confusion continues. S/p LP, CSF fluid w atypical cells. 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 Guarded prognosis.   
     
  
 
  
Radhika Robles MD 
[facility] Hematology/Oncology [address] [address] Office : [phone] Fax : [phone]

## 2021-02-16 NOTE — PROGRESS NOTES
Problem: Mobility Impaired (Adult and Pediatric) Goal: *Acute Goals and Plan of Care (Insert Text) Description: STG: 
(1.)Ms. Angelica Guerra will move from supine to sit and sit to supine  with STAND BY ASSIST within 4-7 treatment day(s). (2.)Ms. Angelica Guerra will transfer from bed to chair and chair to bed with CONTACT GUARD ASSIST using the least restrictive device within 4-7 treatment day(s). (3.)Ms. Angelica Guerra will ambulate with CONTACT GUARD ASSIST for 100 feet with the least restrictive device within 4-7 treatment day(s). ________________________________________________________________________________________________ Outcome: Progressing Towards Goal 
  
PHYSICAL THERAPY: Initial Assessment and AM 2/16/2021 INPATIENT: PT Visit Days : 1 Payor: Santy Davenport / Plan: [address] [place] [address] HMO / Product Type: Managed Care Medicare /   
  
NAME/AGE/GENDER: Zach Brown is a 76 y.o. female PRIMARY DIAGNOSIS: FTT (failure to thrive) in adult [R62.7] Abdominal pain [R10.9] Confusion [R41.0] <principal problem not specified> <principal problem not specified> 
  
  
ICD-10: Treatment Diagnosis:  
 Generalized Muscle Weakness (M62.81) Difficulty in walking, Not elsewhere classified (R26.2) Precaution/Allergies: 
Sulfa (sulfonamide antibiotics), Buspar [buspirone], Pcn [penicillins], and Wellbutrin [bupropion hcl] ASSESSMENT:  
 
 Ms. Carlos Moore presents supine on contact, she was admitted with above diagnosis. RN and SW stated that in addition to her bone lesions she also has brain mets. She does present with confusion as well but answers questions and follows commands. She would ask a lot of questions as well like what do ou want me to. She lives at home with her spouse and used a RW. She presents with generalized weakness and decreased independence with functional mobility. She will benefit from skilled PT interventions to maximize independence with functional mobility and strengthening. Worked on bed mobility and sit to stand. She appears to be uncomfortable in her back with mobility. Took some side steps at head of bed and returned to sitting. Sit to stand and again and worked on some steps with RW. Pt having some difficulty navigating walker and ended up helping her with hand held assist over to a wheelchair. Transport took pt to CT. Hope to progress at next therapy session. This section established at most recent assessment PROBLEM LIST (Impairments causing functional limitations): 
Decreased Strength Decreased ADL/Functional Activities Decreased Transfer Abilities Decreased Ambulation Ability/Technique Decreased Balance INTERVENTIONS PLANNED: (Benefits and precautions of physical therapy have been discussed with the patient.) Balance Exercise Bed Mobility Gait Training Therapeutic Activites Therapeutic Exercise/Strengthening Transfer Training TREATMENT PLAN: Frequency/Duration: daily for duration of hospital stay Rehabilitation Potential For Stated Goals: Good REHAB RECOMMENDATIONS (at time of discharge pending progress):   
Placement: It is my opinion, based on this patient's performance to date, that Ms. Gonzalez Connolly may benefit from [address] after discharge due to the functional deficits listed above that are likely to improve with skilled rehabilitation because he/she has multiple medical issues that affect his/her functional mobility in the community. Equipment: To be determined HISTORY:  
History of Present Injury/Illness (Reason for Referral): PER MD NOTE: Ms Gonzalez Connolly is a 72yo woman with PMHx of HLD, HTN, vertigo, anxiety and depression with recent hx of abdominal pain/N/V. Her s/s started around Thanksgiving. She notes weight loss of ~20ls since then. She was recently seen at [facility]. US showed 1.8cm lesion in the pancreatic body and liver lesion in the right lobe. She was recommended to undergo abd MRI. She was recently admitted at [facility] and MRI showed multiple liver lesions concerning for metastatic disease. She underwent bx on 2/2 and path is pending. Reviewed with path today await IHCs. Her imaging also showed bone lesions in spine and pelvis. Subsequent CT chest from 2/9 showed lung lesions. She was evaluated by Dr Skyler Kelley and plan was to p/w EBUS if liver bx confirms pancreatic source to eval for second primary. Today, she was seen at [facility] for [place]. Unfortunately, she has gotten progressively worse since d/c. Her  is here with her and reported continued decline. She has hallucinated at home. She is on pain medications. This is somewhat improved since tapering morphine. She has significant abd pain and no BM for 7 days. + flatus per . No vomiting. She is not eating much. She sleeps most of the day. Admitting for symptom management/pain, r/o brain mets and worsening abd pain (which could be from her disease alone). PC saw pt at [facility]. Sending to [facility] infusion for IVF/labs first as she is awaiting a bed. Past Medical History/Comorbidities: Ms. Steffi Sandhoff  has a past medical history of Anxiety state, unspecified, Dyslipidemia, HTN (hypertension), Major depressive disorder, recurrent episode, in partial or unspecified remission, Tobacco abuse, and Vertigo. Ms. Steffi Sandhoff  has a past surgical history that includes hx tonsillectomy (1995); hx total abdominal hysterectomy (2000); hx bladder suspension (2000); hx tubal ligation (1992); hx dilation and curettage (1999); and hx colonoscopy (2006, 2011). Social History/Living Environment:  
Home Environment: Private residence # Steps to Enter: 0 One/Two Story Residence: One story Living Alone: No 
Support Systems: Family member(s), Spouse/Significant Other/Partner Patient Expects to be Discharged to[de-identified] Private residence Current DME Used/Available at Home: Walker, rolling Prior Level of Function/Work/Activity: 
Pt living at home with her spouse, used a walker for mobility Number of Personal Factors/Comorbidities that affect the Plan of Care: 1-2: MODERATE COMPLEXITY EXAMINATION:  
Most Recent Physical Functioning:  
Gross Assessment: 
AROM: Generally decreased, functional 
Strength: Generally decreased, functional 
         
  
Posture: 
Posture (WDL): Ex[facility] to [facility] Posture Assessment: Forward head, Rounded shoulders Balance: 
Sitting: Intact Sitting - Static: Good (unsupported) Sitting - Dynamic: Fair (occasional) Standing: Pull to stand; With support Standing - Static: Good Standing - Dynamic : Fair Bed Mobility: 
Supine to Sit: Minimum assistance(head of bed elevated) Wheelchair Mobility: 
  
Transfers: 
Sit to Stand: Minimum assistance Stand to Sit: Minimum assistance Gait: 
  
Base of Support: Narrowed Speed/Keiko: Pace decreased (<100 feet/min); Shuffled Step Length: Left shortened;Right shortened Gait Abnormalities: Decreased step clearance;Shuffling gait Distance (ft): 8 Feet (ft) Assistive Device: Walker, rolling Ambulation - Level of Assistance: Minimal assistance Body Structures Involved: Muscles Body Functions Affected: Movement Related Activities and Participation Affected: Mobility Self Care Number of elements that affect the Plan of Care: 4+: HIGH COMPLEXITY CLINICAL PRESENTATION:  
Presentation: Stable and uncomplicated: LOW COMPLEXITY CLINICAL DECISION MAKIN [facility] [address] [id number] AM-PAC 6 Clicks Basic Mobility Inpatient Short Form How much difficulty does the patient currently have. .. Unable A Lot A Little None 1. Turning over in bed (including adjusting bedclothes, sheets and blankets)? [] 1   [] 2   [x] 3   [] 4  
2. Sitting down on and standing up from a chair with arms ( e.g., wheelchair, bedside commode, etc.)   [] 1   [] 2   [x] 3   [] 4  
3. Moving from lying on back to sitting on the side of the bed? [] 1   [] 2   [x] 3   [] 4 How much help from another person does the patient currently need. .. Total A Lot A Little None 4. Moving to and from a bed to a chair (including a wheelchair)? [] 1   [] 2   [x] 3   [] 4  
5. Need to walk in hospital room? [] 1   [] 2   [x] 3   [] 4  
6. Climbing 3-5 steps with a railing? [] 1   [] 2   [x] 3   [] 4  
© , Trustees of [address] [place] [address] [address] [address], under license to [employer]. All rights reserved Score:  Initial: 18 Most Recent: X (Date: -- ) Interpretation of Tool:  Represents activities that are increasingly more difficult (i.e. Bed mobility, Transfers, Gait). Medical Necessity:    
Patient is expected to demonstrate progress in  
strength and functional technique 
 to  
decrease assistance required with functional mobility and strengthening Omayra Fernandez Reason for Services/Other Comments: 
Patient continues to require skilled intervention due to Inability to complete functional mobility independently Omayra Fernandez Use of outcome tool(s) and clinical judgement create a POC that gives a: Clear prediction of patient's progress: LOW COMPLEXITY  
  
 
 
 
TREATMENT:  
 (In addition to Assessment/Re-Assessment sessions the following treatments were rendered) Pre-treatment Symptoms/Complaints:  some back pain 
Pain: Initial: some back pain Post Session: some back pain Assessment Therapeutic Activity: (    8):  Therapeutic activities including Bed transfers, Chair transfers, and Ambulation on level ground to improve mobility, strength, balance, and coordination. Required minimal verbal cues to stay on task and for safety  to promote static and dynamic balance in standing. Braces/Orthotics/Lines/Etc:  
IV 
O2 Device: Room air Treatment/Session Assessment:   
Response to Treatment:  tolerated fair, weak Interdisciplinary Collaboration:  
Registered Nurse After treatment position/precautions:  
Up in wheelchair, going to CT with transport Compliance with Program/Exercises: Will assess as treatment progresses Recommendations/Intent for next treatment session: \"Next visit will focus on advancements to more challenging activities and reduction in assistance provided\". Total Treatment Duration: PT Patient Time In/Time Out Time In: 1000 Time Out: 1018 Beba Fairbanks, PT

## 2021-02-16 NOTE — PROGRESS NOTES
Chart reviewed. Liver biopsy still pending. Pt continues with hallucinations. MRI of the brain is pending as concerns of brain cancer. At this time unsure of d/c needs.

## 2021-02-17 NOTE — PROGRESS NOTES
Pt. AAO X3 with periods of confusion. Pain denied. No signs of distress or discomfort. Pt. Was asleep and easy to arouse. Bed in the lowest position with call light and phone within reach.

## 2021-02-17 NOTE — PROGRESS NOTES
Problem: Mobility Impaired (Adult and Pediatric) Goal: *Acute Goals and Plan of Care (Insert Text) Description: STG: 
(1.)Ms. Daysi Souza will move from supine to sit and sit to supine  with STAND BY ASSIST within 4-7 treatment day(s). (2.)Ms. Daysi Souza will transfer from bed to chair and chair to bed with CONTACT GUARD ASSIST using the least restrictive device within 4-7 treatment day(s). (3.)Ms. Daysi Souza will ambulate with CONTACT GUARD ASSIST for 100 feet with the least restrictive device within 4-7 treatment day(s). ________________________________________________________________________________________________ Outcome: Progressing Towards Goal 
  
PHYSICAL THERAPY: Daily Note and AM 2/17/2021 INPATIENT: PT Visit Days : 2 Payor: Gwendolyn Virk / Plan: [address] [place] [address] HMO / Product Type: Managed Care Medicare /   
  
NAME/AGE/GENDER: Katy Benitez is a 76 y.o. female PRIMARY DIAGNOSIS: FTT (failure to thrive) in adult [R62.7] Abdominal pain [R10.9] Confusion [R41.0] <principal problem not specified> <principal problem not specified> 
 
  
ICD-10: Treatment Diagnosis:  
 · Generalized Muscle Weakness (M62.81) · Difficulty in walking, Not elsewhere classified (R26.2) Precaution/Allergies: 
Sulfa (sulfonamide antibiotics), Buspar [buspirone], Pcn [penicillins], and Wellbutrin [bupropion hcl] ASSESSMENT:  
 
 Ms. Aubrie Sadler presents supine on contact, she was admitted with above diagnosis. RN and SW stated that in addition to her bone lesions she also has brain mets. She does present with confusion as well but answers questions and follows commands. She would ask a lot of questions as well like what do ou want me to. She lives at home with her spouse and used a RW. She presents with generalized weakness and decreased independence with functional mobility. She will benefit from skilled PT interventions to maximize independence with functional mobility and strengthening. Worked on bed mobility and sit to stand. She appears to be uncomfortable in her back with mobility. Took some side steps at head of bed and returned to sitting. Sit to stand and again and worked on some steps with RW. Pt having some difficulty navigating walker and ended up helping her with hand held assist over to a wheelchair. Transport took pt to CT. Hope to progress at next therapy session. 2/17 AM; She is still a bit confused. When using the RW,she requires tactile cueing to stay inside RW during turns and backing up. Assisted to restroom. Ambulates around room, then stays up in recliner on alarm pad and completes exs. Recommend supervision at [place] to insure safe adjustment to home. The walker that is in her room is too tall for her. Could benefit from [facility]. Will speak to SW This section established at most recent assessment PROBLEM LIST (Impairments causing functional limitations): 1. Decreased Strength 2. Decreased ADL/Functional Activities 3. Decreased Transfer Abilities 4. Decreased Ambulation Ability/Technique 5. Decreased Balance INTERVENTIONS PLANNED: (Benefits and precautions of physical therapy have been discussed with the patient.) 1. Balance Exercise 2. Bed Mobility 3. Gait Training 4. Therapeutic Activites 5. Therapeutic Exercise/Strengthening 6. Transfer Training TREATMENT PLAN: Frequency/Duration: daily for duration of hospital stay Rehabilitation Potential For Stated Goals: Good REHAB RECOMMENDATIONS (at time of discharge pending progress):   
Placement: It is my opinion, based on this patient's performance to date, that Ms. Ab Saeed may benefit from [address] after discharge due to the functional deficits listed above that are likely to improve with skilled rehabilitation because he/she has multiple medical issues that affect his/her functional mobility in the community. Equipment: ? To be determined Could benefit from [employer]  
    
 
 
 
HISTORY:  
History of Present Injury/Illness (Reason for Referral): PER MD NOTE: Ms Ab Saeed is a 72yo woman with PMHx of HLD, HTN, vertigo, anxiety and depression with recent hx of abdominal pain/N/V. Her s/s started around Thanksgiving. She notes weight loss of ~20ls since then. She was recently seen at [facility]. US showed 1.8cm lesion in the pancreatic body and liver lesion in the right lobe. She was recommended to undergo abd MRI. She was recently admitted at [facility] and MRI showed multiple liver lesions concerning for metastatic disease. She underwent bx on 2/2 and path is pending. Reviewed with path today await IHCs. Her imaging also showed bone lesions in spine and pelvis. Subsequent CT chest from 2/9 showed lung lesions. She was evaluated by Dr Lyndsey Leija and plan was to p/w EBUS if liver bx confirms pancreatic source to eval for second primary. Today, she was seen at [facility] for [place]. Unfortunately, she has gotten progressively worse since d/c. Her  is here with her and reported continued decline. She has hallucinated at home. She is on pain medications. This is somewhat improved since tapering morphine. She has significant abd pain and no BM for 7 days. + flatus per . No vomiting. She is not eating much. She sleeps most of the day. Admitting for symptom management/pain, r/o brain mets and worsening abd pain (which could be from her disease alone). PC saw pt at [facility]. Sending to [facility] infusion for IVF/labs first as she is awaiting a bed. Past Medical History/Comorbidities: Ms. Rand Porter  has a past medical history of Anxiety state, unspecified, Dyslipidemia, HTN (hypertension), Major depressive disorder, recurrent episode, in partial or unspecified remission, Tobacco abuse, and Vertigo. Ms. Rand Porter  has a past surgical history that includes hx tonsillectomy (1995); hx total abdominal hysterectomy (2000); hx bladder suspension (2000); hx tubal ligation (1992); hx dilation and curettage (1999); and hx colonoscopy (2006, 2011). Social History/Living Environment:  
Home Environment: Private residence # Steps to Enter: 0 One/Two Story Residence: One story Living Alone: No 
Support Systems: Family member(s), Spouse/Significant Other/Partner Patient Expects to be Discharged to[de-identified] Private residence Current DME Used/Available at Home: Walker, rolling Prior Level of Function/Work/Activity: 
Pt living at home with her spouse, used a walker for mobility Number of Personal Factors/Comorbidities that affect the Plan of Care: 1-2: MODERATE COMPLEXITY EXAMINATION:  
Most Recent Physical Functioning:  
Gross Assessment: 
 grossly 3+ B LEs Posture: 
  
Balance: 
Sitting: Intact Standing: Pull to stand; With support Bed Mobility: 
  
Wheelchair Mobility: 
  
Transfers: 
Sit to Stand: Minimum assistance Stand to Sit: Minimum assistance Bed to Chair: Minimum assistance Toilet Transfer : Minimum assistance Duration: 15 Minutes Gait: 
  
Speed/Keiko: Pace decreased (<100 feet/min) Gait Abnormalities: Decreased step clearance; Path deviations Distance (ft): 30 Feet (ft) Assistive Device: Walker, rolling Ambulation - Level of Assistance: Minimal assistance Interventions: Safety awareness training; Tactile cues; Verbal cues; Visual/Demos Body Structures Involved: 1. Muscles Body Functions Affected: 1. Movement Related Activities and Participation Affected: 1. General Tasks and Demands 2. Mobility 3. Self Care Number of elements that affect the Plan of Care: 4+: HIGH COMPLEXITY CLINICAL PRESENTATION:  
Presentation: Stable and uncomplicated: LOW COMPLEXITY CLINICAL DECISION MAKIN [facility] [address] AM-PAC 6 Clicks Basic Mobility Inpatient Short Form How much difficulty does the patient currently have. .. Unable A Lot A Little None 1. Turning over in bed (including adjusting bedclothes, sheets and blankets)? [] 1   [] 2   [x] 3   [] 4  
2. Sitting down on and standing up from a chair with arms ( e.g., wheelchair, bedside commode, etc.)   [] 1   [] 2   [x] 3   [] 4  
3. Moving from lying on back to sitting on the side of the bed? [] 1   [] 2   [x] 3   [] 4 How much help from another person does the patient currently need. .. Total A Lot A Little None 4. Moving to and from a bed to a chair (including a wheelchair)? [] 1   [] 2   [x] 3   [] 4  
5. Need to walk in hospital room? [] 1   [] 2   [x] 3   [] 4  
6. Climbing 3-5 steps with a railing? [] 1   [] 2   [x] 3   [] 4  
© , Trustees of [address] [place] [address] [address] [address], under license to [employer]. All rights reserved Score:  Initial: 18 Most Recent: X (Date: -- ) Interpretation of Tool:  Represents activities that are increasingly more difficult (i.e. Bed mobility, Transfers, Gait). Medical Necessity: · Patient is expected to demonstrate progress in  
· strength and functional technique ·  to  
· decrease assistance required with functional mobility and strengthening · . Reason for Services/Other Comments: 
· Patient continues to require skilled intervention due to  
· Inability to complete functional mobility independently · . Use of outcome tool(s) and clinical judgement create a POC that gives a: Clear prediction of patient's progress: LOW COMPLEXITY  
  
 
 
 
TREATMENT:  
(In addition to Assessment/Re-Assessment sessions the following treatments were rendered) Pre-treatment Symptoms/Complaints:No c/o other than being tired Pain: Initial: some back pain Pain Intensity 1: 0  Post Session: 0 Therapeutic Exercise: (10 Minutes):  Exercises per grid below to improve mobility, strength, coordination and endurance. Required minimal visual, verbal and tactile cues to promote proper body alignment and promote proper body breathing techniques. Progressed resistance, range, repetitions and complexity of movement as indicated. Date: 
2/17/21 B Es Date: 
 Date: Activity/Exercise Parameters Parameters Parameters RECLINED: ankle pumps 10 Hip AB/AD 10 Heel slides 10 SLR 10 SHldr flex 10 Elbow flex 10 Therapeutic Activity: (  15 Minutes ):  Therapeutic activities including toilet transfers and toileting, Chair transfers, and Ambulation on level ground to improve mobility, strength, balance, and coordination. Required minimal verbal cues to stay on task and for safetySafety awareness training; Tactile cues; Verbal cues; Visual/Demos to promote static and dynamic balance in standing. Braces/Orthotics/Lines/Etc:  
· IV 
· O2 Device: Room air Treatment/Session Assessment:   
· Response to Treatment: fatigues easily · Interdisciplinary Collaboration:  
o Physical Therapist 
o Registered Nurse · After treatment position/precautions: o Up in chair 
o Bed alarm/tab alert on 
o Bed/Chair-wheels locked 
o Call light within reach 
o Nurse at bedside · Compliance with Program/Exercises: Will assess as treatment progresses · Recommendations/Intent for next treatment session: \"Next visit will focus on advancements to more challenging activities and reduction in assistance provided\". Total Treatment Duration: PT Patient Time In/Time Out Time In: 0805 Time Out: 0830 Eleuterio Cowart PT

## 2021-02-17 NOTE — PROGRESS NOTES
Palliative Care Progress Note Patient: Sophia Correa MRN: [id number]  SSN: [id number] YOB: 1955  Age: 76 y.o. Sex: female Assessment/Plan: Chief Complaint/Interval History: sleepy this morning, remains confused Principal Diagnosis: · Altered Mental Status R41.82 Additional Diagnoses: · Anorexia  R63.0 
· Constipation, Unspecified  K59.00 · Debility, Unspecified  R53.81 · Fatigue, Lethargy  R53.83 
· Nausea/Vomiting  R11.2 · Pain, abdomen  R10.9 · Encounter for Palliative Care  Z51.5 Palliative Performance Scale (PPS) Medical Decision Making:  
Reviewed and summarized notes over last 24 hours Discussed case with appropriate providers- MOHSEN Pruitt Reviewed laboratory and x-ray data from admission to present Pt lethargic, resting in bed, no distress noted. She will awaken to voice, and remains pleasantly confused. Pt continues to endorse abdominal pain. She has received scattered doses of Oxycodone since admission. She underwent LP 2 days ago, with atypical cells noted in the CSF. Oncology is arranging IT methotrexate. I am doubtful that narcotics are the primary cause of her AMS at this point. Pt did have a BM with Lactulose, and then it was discontinued. Pericolace 1 tab daily was started yesterday- will increase to 2 tabs BID. Discussed with MOHSEN Pruitt. Will continue to follow. Will discuss findings with members of the interdisciplinary team.   
 
  
More than 50% of this 15 minute visit was spent counseling and coordination of care as outlined above. Subjective:  
 
Review of Systems: A comprehensive review of systems was negative except for:  
Constitutional: Positive for fatigue. Gastrointestinal: Positive for abdominal pain Objective:  
 
Visit Vitals BP (!) 170/74 (BP 1 Location: Right arm, BP Patient Position: Lying left side; At rest) Pulse 76 Temp 97.6 °F (36.4 °C) Resp 18 Ht 4' 11\" (1.499 m) Wt 120 lb 9.6 oz (54.7 kg) SpO2 99% Breastfeeding No  
BMI 24.36 kg/m² Physical Exam: 
 
General:  Lethargic. No acute distress. Eyes:  Conjunctivae/corneas clear Nose: Nares normal. Septum midline. Neck: Supple, symmetrical, trachea midline Lungs:   Clear to auscultation bilaterally, unlabored Heart:  Regular rate and rhythm Abdomen:   Soft, mildly tender, non-distended Extremities: Normal, atraumatic, no cyanosis or edema Skin: Skin color, texture, turgor normal.  
Neurologic: Nonfocal  
Psych: Lethargic. Oriented to self Signed By: Howie Reyes NP February 17, 2021

## 2021-02-17 NOTE — PROGRESS NOTES
1800-END OF SHIFT NOTE: 
 
-pt rested well throughout the shift 
-MRIs scheduled for tomorrow as they did not have time for all of the scans today  
-[employer] transport set up for 0715 for a 0900 IR appt for IT chemo  
-pt not eating much, did not eat any breakfast/lunch, about 50% of dinner eaten 
-vss, no needs voiced at this time Intake/Output 02/17 0701 - 02/17 1900 In: 3165 [P.O.:250; I.V.:3114] Out: 400 [Urine:400] Voiding: YES Catheter: NO 
Drain:   
 
 
 
Stool:  0 occurrences. Stool Assessment Stool Color: Brown (02/14/21 1006) Stool Appearance: Formed; Soft (02/14/21 1006) Stool Amount: Large (02/14/21 1006) Stool Source/Status: Rectum (02/14/21 1006) Emesis:  0 occurrences. VITAL SIGNS Patient Vitals for the past 12 hrs: 
 Temp Pulse Resp BP SpO2  
02/17/21 1702    (!) 181/87   
02/17/21 1648 97.5 °F (36.4 °C) 71 18 (!) 191/92 100 % 02/17/21 1120 97.9 °F (36.6 °C) 71 18 (!) 176/72 99 % 02/17/21 0754     99 % 02/17/21 0730 97.6 °F (36.4 °C) 76 18 (!) 170/74 97 % Pain Assessment Pain 1 Pain Scale 1: Numeric (0 - 10) (02/17/21 0900) Pain Intensity 1: 0 (02/17/21 0900) Patient Stated Pain Goal: 0 (02/17/21 0857) Pain Reassessment 1: Patient resting w/respiratory rate greater than 10 (02/17/21 0857) Pain Onset 1: ongoing (02/17/21 0825) Pain Location 1: Back; Abdomen (02/17/21 0825) Pain Orientation 1: Mid (02/17/21 0825) Pain Description 1: Constant (02/17/21 0825) Pain Intervention(s) 1: Medication (see MAR) (02/17/21 0825) Ambulating Yes Additional Information:  
 
Shift report given to oncoming nurse at the bedside.  
 
Julio Cesar Smith RN

## 2021-02-17 NOTE — PROGRESS NOTES
[facility] Inpatient Hematology / Oncology Progress Note Admission Date: 2021 10:08 PM 
Reason for Admission/Hospital Course: FTT (failure to thrive) in adult [R62.7] Abdominal pain [R10.9] Confusion [R41.0] 24 Hour Events:  
LP-cytology atypical cells Consult IR for IT MTX 
PMH right breast lump PMH smoker 40 years Check breast tumor markers MRI spine ordered ROS: 
Constitutional: negative for fever, chills, weakness, malaise, fatigue. CV:  negative for chest pain, palpitations, edema. Respiratory: negative for dyspnea, cough, wheezing. GI:  negative for nausea, diarrhea. + for abdominal pain and constipation 10 point review of systems is otherwise negative with the exception of the elements mentioned above in the HPI. Allergies Allergen Reactions  Sulfa (Sulfonamide Antibiotics) Anaphylaxis  Buspar [Buspirone] Vertigo  Pcn [Penicillins] Hives  Wellbutrin [Bupropion Hcl] Other (comments) Constipation OBJECTIVE: 
Patient Vitals for the past 8 hrs: 
 BP Temp Pulse Resp SpO2  
21 0754     99 % 21 0730 (!) 170/74 97.6 °F (36.4 °C) 76 18 97 % 21 0309 (!) 173/80      
21 0229 (!) 190/95 97.9 °F (36.6 °C) 83 19 98 % Temp (24hrs), Av.7 °F (36.5 °C), Min:97.1 °F (36.2 °C), Max:98 °F (36.7 °C) No intake/output data recorded. Physical Exam: 
Constitutional: Well developed, well nourished female in no acute distress, sitting comfortably in the hospital bed. HEENT: Normocephalic and atraumatic. Oropharynx is clear, mucous membranes are moist.  Pupils are equal, round, and reactive to light. Extraocular muscles are intact. Sclerae anicteric. Lymph node Deferred Skin Warm and dry. No bruising and no rash noted. No erythema. No pallor. Respiratory Lungs are clear to auscultation bilaterally without wheezes, rales or rhonchi, normal air exchange without accessory muscle use. CVS Normal rate, regular rhythm and normal S1 and S2. No murmurs, gallops, or rubs. Abdomen Soft, tender and nondistended, normoactive bowel sounds. No palpable mass. No hepatosplenomegaly. Neuro Pleasantly confused. Oriented to self. MSK Normal range of motion in general.  No edema and no tenderness. Psych Pleasantly confused Labs: 
   
Recent Labs  
  02/17/21 
0558 02/16/21 
0559 02/15/21 
4565 WBC 10.4 11.2* 11.6*  
RBC 4.10 4.33 4.39  
HGB 10.7* 11.3* 11.4* HCT 31.7* 33.4* 33.8* MCV 77.3* 77.1* 77.0*  
MCH 26.1 26.1 26.0*  
MCHC 33.8 33.8 33.7 RDW 14.7* 14.7* 14.6 * 539* 632* GRANS 74 71 73 LYMPH 16 18 19 MONOS 9 10 7 EOS 0* 0* 0*  
BASOS 0 0 0 IG 1 1 1 DF AUTOMATED AUTOMATED AUTOMATED ANEU 7.7 8.0 8.5* ABL 1.7 2.1 2.2 ABM 0.9 1.1 0.8 CITLALY 0.0 0.0 0.0 ABB 0.0 0.0 0.0 AIG 0.1 0.1 0.1 Recent Labs  
  02/17/21 
0558 02/16/21 
0559 02/15/21 
0546 * 136 135* K 3.1* 3.6 3.6  104 103 CO2 22 22 19* AGAP 10 10 13 GLU 95 95 109* BUN 9 12 9 CREA 0.50* 0.58* 0.56* GFRAA >60 >60 >60 GFRNA >60 >60 >60  
CA 9.0 9.6 9.5 * 269* 276* TP 6.2* 6.4 6.4 ALB 2.4* 2.5* 2.4*  
GLOB 3.8* 3.9* 4.0* AGRAT 0.6* 0.6* 0.6* MG 1.4* 1.7* 1.8 Imaging: 
 
Medications: 
Current Facility-Administered Medications Medication Dose Route Frequency  losartan (COZAAR) tablet 100 mg  100 mg Oral DAILY  naloxone (NARCAN) injection 0.4 mg  0.4 mg IntraVENous PRN  
 senna-docusate (PERICOLACE) 8.6-50 mg per tablet 1 Tab  1 Tab Oral DAILY  alum-mag hydroxide-simeth (MYLANTA) oral suspension 30 mL  30 mL Oral Q4H PRN  potassium chloride (K-DUR, KLOR-CON) SR tablet 20 mEq  20 mEq Oral BID  
 oxyCODONE IR (ROXICODONE) tablet 5 mg  5 mg Oral Q6H PRN  
  ondansetron (ZOFRAN) injection 8 mg  8 mg IntraVENous Q8H PRN  
 hydrALAZINE (APRESOLINE) 20 mg/mL injection 10 mg  10 mg IntraVENous Q6H PRN  
 famotidine (PEPCID) tablet 40 mg  40 mg Oral DAILY  FLUoxetine (PROzac) capsule 60 mg  60 mg Oral QHS  fluticasone propionate (FLONASE) 50 mcg/actuation nasal spray 2 Spray  2 Spray Both Nostrils DAILY  budesonide-formoteroL (SYMBICORT) 160-4.5 mcg/actuation HFA inhaler 2 Puff  2 Puff Inhalation BID RT  
 levothyroxine (SYNTHROID) tablet 88 mcg  88 mcg Oral ACB  pravastatin (PRAVACHOL) tablet 40 mg  40 mg Oral QHS  [Held by provider] traMADoL (ULTRAM) tablet 50 mg  50 mg Oral Q6H PRN  propranolol LA (INDERAL LA) capsule 120 mg  120 mg Oral DAILY  0.9% sodium chloride infusion  75 mL/hr IntraVENous CONTINUOUS  
 enoxaparin (LOVENOX) injection 40 mg  40 mg SubCUTAneous Q24H  
 
 
 
ASSESSMENT: 
 
Problem List  Date Reviewed: 2/12/2021 Codes Class Noted Hypertension ICD-10-CM: I10 
ICD-9-CM: 401.9  2/16/2021 Dehydration ICD-10-CM: E86.0 ICD-9-CM: 276.51  2/12/2021 Confusion ICD-10-CM: R41.0 ICD-9-CM: 298.9  2/12/2021 Failure to thrive in adult ICD-10-CM: R62.7 ICD-9-CM: 783.7  2/12/2021 Abdominal pain ICD-10-CM: R10.9 ICD-9-CM: 789.00  2/12/2021 FTT (failure to thrive) in adult ICD-10-CM: R62.7 ICD-9-CM: 783.7  2/12/2021 Generalized abdominal pain ICD-10-CM: R10.84 ICD-9-CM: 789.07  1/31/2021 Hepatic lesion ICD-10-CM: K76.9 ICD-9-CM: 573.8  1/30/2021 Leukocytosis ICD-10-CM: H81.787 ICD-9-CM: 288.60  1/30/2021 UTI (urinary tract infection) ICD-10-CM: N39.0 ICD-9-CM: 599.0  1/30/2021 Pancreatic lesion ICD-10-CM: K86.9 ICD-9-CM: 577.9  1/30/2021 Elevated LFTs ICD-10-CM: R79.89 ICD-9-CM: 790.6  1/30/2021 Lung nodule ICD-10-CM: R91.1 ICD-9-CM: 793.11  9/23/2020 Dyspepsia ICD-10-CM: R10.13 ICD-9-CM: 536.8  2/6/2019 Cervicalgia ICD-10-CM: M54.2 ICD-9-CM: 723.1  2/6/2019 Seborrheic keratoses, inflamed ICD-10-CM: L82.0 ICD-9-CM: 702.11  8/14/2018 History of cigarette smoking ICD-10-CM: Z87.891 ICD-9-CM: V15.82  7/10/2018 Gastroesophageal reflux disease without esophagitis ICD-10-CM: K21.9 ICD-9-CM: 530.81  5/17/2018 Palpitations ICD-10-CM: R00.2 ICD-9-CM: 785.1  7/25/2017 Microscopic hematuria ICD-10-CM: R31.29 ICD-9-CM: 599.72  6/27/2017 Memory loss ICD-10-CM: R41.3 ICD-9-CM: 780.93  10/5/2016 Breast mass in female ICD-10-CM: N63.0 ICD-9-CM: 611.72  10/27/2015 Fatigue due to depression ICD-10-CM: F32.9, R53.83 ICD-9-CM: 612, 780.79  10/23/2015 Acquired hypothyroidism ICD-10-CM: E03.9 ICD-9-CM: 244.9  10/23/2015 Essential hypertension ICD-10-CM: I10 
ICD-9-CM: 401.9  Unknown Depression, unipolar ([facility] [address] ICD-10-CM: F33.9 ICD-9-CM: 296.20  Unknown Pure hypercholesterolemia ICD-10-CM: E78.00 ICD-9-CM: 272.0  Unknown PLAN: 
Pancreatic body and liver lesion - path from biopsy on 2/2 pending 2/17 check breast tumor markers. patho sending out Caris, ER/[place]/HER2. CSF + atypical cells-IR consulted for IT MTX. MRI spine ordered. Then will need NM bone survey. PMH palpable lump right breast 3/2020 
-second look mammogram  demonstrated no definite focal abnormality, normal-appearing breast tissue in the right breast 6-7 o'clock positions. Abdominal pain  
- KUB negative, but did note? Foreign body in the left upper abdomen, but unsure if intraabdominal or exterior. Dr. Awanda Hillock personally reviewed images. Reviewed palliative care recommendations for pain management. They stated could trial oxycodone 5 mg vs dilaudid 2 mg. Will try oxycodone 5 mg PRN. Will hold tramadol and Morphine in light of possible AMS. 2/14 some abdominal pain this AM, but she wishes to hold off on pain medication at this time. Hallucinations/Confusion 
-Brain MRI pending 
-Hold Morphine and Tramadol. Try Oxycodone. 2/14 MRI of the brain with some pachymeningeal thickening and enhancement of the right convexity. Infection like meningitis is possible and intracranial hypotension is less likely. 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 Will order LP for tomorrow with cytology. Of note, patient notes that she has had a LP in the past with Dr. Keyana Hays, will see if we can obtain records. 2/15 IR consulted for LP. Check ammonia. Consult neuro. 2/16 Tele neuro consult this am-concerns for brain carcinoma. Recommend CT brain wo contrast 
 
Opioid induced constipation 
- Since KUB negative, Palliative care recommended lactulose TID x 3 doses and then Pericolace 2 tablets daily - BID. 2/14 on lactulose TID x 3 doses. Hypertension 2/16 cardiology consulted now on losartan Tobacco abuse 2/17 smoker for 40+ years attempting cessation in November 2020 All questions answered to the best of our ability. Amber Snider NP [facility] Hematology and Oncology/Radiation Oncology [address] [place] [address] Office : [phone] Fax : [phone]  
 
  
Attending Addendum: I have personally performed a face to face diagnostic evaluation on this patient. I have reviewed and agree with the care plan as documented above by Sanaz Solis N.P.  My findings are as follows: Patient appears lethargic, heart rate regular without murmurs, abdomen is non-tender, bowel sounds are positive.  71 female now with hilar mass, pancreatic body, bones and liver lesions: Liver lesion biopsy results discussed with pathology, though not specific, results most consistent with breast primary.  Baseline confusion continues. S/p LP, CSF fluid w atypical cells. 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 Will get breast tumor markers, and bone scan. We will also get MRI spine given suspected leptomeningeal disease. Plan for IT methotrexate as well.  
  
     
  
 
  
Chon Michael MD 
[facility] Hematology/Oncology [address] [place] [address] Office : [phone] Fax : [phone]

## 2021-02-18 NOTE — PROGRESS NOTES
TRANSFER - IN REPORT: 
 
Verbal report received from [address] [place] [address], RN(name) on Adrienne Frankel  being received from IR(unit) for ordered procedure Report consisted of patients Situation, Background, Assessment and  
Recommendations(SBAR). Information from the following report(s) Procedure Summary and MAR was reviewed with the receiving nurse. Opportunity for questions and clarification was provided. Assessment completed upon patients arrival to unit and care assumed. Awaiting pickup from Ashlee Santiago

## 2021-02-18 NOTE — PROGRESS NOTES
TRANSFER - OUT REPORT:    Verbal report given to janneth rn(name) on Marv Gay  being transferred to [facility](unit) for ordered procedure       Report consisted of patients Situation, Background, Assessment and   Recommendations(SBAR). Information from the following report(s) SBAR was reviewed with the receiving nurse. Lines:   Peripheral IV 02/12/21 Left; Lower Arm (Active)   Site Assessment Clean, dry, & intact 02/18/21 0810   Phlebitis Assessment 0 02/18/21 0810   Infiltration Assessment 0 02/18/21 0810   Dressing Status Clean, dry, & intact 02/18/21 0810   Dressing Type Tape;Transparent 02/18/21 0810   Hub Color/Line Status Patent; Flushed 02/18/21 0810   Alcohol Cap Used No 02/18/21 0810        Opportunity for questions and clarification was provided.       Patient transported with:   [employer]

## 2021-02-18 NOTE — PROGRESS NOTES
PT 
I've been in to see pt. 3 times this am and she is dtn in IR. Will check back if time allows, otherwise will see her tomorrow.  
 
Elisa Baer, [place]

## 2021-02-18 NOTE — PROGRESS NOTES
[facility] Hematology & Oncology Inpatient Hematology / Oncology Progress Note Admission Date: 2021 10:08 PM 
Reason for Admission/Hospital Course: FTT (failure to thrive) in adult [R62.7] Abdominal pain [R10.9] Confusion [R41.0] 24 Hour Events:  
IT MTX today CA 15.3  1,032.50 
CA 27.29  2,817.3 ROS: 
Constitutional: negative for fever, chills, weakness, malaise, fatigue. CV:  negative for chest pain, palpitations, edema. Respiratory: negative for dyspnea, cough, wheezing. GI:  negative for nausea, diarrhea. + for abdominal pain and constipation 10 point review of systems is otherwise negative with the exception of the elements mentioned above in the HPI. Allergies Allergen Reactions  Sulfa (Sulfonamide Antibiotics) Anaphylaxis  Buspar [Buspirone] Vertigo  Pcn [Penicillins] Hives  Wellbutrin [Bupropion Hcl] Other (comments) Constipation OBJECTIVE: 
No data found. Temp (24hrs), Av.1 °F (36.7 °C), Min:97.5 °F (36.4 °C), Max:98.9 °F (37.2 °C) No intake/output data recorded. Physical Exam: 
Constitutional: Well developed, well nourished female in no acute distress, sitting comfortably in the hospital bed. HEENT: Normocephalic and atraumatic. Oropharynx is clear, mucous membranes are moist.  Pupils are equal, round, and reactive to light. Extraocular muscles are intact. Sclerae anicteric. Lymph node Deferred Skin Warm and dry. No bruising and no rash noted. No erythema. No pallor. Respiratory Lungs are clear to auscultation bilaterally without wheezes, rales or rhonchi, normal air exchange without accessory muscle use. CVS Normal rate, regular rhythm and normal S1 and S2. No murmurs, gallops, or rubs. Abdomen Soft, tender and nondistended, normoactive bowel sounds. No palpable mass. No hepatosplenomegaly. Neuro Pleasantly confused. Oriented to self. MSK Normal range of motion in general.  No edema and no tenderness. Psych Pleasantly confused Labs: 
   
Recent Labs  
  02/18/21 
0507 02/17/21 
0750 02/16/21 
0559 WBC 11.7* 10.4 11.2*  
RBC 4.15 4.10 4.33  
HGB 10.9* 10.7* 11.3* HCT 32.0* 31.7* 33.4* MCV 77.1* 77.3* 77.1*  
MCH 26.3 26.1 26.1 MCHC 34.1 33.8 33.8  
RDW 14.9* 14.7* 14.7*  
* 455* 539* GRANS 72 74 71 LYMPH 17 16 18 MONOS 10 9 10 EOS 0* 0* 0*  
BASOS 0 0 0 IG 1 1 1 DF AUTOMATED AUTOMATED AUTOMATED ANEU 8.4* 7.7 8.0 ABL 2.0 1.7 2.1 ABM 1.2 0.9 1.1 CITLALY 0.0 0.0 0.0 ABB 0.0 0.0 0.0 AIG 0.1 0.1 0.1 Recent Labs  
  02/18/21 
0507 02/17/21 
0558 02/16/21 
0559  135* 136  
K 3.1* 3.1* 3.6  103 104 CO2 22 22 22 AGAP 12 10 10 * 95 95 BUN 9 9 12 CREA 0.51* 0.50* 0.58* GFRAA >60 >60 >60 GFRNA >60 >60 >60  
CA 9.1 9.0 9.6 * 266* 269* TP 6.2* 6.2* 6.4 ALB 2.4* 2.4* 2.5*  
GLOB 3.8* 3.8* 3.9* AGRAT 0.6* 0.6* 0.6* MG 1.6* 1.4* 1.7* Imaging: 
 
Medications: 
Current Facility-Administered Medications Medication Dose Route Frequency  magnesium oxide (MAG-OX) tablet 400 mg  400 mg Oral TID  senna-docusate (PERICOLACE) 8.6-50 mg per tablet 2 Tab  2 Tab Oral BID  amLODIPine (NORVASC) tablet 5 mg  5 mg Oral DAILY  losartan (COZAAR) tablet 100 mg  100 mg Oral DAILY  naloxone (NARCAN) injection 0.4 mg  0.4 mg IntraVENous PRN  
 alum-mag hydroxide-simeth (MYLANTA) oral suspension 30 mL  30 mL Oral Q4H PRN  potassium chloride (K-DUR, KLOR-CON) SR tablet 20 mEq  20 mEq Oral BID  
 oxyCODONE IR (ROXICODONE) tablet 5 mg  5 mg Oral Q6H PRN  
 ondansetron (ZOFRAN) injection 8 mg  8 mg IntraVENous Q8H PRN  
 hydrALAZINE (APRESOLINE) 20 mg/mL injection 10 mg  10 mg IntraVENous Q6H PRN  
 famotidine (PEPCID) tablet 40 mg  40 mg Oral DAILY  FLUoxetine (PROzac) capsule 60 mg  60 mg Oral QHS  fluticasone propionate (FLONASE) 50 mcg/actuation nasal spray 2 Spray  2 Spray Both Nostrils DAILY  budesonide-formoteroL (SYMBICORT) 160-4.5 mcg/actuation HFA inhaler 2 Puff  2 Puff Inhalation BID RT  
 levothyroxine (SYNTHROID) tablet 88 mcg  88 mcg Oral ACB  pravastatin (PRAVACHOL) tablet 40 mg  40 mg Oral QHS  [Held by provider] traMADoL (ULTRAM) tablet 50 mg  50 mg Oral Q6H PRN  propranolol LA (INDERAL LA) capsule 120 mg  120 mg Oral DAILY  0.9% sodium chloride infusion  75 mL/hr IntraVENous CONTINUOUS  
 enoxaparin (LOVENOX) injection 40 mg  40 mg SubCUTAneous Q24H Facility-Administered Medications Ordered in Other Encounters Medication Dose Route Frequency  lidocaine (XYLOCAINE) 20 mg/mL (2 %) injection  mg  1-20 mL IntraDERMal ONCE  
 methotrexate (PF) 12 mg in 0.9% sodium chloride 5 mL chemo syringe  12 mg Intrathecal ONCE  
 
 
 
ASSESSMENT: 
 
Problem List  Date Reviewed: 2/12/2021 Codes Class Noted Hypertension ICD-10-CM: I10 
ICD-9-CM: 401.9  2/16/2021 Dehydration ICD-10-CM: E86.0 ICD-9-CM: 276.51  2/12/2021 Confusion ICD-10-CM: R41.0 ICD-9-CM: 298.9  2/12/2021 Failure to thrive in adult ICD-10-CM: R62.7 ICD-9-CM: 783.7  2/12/2021 Abdominal pain ICD-10-CM: R10.9 ICD-9-CM: 789.00  2/12/2021 FTT (failure to thrive) in adult ICD-10-CM: R62.7 ICD-9-CM: 783.7  2/12/2021 Generalized abdominal pain ICD-10-CM: R10.84 ICD-9-CM: 789.07  1/31/2021 Hepatic lesion ICD-10-CM: K76.9 ICD-9-CM: 573.8  1/30/2021 Leukocytosis ICD-10-CM: D09.181 ICD-9-CM: 288.60  1/30/2021 UTI (urinary tract infection) ICD-10-CM: N39.0 ICD-9-CM: 599.0  1/30/2021 Pancreatic lesion ICD-10-CM: K86.9 ICD-9-CM: 577.9  1/30/2021 Elevated LFTs ICD-10-CM: R79.89 ICD-9-CM: 790.6  1/30/2021 Lung nodule ICD-10-CM: R91.1 ICD-9-CM: 793.11  9/23/2020 Dyspepsia ICD-10-CM: R10.13 ICD-9-CM: 536.8  2/6/2019 Cervicalgia ICD-10-CM: M54.2 ICD-9-CM: 723.1  2/6/2019 Seborrheic keratoses, inflamed ICD-10-CM: L82.0 ICD-9-CM: 702.11  8/14/2018 History of cigarette smoking ICD-10-CM: Z87.891 ICD-9-CM: V15.82  7/10/2018 Gastroesophageal reflux disease without esophagitis ICD-10-CM: K21.9 ICD-9-CM: 530.81  5/17/2018 Palpitations ICD-10-CM: R00.2 ICD-9-CM: 785.1  7/25/2017 Microscopic hematuria ICD-10-CM: R31.29 ICD-9-CM: 599.72  6/27/2017 Memory loss ICD-10-CM: R41.3 ICD-9-CM: 780.93  10/5/2016 Breast mass in female ICD-10-CM: N63.0 ICD-9-CM: 611.72  10/27/2015 Fatigue due to depression ICD-10-CM: F32.9, R53.83 ICD-9-CM: 432, 780.79  10/23/2015 Acquired hypothyroidism ICD-10-CM: E03.9 ICD-9-CM: 244.9  10/23/2015 Essential hypertension ICD-10-CM: I10 
ICD-9-CM: 401.9  Unknown [facility] ([facility] ICD-10-CM: F33.9 ICD-9-CM: 296.20  Unknown Pure hypercholesterolemia ICD-10-CM: E78.00 ICD-9-CM: 272.0  Unknown PLAN: 
Pancreatic body and liver lesion/leptomenigial disease/bone lesions - path from biopsy on 2/2 pending 2/17 check breast tumor markers. patho sending out Caris, ER/[place]/HER2. CSF + atypical cells-IR consulted for IT MTX. MRI spine ordered. Then will need NM bone survey. 2/18 BMbx moderately differentiating adenocarcinoma. IT MTX today. Breast tumor markers elevated. Cervical spine multiple osseous lesions suspicious for metastases. T and L spine pending. Bone scan pending. PMH palpable lump right breast 3/2020 
-second look mammogram  demonstrated no definite focal abnormality, normal-appearing breast tissue in the right breast 6-7 o'clock positions. Abdominal pain - KUB negative, but did note? Foreign body in the left upper abdomen, but unsure if intraabdominal or exterior. Dr. Alysha Lei personally reviewed images. Reviewed palliative care recommendations for pain management. They stated could trial oxycodone 5 mg vs dilaudid 2 mg. Will try oxycodone 5 mg PRN. Will hold tramadol and Morphine in light of possible AMS. 2/14 some abdominal pain this AM, but she wishes to hold off on pain medication at this time. Hallucinations/Confusion 
-Brain MRI pending 
-Hold Morphine and Tramadol. Try Oxycodone. 2/14 MRI of the brain with some pachymeningeal thickening and enhancement of the right convexity. Infection like meningitis is possible and intracranial hypotension is less likely. 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 Will order LP for tomorrow with cytology. Of note, patient notes that she has had a LP in the past with Dr. Teja Olivera, will see if we can obtain records. 2/15 IR consulted for LP. Check ammonia. Consult neuro. 2/16 Tele neuro consult this am-concerns for brain carcinoma. Recommend CT brain wo contrast 
 
Opioid induced constipation 
- Since KUB negative, Palliative care recommended lactulose TID x 3 doses and then Pericolace 2 tablets daily - BID. 2/14 on lactulose TID x 3 doses. Hypertension 2/16 cardiology consulted now on losartan, propanolol. Now also on amlodipine 5 mg daily. Tobacco abuse 2/17 smoker for 40+ years attempting cessation in November 2020 All questions answered to the best of our ability. Ximena Leblanc NP [facility] Hematology and Oncology/Radiation Oncology [address] [address] Office : [phone] Fax : [phone] Attending Addendum: 
 I have reviewed and agree with the care plan as documented above by Ximena Leblanc N.P.  
 
 
 
Jeff Dodd MD 
[facility] Hematology/Oncology [address] [address] Office : [phone] Fax : [phone]

## 2021-02-18 NOTE — PROGRESS NOTES
1800-END OF SHIFT NOTE: 
 
-IT MTX received today  
-MRI (thoracic spine, lumb spine) done today  
-bone scan scheduled tomorrow  
-vss, no needs voiced at this time Intake/Output 02/18 0701 - 02/18 1900 In: 46 [P.O.:100; I.V.:357] Out: 500 [Urine:500] Voiding: YES Catheter: NO 
Drain:   
 
 
 
Stool:  1 occurrences. Stool Assessment Stool Color: Betsy Fore (02/18/21 1633) Stool Appearance: Loose (02/18/21 1633) Stool Amount: Small (02/18/21 1633) Stool Source/Status: Rectum (02/18/21 1633) Emesis:  0 occurrences. VITAL SIGNS Patient Vitals for the past 12 hrs: 
 Temp Pulse Resp BP SpO2  
02/18/21 1632 97.6 °F (36.4 °C) 80 18 (!) 145/76 99 % 02/18/21 1355 97.7 °F (36.5 °C) 72 18 (!) 153/72 100 % 02/18/21 1341 97.5 °F (36.4 °C) 75 18 (!) 169/81 99 % Pain Assessment Pain 1 Pain Scale 1: Numeric (0 - 10) (02/18/21 0715) Pain Intensity 1: 0 (02/18/21 0715) Patient Stated Pain Goal: 0 (02/18/21 0715) Pain Reassessment 1: Yes (02/17/21 2025) Pain Onset 1: ongoing (02/17/21 0825) Pain Location 1: Back; Abdomen (02/17/21 0825) Pain Orientation 1: Mid (02/17/21 0825) Pain Description 1: Constant (02/17/21 0825) Pain Intervention(s) 1: Medication (see MAR) (02/17/21 0825) Ambulating Yes Additional Information:  
 
Shift report given to oncoming nurse at the bedside.  
 
Ziggy Maher RN

## 2021-02-18 NOTE — PROGRESS NOTES
[facility] CARDIOLOGY PROGRESS NOTE 
      
 
2/17/2021 6:29 PM 
 
Admit Date: 2/12/2021 Subjective:  
 
No o/e; improved confusion. Alert and appropriately responds to questions. Denies any pain ROS: 
Cardiovascular:  As noted above Objective:  
  
Vitals:  
 02/17/21 1120 02/17/21 1648 02/17/21 1702 02/17/21 1911 BP: (!) 176/72 (!) 191/92 (!) 181/87 (!) 150/72 Pulse: 71 71  82 Resp: 18 18  18 Temp: 97.9 °F (36.6 °C) 97.5 °F (36.4 °C)  98.9 °F (37.2 °C) SpO2: 99% 100%  99% Weight:      
Height:      
 
 
Physical Exam: 
General-No Acute Distress Neck- supple, no JVD 
CV- regular rate and rhythm no MRG Lung- clear bilaterally Abd- soft, nontender, nondistended Ext- no edema bilaterally. Skin- warm and dry Data Review:  
Recent Labs  
  02/17/21 
0558 02/16/21 
0559 * 136  
K 3.1* 3.6 MG 1.4* 1.7*  
BUN 9 12 CREA 0.50* 0.58* GLU 95 95 WBC 10.4 11.2* HGB 10.7* 11.3* HCT 31.7* 33.4*  
* 539* Assessment/Plan: Active Problems: 
  Pancreatic lesion (1/30/2021) -Work-up per hematology/oncology Generalized abdominal pain (1/31/2021) -Improved. Confusion (2/12/2021) -improved Failure to thrive in adult (2/12/2021) FTT (failure to thrive) in adult (2/12/2021) Hypertension (2/16/2021) -Losartan increased to 100 mg daily. Also on propranolol. Add on amlodipine 5 mg daily. Can increase dose as needed. 
-Echo with preserved EF Marty Hdez MD 
2/17/2021 6:29 PM

## 2021-02-18 NOTE — PROCEDURES
Department of Interventional Radiology  [phone]        Interventional Radiology Brief Procedure Note    Patient: Jennifer Metz MRN: [id number]  SSN: [id number]    YOB: 1955  Age: 76 y.o. Sex: female      Date of Procedure: 2/18/2021    Pre-Procedure Diagnosis: AMS, pancreatic mass    Post-Procedure Diagnosis: SAME    Procedure(s): Lumbar Puncture, IT chemo    Brief Description of Procedure: fluoro guided LP, specimen obtained, methotrexate injected.     Performed By: Norman Suarez PA-C     Assistants: None    Anesthesia:Lidocaine    Estimated Blood Loss: None    Specimens:  CSF    Implants:  None    Findings: n/a    Complications: None    Recommendations: bedrest     Follow Up: referring team    Signed By: Norman Suarez PA-C     February 18, 2021

## 2021-02-19 PROBLEM — E43 SEVERE PROTEIN-CALORIE MALNUTRITION (HCC): Status: ACTIVE | Noted: 2021-01-01

## 2021-02-19 NOTE — PROGRESS NOTES
Comprehensive Nutrition Assessment Type and Reason for Visit: Initial, RD nutrition re-screen/LOS 
LOS Day 7 Nutrition Recommendations/Plan:  
? Continue with current diet ? Add ensure enlive with all meals Malnutrition Assessment: 
Malnutrition Status: Severe malnutrition Context: Chronic illness Findings of clinical characteristics of malnutrition:  
Energy Intake:  7 - 75% or less est energy requirements for 1 month or longer Weight Loss:  7.0 - Greater than 7.5% over 3 months Fluid Accumulation:  No significant fluid accumulation,   
 Strength:  Not performed Nutrition Assessment:  
Nutrition History: From 2/1-  helps with history. He states patient only really tolerating liquids since just after Thanksgiving. He reports mostly water, Pedialyte, chicken and stars soup, broth. She has recently started using CIB or Boost. He states if she does eat anything solid it is typically just a bite or 2. Additionally 2/19- patient reports she is able to eat some more options but has very dry mouth and prefers liquids overall easier. Cultural/Episcopal/Ethnic Food Preference(s): None Nutrition Background:   
Daily Update: 
Spoke with patient who started off able to answer all my questions before she started to talk about random topics and people. She reports she is eating a bit better than 2 weeks ago when it was basically liquids only, but she still prefers liquids due to dry mouth. She also reports she is having altered taste which affects what she will eat. She reports some nausea but very mild. Lunch was delivered during visit and patient seemed very uninterested in food. She was willing to accept ensure enlive. Current Nutrition Therapies: DIET REGULAR Current Intake: Average Meal Intake: 1-25% Average Supplement Intake: None ordered Anthropometric Measures: 
Height: 4' 11\" (149.9 cm) Current Body Wt: 57.8 kg (127 lb 6.8 oz), Weight source: Bed scale BMI: 25.7, Overweight (BMI 25.0-29. 9) Ideal Body Weight (lbs) (Calculated): 95 lbs (43 kg), 134.1 % Usual Body Wt: 65.3 kg (143 lb 15.4 oz)(per review of outpt office weights), Percent weight change: -11.5 Estimated Daily Nutrient Needs: 
Energy (kcal/day): 1058-3239 (Kcal/kg(25-30), Weight Used: Current(57.8 kg)) Protein (g/day): 58-75 (1-1.3 g/kg) Weight Used: (Current) Fluid (ml/day):   (1 ml/kcal) Nutrition Diagnosis:  
· Inadequate protein-energy intake related to catabolic illness(hallucinations) as evidenced by intake 0-25%, weight loss, poor intake prior to admission · Severe malnutrition, In context of chronic illness related to inadequate protein-energy intake as evidenced by weight loss 7.5% in 3 months, intake 0-25% Nutrition Interventions:  
Food and/or Nutrient Delivery: Continue current diet, Start oral nutrition supplement(Ensure Enlive with all meals) Coordination of Nutrition Care: Continue to monitor while inpatient Plan of Care discussed with Chloe Fernando Goals: Active Goal: Intake >75% of nutritional need within 7 days Nutrition Monitoring and Evaluation:  
  
Food/Nutrient Intake Outcomes: Food and nutrient intake, Supplement intake Discharge Planning:   
Continue oral nutrition supplement Electronically signed by Megan Ascencio MS, RD, LD on 2/19/2021 at 12:54 PM. Contact: [phone] Disaster Mode active

## 2021-02-19 NOTE — PROGRESS NOTES
Problem: Mobility Impaired (Adult and Pediatric) Goal: *Acute Goals and Plan of Care (Insert Text) Description: STG: 
(1.)Ms. Noel Bell will move from supine to sit and sit to supine  with STAND BY ASSIST within 4-7 treatment day(s). (2.)Ms. Noel Bell will transfer from bed to chair and chair to bed with CONTACT GUARD ASSIST using the least restrictive device within 4-7 treatment day(s). (3.)Ms. Noel Bell will ambulate with CONTACT GUARD ASSIST for 100 feet with the least restrictive device within 4-7 treatment day(s). ________________________________________________________________________________________________ Outcome: Progressing Towards Goal 
  
PHYSICAL THERAPY: Daily Note and AM 2/19/2021 INPATIENT: PT Visit Days : 3 Payor: Marita Neil / Plan: [address] [place] [address] HMO / Product Type: Managed Care Medicare /   
  
NAME/AGE/GENDER: Kayla Miranda is a 76 y.o. female PRIMARY DIAGNOSIS: FTT (failure to thrive) in adult [R62.7] Abdominal pain [R10.9] Confusion [R41.0] <principal problem not specified> <principal problem not specified> 
 
  
ICD-10: Treatment Diagnosis:  
 · Generalized Muscle Weakness (M62.81) · Difficulty in walking, Not elsewhere classified (R26.2) Precaution/Allergies: 
Sulfa (sulfonamide antibiotics), Buspar [buspirone], Pcn [penicillins], and Wellbutrin [bupropion hcl] ASSESSMENT:  
 
 Ms. Torrie Tenorio presents supine on contact, she was admitted with above diagnosis. RN and SW stated that in addition to her bone lesions she also has brain mets. She does present with confusion as well but answers questions and follows commands. She would ask a lot of questions as well like what do ou want me to. She lives at home with her spouse and used a RW. She presents with generalized weakness and decreased independence with functional mobility. She will benefit from skilled PT interventions to maximize independence with functional mobility and strengthening. Worked on bed mobility and sit to stand. She appears to be uncomfortable in her back with mobility. Took some side steps at head of bed and returned to sitting. Sit to stand and again and worked on some steps with RW. Pt having some difficulty navigating walker and ended up helping her with hand held assist over to a wheelchair. Transport took pt to CT. Hope to progress at next therapy session. 2/17 AM; She is still a bit confused. When using the RW,she requires tactile cueing to stay inside RW during turns and backing up. Assisted to restroom. Ambulates around room, then stays up in recliner on alarm pad and completes exs. Recommend supervision at [place] to insure safe adjustment to home. The walker that is in her room is too tall for her. Could benefit from [facility]. Will speak to SW 
2/19 AM: in bed. Willing to participate. She performs LE exs, then ambulates in room. Began to fell dizzy, se we turned around and went to recliner. She did appear to have some difficulty placing R hand on walker handle. Don't know if it perceptual issues. Stayed up in chair. CNA in room This section established at most recent assessment PROBLEM LIST (Impairments causing functional limitations): 1. Decreased Strength 2. Decreased ADL/Functional Activities 3. Decreased Transfer Abilities 4. Decreased Ambulation Ability/Technique 5. Decreased Balance INTERVENTIONS PLANNED: (Benefits and precautions of physical therapy have been discussed with the patient.) 1. Balance Exercise 2. Bed Mobility 3. Gait Training 4. Therapeutic Activites 5. Therapeutic Exercise/Strengthening 6. Transfer Training TREATMENT PLAN: Frequency/Duration: daily for duration of hospital stay Rehabilitation Potential For Stated Goals: Good REHAB RECOMMENDATIONS (at time of discharge pending progress):   
Placement: It is my opinion, based on this patient's performance to date, that Ms. Tom Omer may benefit from [address] after discharge due to the functional deficits listed above that are likely to improve with skilled rehabilitation because he/she has multiple medical issues that affect his/her functional mobility in the community. Equipment: ? To be determined Could benefit from [facility]  
    
 
 
 
HISTORY:  
History of Present Injury/Illness (Reason for Referral): PER MD NOTE: Ms Tom Omer is a 70yo woman with PMHx of HLD, HTN, vertigo, anxiety and depression with recent hx of abdominal pain/N/V. Her s/s started around Thanksgiving. She notes weight loss of ~20ls since then. She was recently seen at [facility]. US showed 1.8cm lesion in the pancreatic body and liver lesion in the right lobe. She was recommended to undergo abd MRI. She was recently admitted at [facility] and MRI showed multiple liver lesions concerning for metastatic disease. She underwent bx on 2/2 and path is pending. Reviewed with path today await IHCs. Her imaging also showed bone lesions in spine and pelvis. Subsequent CT chest from 2/9 showed lung lesions. She was evaluated by Dr Luis Enrique Kiser and plan was to p/w EBUS if liver bx confirms pancreatic source to eval for second primary. Today, she was seen at [facility] for [place]. Unfortunately, she has gotten progressively worse since d/c. Her  is here with her and reported continued decline. She has hallucinated at home. She is on pain medications. This is somewhat improved since tapering morphine. She has significant abd pain and no BM for 7 days. + flatus per . No vomiting. She is not eating much. She sleeps most of the day. Admitting for symptom management/pain, r/o brain mets and worsening abd pain (which could be from her disease alone). PC saw pt at [facility]. Sending to [facility] infusion for IVF/labs first as she is awaiting a bed. Past Medical History/Comorbidities: Ms. Rand Porter  has a past medical history of Anxiety state, unspecified, Dyslipidemia, HTN (hypertension), Major depressive disorder, recurrent episode, in partial or unspecified remission, Tobacco abuse, and Vertigo. Ms. Rand Porter  has a past surgical history that includes hx tonsillectomy (1995); hx total abdominal hysterectomy (2000); hx bladder suspension (2000); hx tubal ligation (1992); hx dilation and curettage (1999); hx colonoscopy (2006, 2011); and ir spinal puncture csf treat / drain (2/18/2021). Social History/Living Environment:  
Home Environment: Private residence # Steps to Enter: 0 One/Two Story Residence: One story Living Alone: No 
Support Systems: Family member(s), Spouse/Significant Other/Partner Patient Expects to be Discharged to[de-identified] Private residence Current DME Used/Available at Home: Walker, rolling Prior Level of Function/Work/Activity: 
Pt living at home with her spouse, used a walker for mobility Number of Personal Factors/Comorbidities that affect the Plan of Care: 1-2: MODERATE COMPLEXITY EXAMINATION:  
Most Recent Physical Functioning:  
Gross Assessment: 
 grossly 3+ B LEs Posture: 
  
Balance: 
Sitting: Intact Standing: Pull to stand; With support Bed Mobility: 
Supine to Sit: Contact guard assistance Wheelchair Mobility: 
  
Transfers: 
Sit to Stand: Minimum assistance Stand to Sit: Contact guard assistance Bed to Chair: Minimum assistance Duration: 15 Minutes Gait: 
  
Speed/Keiko: Pace decreased (<100 feet/min) Gait Abnormalities: Decreased step clearance; Path deviations Distance (ft): 30 Feet (ft)(started feeling dizzy) Assistive Device: Walker, rolling Ambulation - Level of Assistance: Minimal assistance Interventions: Safety awareness training; Tactile cues; Verbal cues; Visual/Demos Body Structures Involved: 1. Muscles Body Functions Affected: 1. Movement Related Activities and Participation Affected: 1. General Tasks and Demands 2. Mobility 3. Self Care Number of elements that affect the Plan of Care: 4+: HIGH COMPLEXITY CLINICAL PRESENTATION:  
Presentation: Stable and uncomplicated: LOW COMPLEXITY CLINICAL DECISION MAKIN[address] [place] [id number] AM-PAC 6 Clicks Basic Mobility Inpatient Short Form How much difficulty does the patient currently have. .. Unable A Lot A Little None 1. Turning over in bed (including adjusting bedclothes, sheets and blankets)? [] 1   [] 2   [x] 3   [] 4  
2. Sitting down on and standing up from a chair with arms ( e.g., wheelchair, bedside commode, etc.)   [] 1   [] 2   [x] 3   [] 4  
3. Moving from lying on back to sitting on the side of the bed? [] 1   [] 2   [x] 3   [] 4 How much help from another person does the patient currently need. .. Total A Lot A Little None 4. Moving to and from a bed to a chair (including a wheelchair)? [] 1   [] 2   [x] 3   [] 4  
5. Need to walk in hospital room? [] 1   [] 2   [x] 3   [] 4  
6. Climbing 3-5 steps with a railing? [] 1   [] 2   [x] 3   [] 4  
© , Trustees of [address] [place] [address] [address] [address], under license to [employer]. All rights reserved Score:  Initial: 18 Most Recent: X (Date: -- ) Interpretation of Tool:  Represents activities that are increasingly more difficult (i.e. Bed mobility, Transfers, Gait). Medical Necessity:    
· Patient is expected to demonstrate progress in  
· strength and functional technique ·  to  
· decrease assistance required with functional mobility and strengthening · . Reason for Services/Other Comments: 
· Patient continues to require skilled intervention due to  
· Inability to complete functional mobility independently · . Use of outcome tool(s) and clinical judgement create a POC that gives a: Clear prediction of patient's progress: LOW COMPLEXITY  
  
 
 
 
TREATMENT:  
(In addition to Assessment/Re-Assessment sessions the following treatments were rendered) Pre-treatment Symptoms/Complaints:Soem dizziness once ambulating Pain: Initial:  
Pain Intensity 1: 0  Post Session: 0 Therapeutic Exercise: (10 Minutes):  Exercises per grid below to improve mobility, strength, coordination and endurance. Required minimal visual, verbal and tactile cues to promote proper body alignment and promote proper body breathing techniques. Progressed resistance, range, repetitions and complexity of movement as indicated. Date: 
2/17/21 B Es Date: 
2/19/21 B LEs Date: Activity/Exercise Parameters Parameters Parameters RECLINED: ankle pumps 10 15 Hip AB/AD 10 15 Heel slides 10 15 SLR 10 15 SHldr flex 10 15 Elbow flex 10 15 Therapeutic Activity: (  15 Minutes ):  Therapeutic activities including bed mobility and bed transfers, Chair transfers, and Ambulation on level ground to improve mobility, strength, balance, and coordination. Required minimal verbal cues to stay on task and for safetySafety awareness training; Tactile cues; Verbal cues; Visual/Demos to promote static and dynamic balance in standing. Braces/Orthotics/Lines/Etc:  
· O2 Device: Room air Treatment/Session Assessment: · Response to Treatment: dizziness this am with ambulation · Interdisciplinary Collaboration:  
o Physical Therapist 
o Registered Nurse 
o Certified Nursing Assistant/Patient Care Technician · After treatment position/precautions:  
o Up in chair 
o Bed alarm/tab alert on 
o Bed/Chair-wheels locked 
o Call light within reach 
o CNA at bedside · Compliance with Program/Exercises: Will assess as treatment progresses · Recommendations/Intent for next treatment session: \"Next visit will focus on advancements to more challenging activities and reduction in assistance provided\". Total Treatment Duration: PT Patient Time In/Time Out Time In: 0830 Time Out: 7860 Dre Crowe PT

## 2021-02-19 NOTE — PROGRESS NOTES
Sw reviewed chart and talked with Onc RNP this am. Onc planning discharge for tomorrow. Md to talk with pt's  to inform of extensiveness of pt's medical condition. Pt still with confusion.  Pt worked with PT this am.

## 2021-02-19 NOTE — DISCHARGE SUMMARY
[employer] [facility] Hematology & Oncology: Inpatient Hematology / Oncology Discharge Summary Note Patient ID: 
Joshua Hennessy [id number] 
89 y.o. 
1955 Admit Date: 2/12/2021 Discharge Date: 2/23/2021 Admission Diagnoses: FTT (failure to thrive) in adult [R62.7] Abdominal pain [R10.9] Confusion [R41.0] Discharge Diagnoses: 
Principal Diagnosis: <principal problem not specified> Active Problems: 
  Pancreatic lesion (1/30/2021) Generalized abdominal pain (1/31/2021) Confusion (2/12/2021) Failure to thrive in adult (2/12/2021) Abdominal pain (2/12/2021) FTT (failure to thrive) in adult (2/12/2021) Hypertension (2/16/2021) Severe protein-calorie malnutrition ([facility] [address] (2/19/2021) Hospital Course: Ms Gladis Araya is a 72yo woman with PMHx of HLD, HTN, vertigo, anxiety and depression with recent hx of abdominal pain/N/V.  Her s/s started around Thanksgiving.  She notes weight loss of ~20ls since then. Kye Hess was recently seen at [facility]. US showed 1.8cm lesion in the pancreatic body and liver lesion in the right lobe.  She was recommended to undergo abd MRI.  She was recently admitted at [facility] and MRI showed multiple liver lesions concerning for metastatic disease. She underwent bx on 2/2. Her imaging also showed bone lesions in spine and pelvis. Subsequent CT chest from 2/9 showed lung lesions.   She was evaluated by Dr Luis Eduardo Curtis and plan was to p/w EBUS if liver bx confirms pancreatic source to eval for second primary.   
  
 She was seen at [facility] for [place] on 2/12/2020, and unfortunately, she had gotten progressively worse since d/c. She was hallucinating at home. She had significant abd pain and no BM for 7 days. She was not eating much. She was sleeping most of the day. Admitted 2/12 for symptom management/pain, r/o brain mets and worsening abd pain. Abdominal discomfort continues but is tolerable without pain medication. Liver biopsy +mod diff adenocarcinoma with suspicion of breast primary (further send outs pending). Spine MRI shows  extensive osseous mets. NM bone survey large area of abnormal uptake in the bilateral femora (including the left femoral neck) and moderate area of posterior right calvarium are suspicious for metastatic disease. Known osseous lesions of the vertebrae seen on MRI do not show significant uptake. Possible involvement of the right ischial tuberosity which may be lesion or degenerative change. . Brain MRI: Mild smooth thin pachymeningeal thickening and enhancement of the right convexity, is indeterminant this can represent metastatic disease. Infection such as meningitis could appear similar in the correct clinical context. Neurology consulted diagnosed with leptomenigial disease. She had IT MTX 2/18. She remains confused with no sign of improvement. Imaging and prognosis was reviewed with spouse. He has decided to forego treatment and wants to take her home with hospice support.  
  
Consults: 
IP CONSULT TO PALLIATIVE CARE - PROVIDER 
IP CONSULT TO NEUROLOGY 
IP CONSULT TO INTERVENTIONAL RADIOLOGY 
IP CONSULT TO CARDIOLOGY Pertinent Diagnostic Studies:  
Labs:   
Recent Labs  
  02/23/21 
0531 02/22/21 
0510 02/21/21 
0459 WBC 9.0 7.5 9.3 HGB 12.3 11.6* 10.8* * 468* 493* ANEU 7.3 6.6 7.9 Recent Labs  
  02/23/21 
0531 02/22/21 
0510 02/21/21 
0459 * 128* 132* K 4.8 4.3 4.0  
CL 97* 97* 101 CO2 21 22 22 * 128* 107* BUN 18 14 18 CREA 0.50* 0.46* 0.49* CA 9.8 9.3 9.2 * 434* 363* TP 7.1 6.8 6.4 ALB 2.8* 2.7* 2.4* MG 2.2 2.0 2.0 Current Discharge Medication List  
  
START taking these medications Details LORazepam (ATIVAN) 1 mg tablet Take 1 Tab by mouth every six (6) hours as needed for Anxiety. Max Daily Amount: 4 mg. Qty: 30 Tab, Refills: 0 Associated Diagnoses: Anxiety  
  
oxyCODONE IR (ROXICODONE) 5 mg immediate release tablet Take 1 Tab by mouth every six (6) hours as needed for Pain for up to 3 days. Max Daily Amount: 20 mg. 
Qty: 30 Tab, Refills: 0 Associated Diagnoses: Cancer associated pain CONTINUE these medications which have NOT CHANGED Details  
promethazine (PHENERGAN) 25 mg tablet TAKE 1 TABLET EVERY 6 HOURS AS NEEDED FOR NAUSEA AND VOMITING  
  
ondansetron (ZOFRAN ODT) 4 mg disintegrating tablet Take 1 Tab by mouth every eight (8) hours as needed for Nausea or Vomiting. Qty: 60 Tab, Refills: 1 Associated Diagnoses: Abdominal mass, unspecified abdominal location  
  
fluticasone-salmeterol (ADVAIR) 250-50 mcg/dose diskus inhaler Take 1 Puff by inhalation two (2) times a day. Qty: 3 Inhaler, Refills: 3  
  
ibuprofen (MOTRIN) 800 mg tablet Take 1 Tab by mouth every eight (8) hours as needed for Pain. Qty: 90 Tab, Refills: 1 Associated Diagnoses: Acute pain of right knee STOP taking these medications  
  
 famotidine (PEPCID) 40 mg tablet Comments:  
Reason for Stopping:   
   
 losartan (COZAAR) 50 mg tablet Comments:  
Reason for Stopping:   
   
 naloxone (NARCAN) 4 mg/actuation nasal spray Comments:  
Reason for Stopping:   
   
 omeprazole (PRILOSEC) 40 mg capsule Comments:  
Reason for Stopping:   
   
 sucralfate (CARAFATE) 1 gram tablet Comments:  
Reason for Stopping:   
   
 levothyroxine (SYNTHROID) 100 mcg tablet Comments:  
Reason for Stopping:   
   
 ergocalciferol (DRISDOL) 50,000 unit capsule Comments:  
Reason for Stopping: pravastatin (PRAVACHOL) 40 mg tablet Comments:  
Reason for Stopping:   
   
 propranolol LA (INDERAL LA) 120 mg SR capsule Comments:  
Reason for Stopping: FLUoxetine (PROZAC) 20 mg capsule Comments:  
Reason for Stopping:   
   
 hydroCHLOROthiazide (HYDRODIURIL) 25 mg tablet Comments:  
Reason for Stopping: CYANOCOBALAMIN, VITAMIN B-12, (VITAMIN B-12 PO) Comments:  
Reason for Stopping:   
   
 magnesium oxide 500 mg tab Comments:  
Reason for Stopping:   
   
 morphine CR (MS CONTIN) 15 mg CR tablet Comments:  
Reason for Stopping:   
   
 fluticasone (FLONASE) 50 mcg/actuation nasal spray Comments:  
Reason for Stopping: OBJECTIVE: 
Patient Vitals for the past 8 hrs: 
 BP Temp Pulse Resp SpO2  
21 1114 (!) 160/86 97.2 °F (36.2 °C) 78 18 98 % 21 0809     99 % 21 0755 (!) 155/87 97.2 °F (36.2 °C) 78 18 98 % Temp (24hrs), Av.5 °F (36.4 °C), Min:97.2 °F (36.2 °C), Max:98 °F (36.7 °C) No intake/output data recorded. Physical Exam: 
Constitutional: Well developed, well nourished female in no acute distress, sitting comfortably on the bed HEENT: Normocephalic and atraumatic. Oropharynx is clear, mucous membranes are moist.  Pupils are equal, round, and reactive to light. Extraocular muscles are intact. Sclerae anicteric. Skin Warm and dry. No bruising and no rash noted. No erythema. No pallor. Respiratory Lungs are clear to auscultation bilaterally without wheezes, rales or rhonchi, normal air exchange without accessory muscle use. CVS Normal rate, regular rhythm and normal S1 and S2. No murmurs, gallops, or rubs. Abdomen Soft, nontender and nondistended, normoactive bowel sounds. No palpable mass. No hepatosplenomegaly. Neuro Grossly nonfocal with no obvious sensory or motor deficits. MSK Normal range of motion in general.  No edema and no tenderness. Psych Appropriate mood and affect.    
 
 
ASSESSMENT: 
 
 Active Problems: 
  Pancreatic lesion (1/30/2021) Generalized abdominal pain (1/31/2021) Confusion (2/12/2021) Failure to thrive in adult (2/12/2021) Abdominal pain (2/12/2021) FTT (failure to thrive) in adult (2/12/2021) Hypertension (2/16/2021) Severe protein-calorie malnutrition ([address] (2/19/2021) DISPOSITION: 
Discharge to home with hospice. 64 minutes were spent in discharge planning and coordination of care on the day of discharge for this patient. Randy Jaramillo NP [facility] Hematology & Oncology [address] [place] [address] Office : [phone] Fax : [phone] Attending Addendum: 
I have personally performed a face to face diagnostic evaluation on this patient. I have reviewed and agree with the care plan as documented by Randy Jaramillo N.P. My findings are as follows: She has metastatic adenocarcinoma, appears weak, heart rate regular without murmurs, abdomen is non-tender, bowel sounds are positive, we will discharge her home with hospice. Keegan Beth MD 
 
 
[facility] Hematology/Oncology [address] [place] [address] Office : [phone] Fax : [phone]

## 2021-02-19 NOTE — PROGRESS NOTES
END OF SHIFT NOTE: 
 
Intake/Output 02/19 0701 - 02/19 1900 In: 120 [P.O.:120] Out: 800 [Urine:800] Voiding: YES Catheter: NO 
Drain:   
Stool:  0 occurrences. Stool Assessment Stool Color: Lashay Zimmerman (02/18/21 2231) Stool Appearance: Loose; Watery (02/19/21 0737) Stool Amount: Small (02/18/21 2231) Stool Source/Status: Rectum (02/18/21 2231) Emesis:  0 occurrences. VITAL SIGNS Patient Vitals for the past 12 hrs: 
 Temp Pulse Resp BP SpO2  
02/19/21 1550 97.6 °F (36.4 °C) 76 18 (!) 166/90 97 % 02/19/21 1140 97.4 °F (36.3 °C) 71 18 (!) 165/90 100 % 02/19/21 0810     96 % 02/19/21 0729 98.5 °F (36.9 °C) 87 18 (!) 181/92 96 % Pain Assessment Pain 1 Pain Scale 1: Numeric (0 - 10) (02/19/21 1550) Pain Intensity 1: 0 (02/19/21 1550) Patient Stated Pain Goal: 0 (02/19/21 1550) Pain Reassessment 1: Yes (02/18/21 1932) Pain Onset 1: ongoing (02/17/21 0825) Pain Location 1: Back; Abdomen (02/17/21 0825) Pain Orientation 1: Mid (02/17/21 0825) Pain Description 1: Constant (02/17/21 0825) Pain Intervention(s) 1: Medication (see MAR) (02/17/21 0825) Ambulating [facility] with one assist 
 
Additional Information:   
Dr. Sloane King said will talk to  tomorrow concerning pts condition and to discuss plan. Shift report given to oncoming nurse Ruben Sousa RN at the bedside. Lala Shea

## 2021-02-19 NOTE — PROGRESS NOTES
Care Management Interventions PCP Verified by CM: Yes Mode of Transport at Discharge: Self Transition of Care Consult (CM Consult): Home Health, Discharge Planning [facility] - INPATIENT: Yes Physical Therapy Consult: Yes Current Support Network: Lives with Spouse Confirm Follow Up Transport: Family The Patient and/or Patient Representative was Provided with a Choice of Provider and Agrees with the Discharge Plan?: Yes Freedom of Choice List was Provided with Basic Dialogue that Supports the Patient's Individualized Plan of Care/Goals, Treatment Preferences and Shares the Quality Data Associated with the Providers?: Yes Discharge Location Discharge Placement: Home with home health Tony reviewed chart and talked with Onc RNP about pt's condition. Md has called pt's  to inform. Pt still quite pleasantly confused. RNP informed  worried about her confusion and caring for her. Tony called  to discuss home options. Sw left a [address] [facility] as well as 3 brochures for sitter agencies in a bag in pt's room for her . Tony talked with  about possible/planned discharge for pt to home tomorrow. Informed him about [facility] home health care Rn and PT. He was receptive. Robbin informed he has a nurse neighbor who he wants to talk with about who to hire for help. He says she knows their situation and they trust her opinion. RNP informed confusion could get a little better since she had IT chemo but likely will not resolve. Orders and ref placed for home health care. Sara Mckinney

## 2021-02-19 NOTE — PROGRESS NOTES
[facility] CARDIOLOGY PROGRESS NOTE 
      
 
2/19/2021 2:39 PM 
 
Admit Date: 2/12/2021 Subjective:  
 
No o/e; confused currently. Denies any pain. ROS: 
Cardiovascular:  As noted above Objective:  
  
Vitals:  
 02/19/21 0729 02/19/21 0810 02/19/21 1140 02/19/21 1243 BP: (!) 181/92  (!) 165/90 Pulse: 87  71 Resp: 18  18 Temp: 98.5 °F (36.9 °C)  97.4 °F (36.3 °C) SpO2: 96% 96% 100% Weight:      
Height:    4' 11\" (1.499 m) Physical Exam: 
General-No Acute Distress Neck- supple, no JVD 
CV- regular rate and rhythm no MRG Lung- clear bilaterally Abd- soft, nontender, nondistended Ext- no edema bilaterally. Skin- warm and dry Data Review:  
Recent Labs  
  02/19/21 
0331 02/18/21 
0507 * 136  
K 3.2* 3.1*  
MG 1.7* 1.6*  
BUN 10 9 CREA 0.44* 0.51* GLU 86 102* WBC 10.4 11.7* HGB 10.9* 10.9* HCT 32.5* 32.0*  
* 500* Assessment/Plan: Active Problems: 
  Pancreatic lesion (1/30/2021) -Work-up per hematology/oncology Generalized abdominal pain (1/31/2021) -Improved. Confusion (2/12/2021) 
-per primary Failure to thrive in adult (2/12/2021) FTT (failure to thrive) in adult (2/12/2021) Hypertension (2/16/2021) -Losartan increased to 100 mg daily. Also on propranolol. Added on amlodipine 5 mg daily and increase to 10mg. 
-Echo with preserved EF 
-replete stefanie Mary MD 
2/19/2021 2:39 PM

## 2021-02-19 NOTE — PROGRESS NOTES
Spoke with NP regarding pts high bp, Np stated cardiology may come see pt today, last bp 165/90, pt has been trending elevated bp.

## 2021-02-19 NOTE — PROGRESS NOTES
Pt is experiencing intermittent confusion,  came by, pt stated he was not her  but a cousin and that [place] the airport. Pts  was spoken to by [facility] Jean GARCIA to help him understand situation. Spoke with NP due to pts anxiety over man in her room ( that she did not recognize), Np ordered 1 mg ativan one time dose and prn ativan. Gave ativan 1 mg IV, pt is relaxed and resting in room. Will continue to monitor.

## 2021-02-19 NOTE — PROGRESS NOTES
[facility] Hematology & Oncology Inpatient Hematology / Oncology Progress Note Admission Date: 2021 10:08 PM 
Reason for Admission/Hospital Course: FTT (failure to thrive) in adult [R62.7] Abdominal pain [R10.9] Confusion [R41.0] 24 Hour Events:  
IT MTX  L spine +extensive mets T spine diffuse osseous mets C spine multiple osseous lesions NM bone scan pending ROS: 
Constitutional: negative for fever, chills, weakness, malaise, fatigue. CV:  negative for chest pain, palpitations, edema. Respiratory: negative for dyspnea, cough, wheezing. GI:  negative for nausea, diarrhea. + for abdominal pain and constipation 10 point review of systems is otherwise negative with the exception of the elements mentioned above in the HPI. Allergies Allergen Reactions  Sulfa (Sulfonamide Antibiotics) Anaphylaxis  Buspar [Buspirone] Vertigo  Pcn [Penicillins] Hives  Wellbutrin [Bupropion Hcl] Other (comments) Constipation OBJECTIVE: 
Patient Vitals for the past 8 hrs: 
 BP Temp Pulse Resp SpO2  
21 0810     96 % 21 0729 (!) 181/92 98.5 °F (36.9 °C) 87 18 96 % 21 0225 (!) 179/84 98.4 °F (36.9 °C) 73 18 95 % Temp (24hrs), Av.1 °F (36.7 °C), Min:97.5 °F (36.4 °C), Max:98.6 °F (37 °C) No intake/output data recorded. Physical Exam: 
Constitutional: Well developed, well nourished female in no acute distress, sitting comfortably in the hospital bed. HEENT: Normocephalic and atraumatic. Oropharynx is clear, mucous membranes are moist.  Pupils are equal, round, and reactive to light. Extraocular muscles are intact. Sclerae anicteric. Lymph node Deferred Skin Warm and dry. No bruising and no rash noted. No erythema. No pallor. Respiratory Lungs are clear to auscultation bilaterally without wheezes, rales or rhonchi, normal air exchange without accessory muscle use. CVS Normal rate, regular rhythm and normal S1 and S2. No murmurs, gallops, or rubs. Abdomen Soft, tender and nondistended, normoactive bowel sounds. No palpable mass. No hepatosplenomegaly. Neuro Pleasantly confused. Oriented to self. MSK Normal range of motion in general.  No edema and no tenderness. Psych Pleasantly confused Labs: 
   
Recent Labs  
  02/19/21 
0331 02/18/21 
0507 02/17/21 
4622 WBC 10.4 11.7* 10.4 RBC 4.19 4.15 4.10 HGB 10.9* 10.9* 10.7* HCT 32.5* 32.0* 31.7*  
MCV 77.6* 77.1* 77.3*  
MCH 26.0* 26.3 26.1 MCHC 33.5 34.1 33.8  
RDW 14.9* 14.9* 14.7* * 500* 455* GRANS 69 72 74 LYMPH 20 17 16 MONOS 10 10 9 EOS 0* 0* 0*  
BASOS 0 0 0 IG 1 1 1 DF AUTOMATED AUTOMATED AUTOMATED ANEU 7.2 8.4* 7.7 ABL 2.1 2.0 1.7 ABM 1.0 1.2 0.9 CITLALY 0.0 0.0 0.0 ABB 0.0 0.0 0.0 AIG 0.1 0.1 0.1 Recent Labs  
  02/19/21 
0331 02/18/21 
0507 02/17/21 
1003 * 136 135* K 3.2* 3.1* 3.1*  
 102 103 CO2 23 22 22 AGAP 11 12 10  
GLU 86 102* 95 BUN 10 9 9 CREA 0.44* 0.51* 0.50* GFRAA >60 >60 >60 GFRNA >60 >60 >60  
CA 9.2 9.1 9.0 * 289* 266* TP 6.4 6.2* 6.2* ALB 2.4* 2.4* 2.4*  
GLOB 4.0* 3.8* 3.8* AGRAT 0.6* 0.6* 0.6* MG 1.7* 1.6* 1.4* Imaging: 
 
Medications: 
Current Facility-Administered Medications Medication Dose Route Frequency  nystatin (MYCOSTATIN) 100,000 unit/mL oral suspension 500,000 Units  500,000 Units Oral QID  magnesium oxide (MAG-OX) tablet 400 mg  400 mg Oral TID  senna-docusate (PERICOLACE) 8.6-50 mg per tablet 2 Tab  2 Tab Oral BID  amLODIPine (NORVASC) tablet 5 mg  5 mg Oral DAILY  losartan (COZAAR) tablet 100 mg  100 mg Oral DAILY  naloxone (NARCAN) injection 0.4 mg  0.4 mg IntraVENous PRN  
 alum-mag hydroxide-simeth (MYLANTA) oral suspension 30 mL  30 mL Oral Q4H PRN  potassium chloride (K-DUR, KLOR-CON) SR tablet 20 mEq  20 mEq Oral BID  
  oxyCODONE IR (ROXICODONE) tablet 5 mg  5 mg Oral Q6H PRN  
 ondansetron (ZOFRAN) injection 8 mg  8 mg IntraVENous Q8H PRN  
 hydrALAZINE (APRESOLINE) 20 mg/mL injection 10 mg  10 mg IntraVENous Q6H PRN  
 famotidine (PEPCID) tablet 40 mg  40 mg Oral DAILY  FLUoxetine (PROzac) capsule 60 mg  60 mg Oral QHS  fluticasone propionate (FLONASE) 50 mcg/actuation nasal spray 2 Spray  2 Spray Both Nostrils DAILY  budesonide-formoteroL (SYMBICORT) 160-4.5 mcg/actuation HFA inhaler 2 Puff  2 Puff Inhalation BID RT  
 levothyroxine (SYNTHROID) tablet 88 mcg  88 mcg Oral ACB  pravastatin (PRAVACHOL) tablet 40 mg  40 mg Oral QHS  [Held by provider] traMADoL (ULTRAM) tablet 50 mg  50 mg Oral Q6H PRN  propranolol LA (INDERAL LA) capsule 120 mg  120 mg Oral DAILY  0.9% sodium chloride infusion  75 mL/hr IntraVENous CONTINUOUS  
 enoxaparin (LOVENOX) injection 40 mg  40 mg SubCUTAneous Q24H  
 
 
 
ASSESSMENT: 
 
Problem List  Date Reviewed: 2/12/2021 Codes Class Noted Hypertension ICD-10-CM: I10 
ICD-9-CM: 401.9  2/16/2021 Dehydration ICD-10-CM: E86.0 ICD-9-CM: 276.51  2/12/2021 Confusion ICD-10-CM: R41.0 ICD-9-CM: 298.9  2/12/2021 Failure to thrive in adult ICD-10-CM: R62.7 ICD-9-CM: 783.7  2/12/2021 Abdominal pain ICD-10-CM: R10.9 ICD-9-CM: 789.00  2/12/2021 FTT (failure to thrive) in adult ICD-10-CM: R62.7 ICD-9-CM: 783.7  2/12/2021 Generalized abdominal pain ICD-10-CM: R10.84 ICD-9-CM: 789.07  1/31/2021 Hepatic lesion ICD-10-CM: K76.9 ICD-9-CM: 573.8  1/30/2021 Leukocytosis ICD-10-CM: T90.946 ICD-9-CM: 288.60  1/30/2021 UTI (urinary tract infection) ICD-10-CM: N39.0 ICD-9-CM: 599.0  1/30/2021 Pancreatic lesion ICD-10-CM: K86.9 ICD-9-CM: 577.9  1/30/2021 Elevated LFTs ICD-10-CM: R79.89 ICD-9-CM: 790.6  1/30/2021 Lung nodule ICD-10-CM: R91.1 ICD-9-CM: 793.11  9/23/2020 Dyspepsia ICD-10-CM: R10.13 ICD-9-CM: 536.8  2/6/2019 Cervicalgia ICD-10-CM: M54.2 ICD-9-CM: 723.1  2/6/2019 Seborrheic keratoses, inflamed ICD-10-CM: L82.0 ICD-9-CM: 702.11  8/14/2018 History of cigarette smoking ICD-10-CM: Z87.891 ICD-9-CM: V15.82  7/10/2018 Gastroesophageal reflux disease without esophagitis ICD-10-CM: K21.9 ICD-9-CM: 530.81  5/17/2018 Palpitations ICD-10-CM: R00.2 ICD-9-CM: 785.1  7/25/2017 Microscopic hematuria ICD-10-CM: R31.29 ICD-9-CM: 599.72  6/27/2017 Memory loss ICD-10-CM: R41.3 ICD-9-CM: 780.93  10/5/2016 Breast mass in female ICD-10-CM: N63.0 ICD-9-CM: 611.72  10/27/2015 Fatigue due to depression ICD-10-CM: F32.9, R53.83 ICD-9-CM: 784, 780.79  10/23/2015 Acquired hypothyroidism ICD-10-CM: E03.9 ICD-9-CM: 244.9  10/23/2015 Essential hypertension ICD-10-CM: I10 
ICD-9-CM: 401.9  Unknown Depression, unipolar ([facility] [place] [address] ICD-10-CM: F33.9 ICD-9-CM: 296.20  Unknown Pure hypercholesterolemia ICD-10-CM: E78.00 ICD-9-CM: 272.0  Unknown PLAN: 
Pancreatic body and liver lesion/leptomenigial disease/bone lesions - path from biopsy on 2/2 pending 2/17 check breast tumor markers. patho sending out Caris, ER/MD/HER2. CSF + atypical cells-IR consulted for IT MTX. MRI spine ordered. Then will need NM bone survey. 2/18 BMbx moderately differentiating adenocarcinoma. IT MTX today. Breast tumor markers elevated. Cervical spine multiple osseous lesions suspicious for metastases. T and L spine pending. Bone scan pending. 2/19 L spine +extensive mets, T spine diffuse osseous mets. Liver biopsy +moderately differentiating adenocarcinoma-other studies pending PMH palpable lump right breast 3/2020 
-second look mammogram  demonstrated no definite focal abnormality, normal-appearing breast tissue in the right breast 6-7 o'clock positions. Abdominal pain - KUB negative, but did note? Foreign body in the left upper abdomen, but unsure if intraabdominal or exterior. Dr. Laisha Terrazas personally reviewed images. Reviewed palliative care recommendations for pain management. They stated could trial oxycodone 5 mg vs dilaudid 2 mg. Will try oxycodone 5 mg PRN. Will hold tramadol and Morphine in light of possible AMS. 2/14 some abdominal pain this AM, but she wishes to hold off on pain medication at this time. 2/16 pain resolved, ongoing discomfort Hallucinations/Confusion 
-Brain MRI pending 
-Hold Morphine and Tramadol. Try Oxycodone. 2/14 MRI of the brain with some pachymeningeal thickening and enhancement of the right convexity. Infection like meningitis is possible and intracranial hypotension is less likely. 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 Will order LP for tomorrow with cytology. Of note, patient notes that she has had a LP in the past with Dr. Oneyda Canseco, will see if we can obtain records. 2/15 IR consulted for LP. Check ammonia. Consult neuro. 2/16 Tele neuro consult this am-concerns for brain carcinoma. Recommend CT brain wo contrast 
 
Opioid induced constipation 
- Since KUB negative, Palliative care recommended lactulose TID x 3 doses and then Pericolace 2 tablets daily - BID. 2/14 on lactulose TID x 3 doses. Hypertension 2/16 cardiology consulted now on losartan, propanolol. Now also on amlodipine 5 mg daily. Tobacco abuse 2/17 smoker for 40+ years attempting cessation in November 2020 All questions answered to the best of our ability. 2/19/2021  I called and spoke with her  about his level of comfort with taking her home. His biggest concern is that she is very strong willed and he is worried that she will try to do too much. He is not opposed to rehab but would very much like to take her home. He is going to think things over and we will plan to revisit this tomorrow. PT recommends HH.   
 
 
  
Leobardo Horne NP 
 
 [facility] Hematology and Oncology/Radiation Oncology [id number] [facility] Office : [phone] Fax : [phone] Attending Addendum: 
I have personally performed a face to face diagnostic evaluation on this patient. I have reviewed and agree with the care plan as documented above by Sanaz Estes N.P.  My findings are as follows: Patient appears lethargic, heart rate regular without murmurs, abdomen is non-tender, bowel sounds are positive.  65 female now with hilar mass, pancreatic body, bones and liver lesions: Liver lesion biopsy results discussed with pathology, though not specific, results most consistent with breast primary.  Baseline confusion continues. S/p LP, CSF fluid w atypical cells. 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 Bone scan and spine MRI results noted. Breast TM high. S/p IT MTX. Also on Dex 4 mg tid. Guarded prognosis   
     
  
 
  
Jeff Dodd MD 
[facility] Hematology/Oncology [address] [address] Office : [phone] Fax : [phone]

## 2021-02-20 NOTE — PROGRESS NOTES
Will follow up with the patient on Monday. Recs per Dr. Inocente Ta as outlined. Please call as needed over the weekend. Lokesh Frederick

## 2021-02-20 NOTE — PROGRESS NOTES
[facility] Inpatient Hematology / Oncology Progress Note Admission Date: 2021 10:08 PM 
Reason for Admission/Hospital Course: FTT (failure to thrive) in adult [R62.7] Abdominal pain [R10.9] Confusion [R41.0] 24 Hour Events:  
IT MTX  Leptomeningeal disease Some confusion/agitation - worse yesterday, calm this morning Dispo planning ongoing ROS: 
Constitutional: negative for fever, chills, weakness, malaise, fatigue. CV:  negative for chest pain, palpitations, edema. Respiratory: negative for dyspnea, cough, wheezing. GI:  negative for nausea, diarrhea. + for abdominal pain and constipation 10 point review of systems is otherwise negative with the exception of the elements mentioned above in the HPI. Allergies Allergen Reactions  Sulfa (Sulfonamide Antibiotics) Anaphylaxis  Buspar [Buspirone] Vertigo  Pcn [Penicillins] Hives  Wellbutrin [Bupropion Hcl] Other (comments) Constipation OBJECTIVE: 
Patient Vitals for the past 8 hrs: 
 BP Temp Pulse Resp SpO2  
21 0838     94 % 21 0800 (!) 151/82 97.2 °F (36.2 °C) 87 18 96 % 21 0256 (!) 155/83 98.2 °F (36.8 °C) 87 18 96 % Temp (24hrs), Av.7 °F (36.5 °C), Min:97.2 °F (36.2 °C), Max:98.2 °F (36.8 °C) No intake/output data recorded. Physical Exam: 
Constitutional: Well developed, well nourished female in no acute distress, sitting comfortably in the hospital bed. HEENT: Normocephalic and atraumatic. Oropharynx is clear, mucous membranes are moist.  Pupils are equal, round, and reactive to light. Extraocular muscles are intact. Sclerae anicteric. Lymph node Deferred Skin Warm and dry. No bruising and no rash noted. No erythema. No pallor. Respiratory Lungs are clear to auscultation bilaterally without wheezes, rales or rhonchi, normal air exchange without accessory muscle use. CVS Normal rate, regular rhythm and normal S1 and S2.  No murmurs, gallops, or rubs.  
Abdomen Soft, tender and nondistended, normoactive bowel sounds.  No palpable mass.  No hepatosplenomegaly.  
Neuro Pleasantly confused. Oriented to self.   
MSK Normal range of motion in general.  No edema and no tenderness.  
Psych Pleasantly confused  
 
 
Labs: 
   
Recent Labs  
  02/20/21 
0519 02/19/21 
0331 02/18/21 
0507  
WBC 9.0 10.4 11.7*  
RBC 4.31 4.19 4.15  
HGB 11.1* 10.9* 10.9*  
HCT 33.1* 32.5* 32.0*  
MCV 76.8* 77.6* 77.1*  
MCH 25.8* 26.0* 26.3  
MCHC 33.5 33.5 34.1  
RDW 14.8* 14.9* 14.9*  
* 476* 500*  
GRANS 85* 69 72  
LYMPH 11* 20 17  
MONOS 3* 10 10  
EOS 0* 0* 0*  
BASOS 0 0 0  
IG 0 1 1  
DF AUTOMATED AUTOMATED AUTOMATED  
ANEU 7.7 7.2 8.4*  
ABL 1.0 2.1 2.0  
ABM 0.3 1.0 1.2  
CITLALY 0.0 0.0 0.0  
ABB 0.0 0.0 0.0  
AIG 0.0 0.1 0.1  
 
  
Recent Labs  
  02/20/21 
0519 02/19/21 
0331 02/18/21 
0507  
* 134* 136  
K 4.0 3.2* 3.1*  
 100 102  
CO2 22 23 22  
AGAP 10 11 12  
* 86 102*  
BUN 15 10 9  
CREA 0.42* 0.44* 0.51*  
GFRAA >60 >60 >60  
GFRNA >60 >60 >60  
CA 9.4 9.2 9.1  
* 342* 289*  
TP 6.4 6.4 6.2*  
ALB 2.4* 2.4* 2.4*  
GLOB 4.0* 4.0* 3.8*  
AGRAT 0.6* 0.6* 0.6*  
MG 2.0 1.7* 1.6*  
 
 
 
Imaging: 
 
Medications: 
Current Facility-Administered Medications  
Medication Dose Route Frequency  
• dexAMETHasone (DECADRON) tablet 4 mg  4 mg Oral TID  
• LORazepam (ATIVAN) tablet 1 mg  1 mg Oral Q6H PRN  
• amLODIPine (NORVASC) tablet 10 mg  10 mg Oral DAILY  
• nystatin (MYCOSTATIN) 100,000 unit/mL oral suspension 500,000 Units  500,000 Units Oral QID  
• magnesium oxide (MAG-OX) tablet 400 mg  400 mg Oral TID  
• senna-docusate (PERICOLACE) 8.6-50 mg per tablet 2 Tab  2 Tab Oral BID  
• losartan (COZAAR) tablet 100 mg  100 mg Oral DAILY  
• naloxone (NARCAN) injection 0.4 mg  0.4 mg IntraVENous PRN  
  alum-mag hydroxide-simeth (MYLANTA) oral suspension 30 mL  30 mL Oral Q4H PRN  potassium chloride (K-DUR, KLOR-CON) SR tablet 20 mEq  20 mEq Oral BID  
 oxyCODONE IR (ROXICODONE) tablet 5 mg  5 mg Oral Q6H PRN  
 ondansetron (ZOFRAN) injection 8 mg  8 mg IntraVENous Q8H PRN  
 hydrALAZINE (APRESOLINE) 20 mg/mL injection 10 mg  10 mg IntraVENous Q6H PRN  
 famotidine (PEPCID) tablet 40 mg  40 mg Oral DAILY  FLUoxetine (PROzac) capsule 60 mg  60 mg Oral QHS  fluticasone propionate (FLONASE) 50 mcg/actuation nasal spray 2 Spray  2 Spray Both Nostrils DAILY  budesonide-formoteroL (SYMBICORT) 160-4.5 mcg/actuation HFA inhaler 2 Puff  2 Puff Inhalation BID RT  
 levothyroxine (SYNTHROID) tablet 88 mcg  88 mcg Oral ACB  pravastatin (PRAVACHOL) tablet 40 mg  40 mg Oral QHS  [Held by provider] traMADoL (ULTRAM) tablet 50 mg  50 mg Oral Q6H PRN  propranolol LA (INDERAL LA) capsule 120 mg  120 mg Oral DAILY  0.9% sodium chloride infusion  75 mL/hr IntraVENous CONTINUOUS  
 enoxaparin (LOVENOX) injection 40 mg  40 mg SubCUTAneous Q24H  
 
 
 
ASSESSMENT: 
 
Problem List  Date Reviewed: 2/12/2021 Codes Class Noted Severe protein-calorie malnutrition ([facility] [address] ICD-10-CM: O59 ICD-9-CM: 050  2/19/2021 Hypertension ICD-10-CM: I10 
ICD-9-CM: 401.9  2/16/2021 Dehydration ICD-10-CM: E86.0 ICD-9-CM: 276.51  2/12/2021 Confusion ICD-10-CM: R41.0 ICD-9-CM: 298.9  2/12/2021 Failure to thrive in adult ICD-10-CM: R62.7 ICD-9-CM: 783.7  2/12/2021 Abdominal pain ICD-10-CM: R10.9 ICD-9-CM: 789.00  2/12/2021 FTT (failure to thrive) in adult ICD-10-CM: R62.7 ICD-9-CM: 783.7  2/12/2021 Generalized abdominal pain ICD-10-CM: R10.84 ICD-9-CM: 789.07  1/31/2021 Hepatic lesion ICD-10-CM: K76.9 ICD-9-CM: 573.8  1/30/2021 Leukocytosis ICD-10-CM: S76.620 ICD-9-CM: 288.60  1/30/2021 UTI (urinary tract infection) ICD-10-CM: N39.0 ICD-9-CM: 599.0  1/30/2021 Pancreatic lesion ICD-10-CM: K86.9 ICD-9-CM: 577.9  1/30/2021 Elevated LFTs ICD-10-CM: R79.89 ICD-9-CM: 790.6  1/30/2021 Lung nodule ICD-10-CM: R91.1 ICD-9-CM: 793.11  9/23/2020 Dyspepsia ICD-10-CM: R10.13 ICD-9-CM: 536.8  2/6/2019 Cervicalgia ICD-10-CM: M54.2 ICD-9-CM: 723.1  2/6/2019 Seborrheic keratoses, inflamed ICD-10-CM: L82.0 ICD-9-CM: 702.11  8/14/2018 History of cigarette smoking ICD-10-CM: Z87.891 ICD-9-CM: V15.82  7/10/2018 Gastroesophageal reflux disease without esophagitis ICD-10-CM: K21.9 ICD-9-CM: 530.81  5/17/2018 Palpitations ICD-10-CM: R00.2 ICD-9-CM: 785.1  7/25/2017 Microscopic hematuria ICD-10-CM: R31.29 ICD-9-CM: 599.72  6/27/2017 Memory loss ICD-10-CM: R41.3 ICD-9-CM: 780.93  10/5/2016 Breast mass in female ICD-10-CM: N63.0 ICD-9-CM: 611.72  10/27/2015 Fatigue due to depression ICD-10-CM: F32.9, R53.83 ICD-9-CM: 640, 780.79  10/23/2015 Acquired hypothyroidism ICD-10-CM: E03.9 ICD-9-CM: 244.9  10/23/2015 Essential hypertension ICD-10-CM: I10 
ICD-9-CM: 401.9  Unknown Depression, unipolar ([address] ICD-10-CM: F33.9 ICD-9-CM: 296.20  Unknown Pure hypercholesterolemia ICD-10-CM: E78.00 ICD-9-CM: 272.0  Unknown PLAN: 
Pancreatic body and liver lesion/leptomenigial disease/bone lesions - path from biopsy on 2/2 pending 2/17 check breast tumor markers. patho sending out Caris, ER/[place]/HER2. CSF + atypical cells-IR consulted for IT MTX. MRI spine ordered. Then will need NM bone survey. 2/18 BMbx moderately differentiating adenocarcinoma. IT MTX today. Breast tumor markers elevated. Cervical spine multiple osseous lesions suspicious for metastases. T and L spine pending. Bone scan pending. 2/19 L spine +extensive mets, T spine diffuse osseous mets. Liver biopsy +moderately differentiating adenocarcinoma-other studies pending 2/20 Caris sent per navigator. Bone scan shows bilateral femora and right calvarium and questionable right ischial tuberosity metastatic disease. PMH palpable lump right breast 3/2020 
-second look mammogram  demonstrated no definite focal abnormality, normal-appearing breast tissue in the right breast 6-7 o'clock positions. Abdominal pain  
- KUB negative, but did note? Foreign body in the left upper abdomen, but unsure if intraabdominal or exterior. Dr. Herndon Crew personally reviewed images. Reviewed palliative care recommendations for pain management. They stated could trial oxycodone 5 mg vs dilaudid 2 mg. Will try oxycodone 5 mg PRN. Will hold tramadol and Morphine in light of possible AMS. 2/14 some abdominal pain this AM, but she wishes to hold off on pain medication at this time. 2/16 pain resolved, ongoing discomfort Hallucinations/Confusion 
-Brain MRI pending 
-Hold Morphine and Tramadol. Try Oxycodone. 2/14 MRI of the brain with some pachymeningeal thickening and enhancement of the right convexity. Infection like meningitis is possible and intracranial hypotension is less likely. 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 Will order LP for tomorrow with cytology. Of note, patient notes that she has had a LP in the past with Dr. Viji Florian, will see if we can obtain records. 2/15 IR consulted for LP. Check ammonia. Consult neuro. 2/16 Tele neuro consult this am-concerns for brain carcinoma. Recommend CT brain wo contrast 
2/20 Ongoing confusion/altered mental status that may make caring for patient at home challenging. Opioid induced constipation 
- Since KUB negative, Palliative care recommended lactulose TID x 3 doses and then Pericolace 2 tablets daily - BID. 2/14 on lactulose TID x 3 doses. Hypertension 2/16 cardiology consulted now on losartan, propanolol. Now also on amlodipine 5 mg daily. 2/20 Amlodipine increased to 10mg daily this AM.  
 
Tobacco abuse 2/17 smoker for 40+ years attempting cessation in November 2020 All questions answered to the best of our ability. 2/19/2021  I called and spoke with her  about his level of comfort with taking her home. His biggest concern is that she is very strong willed and he is worried that she will try to do too much. He is not opposed to rehab but would very much like to take her home. He is going to think things over and we will plan to revisit this tomorrow. PT recommends [place].  
 
2/20/2021  updated at bedside after rounds. Expresses concern about caring for Ms Steffi Sandhoff at home. Updated RN to allow  to visit tomorrow to be present for rounds. COREY Erickson [facility] H[facility] On[facility] [address] [place] [address] Office : [phone] Fax : [phone] Attending Addendum: 
I have personally performed a face to face diagnostic evaluation on this patient. I have reviewed and agree with the care plan as documented above by Judy Mackenzie N.P.  My findings are as follows: Patient appears lethargic, heart rate regular without murmurs, abdomen is non-tender, bowel sounds are positive.  71 female now with hilar mass, pancreatic body, bones and liver lesions: Liver lesion biopsy results discussed with pathology, though not specific, results most consistent with breast primary.  Receptor status pending. Baseline confusion continues. S/p LP, CSF fluid w atypical cells. 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 Bone scan and spine MRI results noted. Breast TM high. S/p IT MTX. Also on Dex 6 mg tid. Guarded prognosis, goals of care will need to be addressed, will d/w family ().   
     
  
 
  
Michel Roberson MD 
[facility] Hematology/Oncology [address] [place] [address] Office : [phone] Fax : [phone]

## 2021-02-20 NOTE — PROGRESS NOTES
PT note:  Patient declined physical therapy this morning. Patient reports she just got back in her bed. Will attempt to see patient tomorrow.

## 2021-02-21 NOTE — PROGRESS NOTES
Problem: Falls - Risk of 
Goal: *Absence of Falls Description: Document Chevy Mccarthy Fall Risk and appropriate interventions in the flowsheet. Outcome: Progressing Towards Goal 
Note: Fall Risk Interventions: 
Mobility Interventions: Bed/chair exit alarm, Communicate number of staff needed for ambulation/transfer, Patient to call before getting OOB Mentation Interventions: Adequate sleep, hydration, pain control, Bed/chair exit alarm, Door open when patient unattended, More frequent rounding, Reorient patient Medication Interventions: Bed/chair exit alarm, Evaluate medications/consider consulting pharmacy, Patient to call before getting OOB, Teach patient to arise slowly Elimination Interventions: Bed/chair exit alarm, Call light in reach, Patient to call for help with toileting needs History of Falls Interventions: Bed/chair exit alarm, Consult care management for discharge planning, Door open when patient unattended, Evaluate medications/consider consulting pharmacy, Investigate reason for fall, Room close to nurse's station

## 2021-02-21 NOTE — PROGRESS NOTES
Problem: Mobility Impaired (Adult and Pediatric) Goal: *Acute Goals and Plan of Care (Insert Text) Description: STG: 
(1.)Ms. Kaden Berrios will move from supine to sit and sit to supine  with STAND BY ASSIST within 4-7 treatment day(s). (2.)Ms. Kaden Berrios will transfer from bed to chair and chair to bed with CONTACT GUARD ASSIST using the least restrictive device within 4-7 treatment day(s). (3.)Ms. Kaden Berrios will ambulate with CONTACT GUARD ASSIST for 100 feet with the least restrictive device within 4-7 treatment day(s). - progressing 2/21 
 
________________________________________________________________________________________________ Outcome: Progressing Towards Goal 
  
PHYSICAL THERAPY: Daily Note and PM 2/21/2021 INPATIENT: PT Visit Days : 3 Payor: Chana Samuel / Plan: [address] [place] [address] HMO / Product Type: Managed Care Medicare /   
  
NAME/AGE/GENDER: Sandor Gentile is a 76 y.o. female PRIMARY DIAGNOSIS: FTT (failure to thrive) in adult [R62.7] Abdominal pain [R10.9] Confusion [R41.0] <principal problem not specified> <principal problem not specified> 
 
  
ICD-10: Treatment Diagnosis:  
 · Generalized Muscle Weakness (M62.81) · Difficulty in walking, Not elsewhere classified (R26.2) Precaution/Allergies: 
Sulfa (sulfonamide antibiotics), Buspar [buspirone], Pcn [penicillins], and Wellbutrin [bupropion hcl] ASSESSMENT:  
 
 Ms. Ginger Sampson presents supine on contact, she was admitted with above diagnosis. RN and SW stated that in addition to her bone lesions she also has brain mets. She does present with confusion as well but answers questions and follows commands. She would ask a lot of questions as well like what do ou want me to. She lives at home with her spouse and used a RW. She presents with generalized weakness and decreased independence with functional mobility. She will benefit from skilled PT interventions to maximize independence with functional mobility and strengthening. Worked on bed mobility and sit to stand. She appears to be uncomfortable in her back with mobility. Took some side steps at head of bed and returned to sitting. Sit to stand and again and worked on some steps with RW. Pt having some difficulty navigating walker and ended up helping her with hand held assist over to a wheelchair. Transport took pt to CT. Hope to progress at next therapy session. 2/17 AM; She is still a bit confused. When using the RW,she requires tactile cueing to stay inside RW during turns and backing up. Assisted to restroom. Ambulates around room, then stays up in recliner on alarm pad and completes exs. Recommend supervision at CO to insure safe adjustment to home. The walker that is in her room is too tall for her. Could benefit from [facility]. Will speak to SW 
2/19 AM: in bed. Willing to participate. She performs LE exs, then ambulates in room. Began to fell dizzy, se we turned around and went to recliner. She did appear to have some difficulty placing R hand on walker handle. Don't know if it perceptual issues. Stayed up in chair. CNA in room 2/21: Pt supine in bed. Very fatigued but agreeable to work with PT. Emili supine to sit and CGA with RW for gait. Slow, shuffled ambulation. Pt performed toileting tasks then ambulated back to bed with reports of fatigue and wanting to lie down again. Able to follow cues and direction better than previous sessions. This section established at most recent assessment PROBLEM LIST (Impairments causing functional limitations): 1. Decreased Strength 2. Decreased ADL/Functional Activities 3. Decreased Transfer Abilities 4. Decreased Ambulation Ability/Technique 5. Decreased Balance INTERVENTIONS PLANNED: (Benefits and precautions of physical therapy have been discussed with the patient.) 1. Balance Exercise 2. Bed Mobility 3. Gait Training 4. Therapeutic Activites 5. Therapeutic Exercise/Strengthening 6. Transfer Training TREATMENT PLAN: Frequency/Duration: daily for duration of hospital stay Rehabilitation Potential For Stated Goals: Good REHAB RECOMMENDATIONS (at time of discharge pending progress):   
Placement: It is my opinion, based on this patient's performance to date, that Ms. Keren Luis may benefit from [address] after discharge due to the functional deficits listed above that are likely to improve with skilled rehabilitation because he/she has multiple medical issues that affect his/her functional mobility in the community. Equipment: ?  To be determined Could benefit from [facility]  
    
 
 
 
HISTORY:  
History of Present Injury/Illness (Reason for Referral): 
 PER MD NOTE: Ms Tori Sanders is a 70yo woman with PMHx of HLD, HTN, vertigo, anxiety and depression with recent hx of abdominal pain/N/V. Her s/s started around Thanksgiving. She notes weight loss of ~20ls since then. She was recently seen at [facility]. US showed 1.8cm lesion in the pancreatic body and liver lesion in the right lobe. She was recommended to undergo abd MRI. She was recently admitted at [facility] and MRI showed multiple liver lesions concerning for metastatic disease. She underwent bx on 2/2 and path is pending. Reviewed with path today await IHCs. Her imaging also showed bone lesions in spine and pelvis. Subsequent CT chest from 2/9 showed lung lesions. She was evaluated by Dr Jossy Richards and plan was to p/w EBUS if liver bx confirms pancreatic source to eval for second primary. Today, she was seen at [facility] for [place]. Unfortunately, she has gotten progressively worse since d/c. Her  is here with her and reported continued decline. She has hallucinated at home. She is on pain medications. This is somewhat improved since tapering morphine. She has significant abd pain and no BM for 7 days. + flatus per . No vomiting. She is not eating much. She sleeps most of the day. Admitting for symptom management/pain, r/o brain mets and worsening abd pain (which could be from her disease alone). PC saw pt at [facility]. Sending to [facility] infusion for IVF/labs first as she is awaiting a bed. Past Medical History/Comorbidities: Ms. Iliana Sigala  has a past medical history of Anxiety state, unspecified, Dyslipidemia, HTN (hypertension), Major depressive disorder, recurrent episode, in partial or unspecified remission, Tobacco abuse, and Vertigo. Ms. Iliana Sigala  has a past surgical history that includes hx tonsillectomy (1995); hx total abdominal hysterectomy (2000); hx bladder suspension (2000); hx tubal ligation (1992); hx dilation and curettage (1999); hx colonoscopy (2006, 2011); and ir spinal puncture csf treat / drain (2/18/2021). Social History/Living Environment:  
Home Environment: Private residence # Steps to Enter: 0 One/Two Story Residence: One story Living Alone: No 
Support Systems: Family member(s), Spouse/Significant Other/Partner Patient Expects to be Discharged to[de-identified] Private residence Current DME Used/Available at Home: Walker, rolling Prior Level of Function/Work/Activity: 
Pt living at home with her spouse, used a walker for mobility Number of Personal Factors/Comorbidities that affect the Plan of Care: 1-2: MODERATE COMPLEXITY EXAMINATION:  
Most Recent Physical Functioning:  
Gross Assessment: 
AROM: Generally decreased, functional 
Strength: Generally decreased, functionalgrossly 3+ B LEs Posture: 
  
Balance: 
Sitting: Intact Sitting - Static: Good (unsupported) Sitting - Dynamic: Fair (occasional) Standing: Pull to stand; With support; Impaired Standing - Static: Good Standing - Dynamic : Fair Bed Mobility: 
Rolling: Supervision Supine to Sit: Minimum assistance Sit to Supine: Contact guard assistance Scooting: Contact guard assistance Wheelchair Mobility: 
  
Transfers: 
Sit to Stand: Minimum assistance Stand to Sit: Stand-by assistance Duration: 18 Minutes Gait: 
  
Base of Support: Narrowed Speed/Keiko: Pace decreased (<100 feet/min) Step Length: Right shortened;Left shortened Gait Abnormalities: Antalgic;Decreased step clearance Distance (ft): 12 Feet (ft) Assistive Device: Walker, rolling Ambulation - Level of Assistance: Contact guard assistance Interventions: Safety awareness training; Tactile cues; Verbal cues Body Structures Involved: 1. Muscles Body Functions Affected: 1. Movement Related Activities and Participation Affected: 1. General Tasks and Demands 2. Mobility 3. Self Care Number of elements that affect the Plan of Care: 4+: HIGH COMPLEXITY CLINICAL PRESENTATION:  
Presentation: Stable and uncomplicated: LOW COMPLEXITY CLINICAL DECISION MAKIN[address] [place] [address] AM-PAC 6 Clicks Basic Mobility Inpatient Short Form How much difficulty does the patient currently have. .. Unable A Lot A Little None 1. Turning over in bed (including adjusting bedclothes, sheets and blankets)? [] 1   [] 2   [x] 3   [] 4  
2. Sitting down on and standing up from a chair with arms ( e.g., wheelchair, bedside commode, etc.)   [] 1   [] 2   [x] 3   [] 4  
3. Moving from lying on back to sitting on the side of the bed? [] 1   [] 2   [x] 3   [] 4 How much help from another person does the patient currently need. .. Total A Lot A Little None 4. Moving to and from a bed to a chair (including a wheelchair)? [] 1   [] 2   [x] 3   [] 4  
5. Need to walk in hospital room? [] 1   [] 2   [x] 3   [] 4  
6. Climbing 3-5 steps with a railing? [] 1   [] 2   [x] 3   [] 4  
© , Trustees of [address] [place] [address], under license to [employer]. All rights reserved Score:  Initial: 18 Most Recent: X (Date: -- ) Interpretation of Tool:  Represents activities that are increasingly more difficult (i.e. Bed mobility, Transfers, Gait). Medical Necessity:    
· Patient is expected to demonstrate progress in  
· strength and functional technique ·  to  
· decrease assistance required with functional mobility and strengthening · . Reason for Services/Other Comments: 
· Patient continues to require skilled intervention due to  
 · Inability to complete functional mobility independently · . Use of outcome tool(s) and clinical judgement create a POC that gives a: Clear prediction of patient's progress: LOW COMPLEXITY  
  
 
 
 
TREATMENT:  
(In addition to Assessment/Re-Assessment sessions the following treatments were rendered) Pre-treatment Symptoms/Complaints: Very tired Pain: Initial:  
Pain Intensity 1: 4 Pain Location 1: Leg 
Pain Orientation 1: Right  Post Session: 2 Therapeutic Exercise: ( ):  Exercises per grid below to improve mobility, strength, coordination and endurance. Required minimal visual, verbal and tactile cues to promote proper body alignment and promote proper body breathing techniques. Progressed resistance, range, repetitions and complexity of movement as indicated. Date: 
2/17/21 B Es Date: 
2/19/21 B LEs Date: Activity/Exercise Parameters Parameters Parameters RECLINED: ankle pumps 10 15 Hip AB/AD 10 15 Heel slides 10 15 SLR 10 15 SHldr flex 10 15 Elbow flex 10 15 Therapeutic Activity: (  18 Minutes ):  Therapeutic activities including bed mobility and bed transfers, toilet transfers, and Ambulation on level ground to improve mobility, strength, balance, and coordination. Required minimal verbal cues to stay on task and for safetySafety awareness training; Tactile cues; Verbal cues to promote static and dynamic balance in standing. Braces/Orthotics/Lines/Etc:  
· O2 Device: Room air Treatment/Session Assessment:   
· Response to Treatment: fatigued throughout treatment · Interdisciplinary Collaboration:  
o Physical Therapist 
o Registered Nurse · After treatment position/precautions:  
o Supine in bed 
o Bed/Chair-wheels locked 
o Bed in low position 
o Call light within reach · Compliance with Program/Exercises: Will assess as treatment progresses · Recommendations/Intent for next treatment session: \"Next visit will focus on advancements to more challenging activities and reduction in assistance provided\". Total Treatment Duration: PT Patient Time In/Time Out Time In: 1350 Time Out: [address] [place] PT

## 2021-02-21 NOTE — PROGRESS NOTES
[facility] Inpatient Hematology / Oncology Progress Note Admission Date: 2021 10:08 PM 
Reason for Admission/Hospital Course: FTT (failure to thrive) in adult [R62.7] Abdominal pain [R10.9] Confusion [R41.0] 24 Hour Events:  
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 
Constitutional: negative for fever, chills, weakness, malaise, fatigue. CV:  negative for chest pain, palpitations, edema. Respiratory: negative for dyspnea, cough, wheezing. GI:  negative for nausea, diarrhea. + for abdominal pain and constipation (resolved) 10 point review of systems is otherwise negative with the exception of the elements mentioned above in the HPI. Allergies Allergen Reactions  Sulfa (Sulfonamide Antibiotics) Anaphylaxis  Buspar [Buspirone] Vertigo  Pcn [Penicillins] Hives  Wellbutrin [Bupropion Hcl] Other (comments) Constipation OBJECTIVE: 
Patient Vitals for the past 8 hrs: 
 BP Temp Pulse Resp SpO2  
21 1200 (!) 174/85 98.1 °F (36.7 °C) 78 18 98 % 21 0941     99 % 21 0810 (!) 180/90 97.5 °F (36.4 °C) 83 18 97 % Temp (24hrs), Av.9 °F (36.6 °C), Min:97.5 °F (36.4 °C), Max:98.1 °F (36.7 °C) No intake/output data recorded. Physical Exam: 
Constitutional: Well developed, well nourished female in no acute distress, sitting comfortably in the hospital bed. HEENT: Normocephalic and atraumatic. Oropharynx is clear, mucous membranes are moist.  Pupils are equal, round, and reactive to light. Extraocular muscles are intact. Sclerae anicteric. Lymph node Deferred Skin Warm and dry. No bruising and no rash noted. No erythema. No pallor. Respiratory Lungs are clear to auscultation bilaterally without wheezes, rales or rhonchi, normal air exchange without accessory muscle use. CVS Normal rate, regular rhythm and normal S1 and S2. No murmurs, gallops, or rubs. Abdomen Soft, tender and nondistended, normoactive bowel sounds. No palpable mass. No hepatosplenomegaly. Neuro Pleasantly confused. Oriented to self. MSK Normal range of motion in general.  No edema and no tenderness. Psych Pleasantly confused Labs: 
   
Recent Labs  
  02/21/21 0459 02/20/21 
0519 02/19/21 
0331 WBC 9.3 9.0 10.4 RBC 4.12 4.31 4.19 HGB 10.8* 11.1* 10.9* HCT 31.9* 33.1* 32.5* MCV 77.4* 76.8* 77.6*  
MCH 26.2 25.8* 26.0*  
MCHC 33.9 33.5 33.5  
RDW 14.8* 14.8* 14.9*  
* 474* 476* GRANS 85* 85* 69  
LYMPH 13 11* 20  
MONOS 1* 3* 10  
EOS 0* 0* 0*  
BASOS 0 0 0 IG 0 0 1 DF AUTOMATED AUTOMATED AUTOMATED ANEU 7.9 7.7 7.2 ABL 1.3 1.0 2.1 ABM 0.1 0.3 1.0 CITLALY 0.0 0.0 0.0 ABB 0.0 0.0 0.0 AIG 0.0 0.0 0.1 Recent Labs  
  02/21/21 0459 02/20/21 
0519 02/19/21 
0331 * 134* 134* K 4.0 4.0 3.2*  
 102 100 CO2 22 22 23 AGAP 9 10 11 * 119* 86 BUN 18 15 10 CREA 0.49* 0.42* 0.44* GFRAA >60 >60 >60 GFRNA >60 >60 >60  
CA 9.2 9.4 9.2 * 354* 342* TP 6.4 6.4 6.4 ALB 2.4* 2.4* 2.4*  
GLOB 4.0* 4.0* 4.0* AGRAT 0.6* 0.6* 0.6* MG 2.0 2.0 1.7* Imaging: 
 
Medications: 
Current Facility-Administered Medications Medication Dose Route Frequency  dexAMETHasone (DECADRON) tablet 6 mg  6 mg Oral TID  LORazepam (ATIVAN) tablet 1 mg  1 mg Oral Q6H PRN  
 amLODIPine (NORVASC) tablet 10 mg  10 mg Oral DAILY  nystatin (MYCOSTATIN) 100,000 unit/mL oral suspension 500,000 Units  500,000 Units Oral QID  magnesium oxide (MAG-OX) tablet 400 mg  400 mg Oral TID  senna-docusate (PERICOLACE) 8.6-50 mg per tablet 2 Tab  2 Tab Oral BID  losartan (COZAAR) tablet 100 mg  100 mg Oral DAILY  naloxone (NARCAN) injection 0.4 mg  0.4 mg IntraVENous PRN  
  alum-mag hydroxide-simeth (MYLANTA) oral suspension 30 mL  30 mL Oral Q4H PRN  potassium chloride (K-DUR, KLOR-CON) SR tablet 20 mEq  20 mEq Oral BID  
 oxyCODONE IR (ROXICODONE) tablet 5 mg  5 mg Oral Q6H PRN  
 ondansetron (ZOFRAN) injection 8 mg  8 mg IntraVENous Q8H PRN  
 hydrALAZINE (APRESOLINE) 20 mg/mL injection 10 mg  10 mg IntraVENous Q6H PRN  
 famotidine (PEPCID) tablet 40 mg  40 mg Oral DAILY  FLUoxetine (PROzac) capsule 60 mg  60 mg Oral QHS  fluticasone propionate (FLONASE) 50 mcg/actuation nasal spray 2 Spray  2 Spray Both Nostrils DAILY  budesonide-formoteroL (SYMBICORT) 160-4.5 mcg/actuation HFA inhaler 2 Puff  2 Puff Inhalation BID RT  
 levothyroxine (SYNTHROID) tablet 88 mcg  88 mcg Oral ACB  pravastatin (PRAVACHOL) tablet 40 mg  40 mg Oral QHS  [Held by provider] traMADoL (ULTRAM) tablet 50 mg  50 mg Oral Q6H PRN  propranolol LA (INDERAL LA) capsule 120 mg  120 mg Oral DAILY  0.9% sodium chloride infusion  75 mL/hr IntraVENous CONTINUOUS  
 enoxaparin (LOVENOX) injection 40 mg  40 mg SubCUTAneous Q24H  
 
 
 
ASSESSMENT: 
 
Problem List  Date Reviewed: 2/12/2021 Codes Class Noted Severe protein-calorie malnutrition ([facility] [address] ICD-10-CM: N10 ICD-9-CM: 412  2/19/2021 Hypertension ICD-10-CM: I10 
ICD-9-CM: 401.9  2/16/2021 Dehydration ICD-10-CM: E86.0 ICD-9-CM: 276.51  2/12/2021 Confusion ICD-10-CM: R41.0 ICD-9-CM: 298.9  2/12/2021 Failure to thrive in adult ICD-10-CM: R62.7 ICD-9-CM: 783.7  2/12/2021 Abdominal pain ICD-10-CM: R10.9 ICD-9-CM: 789.00  2/12/2021 FTT (failure to thrive) in adult ICD-10-CM: R62.7 ICD-9-CM: 783.7  2/12/2021 Generalized abdominal pain ICD-10-CM: R10.84 ICD-9-CM: 789.07  1/31/2021 Hepatic lesion ICD-10-CM: K76.9 ICD-9-CM: 573.8  1/30/2021 Leukocytosis ICD-10-CM: C45.902 ICD-9-CM: 288.60  1/30/2021 UTI (urinary tract infection) ICD-10-CM: N39.0 ICD-9-CM: 599.0  1/30/2021 Pancreatic lesion ICD-10-CM: K86.9 ICD-9-CM: 577.9  1/30/2021 Elevated LFTs ICD-10-CM: R79.89 ICD-9-CM: 790.6  1/30/2021 Lung nodule ICD-10-CM: R91.1 ICD-9-CM: 793.11  9/23/2020 Dyspepsia ICD-10-CM: R10.13 ICD-9-CM: 536.8  2/6/2019 Cervicalgia ICD-10-CM: M54.2 ICD-9-CM: 723.1  2/6/2019 Seborrheic keratoses, inflamed ICD-10-CM: L82.0 ICD-9-CM: 702.11  8/14/2018 History of cigarette smoking ICD-10-CM: Z87.891 ICD-9-CM: V15.82  7/10/2018 Gastroesophageal reflux disease without esophagitis ICD-10-CM: K21.9 ICD-9-CM: 530.81  5/17/2018 Palpitations ICD-10-CM: R00.2 ICD-9-CM: 785.1  7/25/2017 Microscopic hematuria ICD-10-CM: R31.29 ICD-9-CM: 599.72  6/27/2017 Memory loss ICD-10-CM: R41.3 ICD-9-CM: 780.93  10/5/2016 Breast mass in female ICD-10-CM: N63.0 ICD-9-CM: 611.72  10/27/2015 Fatigue due to depression ICD-10-CM: F32.9, R53.83 ICD-9-CM: 052, 780.79  10/23/2015 Acquired hypothyroidism ICD-10-CM: E03.9 ICD-9-CM: 244.9  10/23/2015 Essential hypertension ICD-10-CM: I10 
ICD-9-CM: 401.9  Unknown Depression, unipolar ([address] ICD-10-CM: F33.9 ICD-9-CM: 296.20  Unknown Pure hypercholesterolemia ICD-10-CM: E78.00 ICD-9-CM: 272.0  Unknown PLAN: 
Pancreatic body and liver lesion/leptomenigial disease/bone lesions - path from biopsy on 2/2 pending 2/17 check breast tumor markers. patho sending out Caris, ER/IN/HER2. CSF + atypical cells-IR consulted for IT MTX. MRI spine ordered. Then will need NM bone survey. 2/18 BMbx moderately differentiating adenocarcinoma. IT MTX today. Breast tumor markers elevated. Cervical spine multiple osseous lesions suspicious for metastases. T and L spine pending. Bone scan pending. 2/19 L spine +extensive mets, T spine diffuse osseous mets. Liver biopsy +moderately differentiating adenocarcinoma-other studies pending 2/20 Caris sent per navigator. Bone scan shows bilateral femora and right calvarium and questionable right ischial tuberosity metastatic disease. 2/21 Started on 4mg Dex BID 2/19 and increased to 6mg Dex BID yesterday. PMH palpable lump right breast 3/2020 
-second look mammogram  demonstrated no definite focal abnormality, normal-appearing breast tissue in the right breast 6-7 o'clock positions. Abdominal pain  
- KUB negative, but did note? Foreign body in the left upper abdomen, but unsure if intraabdominal or exterior. Dr. Westley Miles personally reviewed images. Reviewed palliative care recommendations for pain management. They stated could trial oxycodone 5 mg vs dilaudid 2 mg. Will try oxycodone 5 mg PRN. Will hold tramadol and Morphine in light of possible AMS. 2/14 some abdominal pain this AM, but she wishes to hold off on pain medication at this time. 2/16 pain resolved, ongoing discomfort 2/21 No complaints, not having to utilize pain meds. RESOLVED Hallucinations/Confusion 
-Brain MRI pending 
-Hold Morphine and Tramadol. Try Oxycodone. 2/14 MRI of the brain with some pachymeningeal thickening and enhancement of the right convexity. Infection like meningitis is possible and intracranial hypotension is less likely. 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 Will order LP for tomorrow with cytology. Of note, patient notes that she has had a LP in the past with Dr. Marlene Russell, will see if we can obtain records. 2/15 IR consulted for LP. Check ammonia. Consult neuro. 2/16 Tele neuro consult this am-concerns for brain carcinoma. Recommend CT brain wo contrast 
2/20 Ongoing confusion/altered mental status that may make caring for patient at home challenging. 
2/21 More awake and conversive today.  has noted a big difference in disposition in the last couple days but still with concerns about caring for her given mentation and reports that her behavior can be \"demonic. \" Opioid induced constipation 
- Since KUB negative, Palliative care recommended lactulose TID x 3 doses and then Pericolace 2 tablets daily - BID. 2/14 on lactulose TID x 3 doses. Hypertension 2/16 cardiology consulted now on losartan, propanolol. Now also on amlodipine 5 mg daily. 2/20 Amlodipine increased to 10mg daily this AM.  
 
Tobacco abuse 2/17 smoker for 40+ years attempting cessation in November 2020 All questions answered to the best of our ability. 2/19/2021  I called and spoke with her  about his level of comfort with taking her home. His biggest concern is that she is very strong willed and he is worried that she will try to do too much. He is not opposed to rehab but would very much like to take her home. He is going to think things over and we will plan to revisit this tomorrow. PT recommends [place].  
 
2/20/2021  updated at bedside after rounds. Expresses concern about caring for Ms Sara Norris at home. Updated RN to allow  to visit tomorrow to be present for rounds. 2/21/2021 Discussed with  at bedside during rounds current clinical course. Explained that she would likely need XRT and twice weekly IT chemo for leptomeningeal disease with likely very little benefit and obvious effect on quality of life. Also discussed option of hospice.  would like to think this over but is more interested in a short trial of treatment. Dispo Pending. Was given sitter resources by CM several days ago in preparation for discharge as he is unsure of how he will be able to care for her given her confusion. Would like to think about hospice vs pursuing treatment. COREY Graham [facility] [id number] [place] [id number] [facility] Office : [phone] Fax : [phone] Attending Addendum: I have personally performed a face to face diagnostic evaluation on this patient. I have reviewed and agree with the care plan as documented above by Judy Mackenzie N.P.  My findings are as follows: Patient appears lethargic, heart rate regular without murmurs, abdomen is non-tender, bowel sounds are positive.  71 female now with hilar mass, pancreatic body, bones and liver lesions: Liver lesion biopsy results discussed with pathology, though not specific, results most consistent with breast primary.  Receptor status pending. Baseline confusion continues. S/p LP, CSF fluid w atypical cells. 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 Bone scan and spine MRI results noted. Breast TM high.  S/p IT MTX. Also on Dex 6 mg tid. Guarded prognosis, Detailed d/w  regarding options and goals of care including further cancer directed therapy vs a palliative approach. He would more time to think about options. If they decide to pursue more therapy, then will increase to IT MTX twice weekly, and also get rad-onc opinion.  
  
     
  
 
  
Dennison Goodpasture, MD 
[facility] [id number] [place] [address] Office : [phone] Fax : [phone]

## 2021-02-21 NOTE — PROGRESS NOTES
END OF SHIFT NOTE: 
 
 coming at [address] to talk with doctor. No c/o pain, V/D. zofran given 1x for nausea. VSS. No other needs voiced at this time. Intake/Output No intake/output data recorded. Voiding: YES Catheter: NO 
Drain:   
 
 
 
 
 
Stool:  0 occurrences. Stool Assessment Stool Color: Mclaughlin Mathew (02/19/21 2208) Stool Appearance: Loose (02/19/21 2208) Stool Amount: Medium (02/19/21 2208) Stool Source/Status: Rectum (02/19/21 2208) Emesis:  0 occurrences. VITAL SIGNS Patient Vitals for the past 12 hrs: 
 Temp Pulse Resp BP SpO2  
02/20/21 1545 97.9 °F (36.6 °C) 77 18 138/70 99 % 02/20/21 1115 97.5 °F (36.4 °C) 77 18 (!) 146/65 96 % 02/20/21 0838     94 % 02/20/21 0800 97.2 °F (36.2 °C) 87 18 (!) 151/82 96 % Pain Assessment Pain 1 Pain Scale 1: Numeric (0 - 10) (02/20/21 1525) Pain Intensity 1: 0 (02/20/21 1525) Patient Stated Pain Goal: 0 (02/19/21 1550) Pain Reassessment 1: Yes (02/18/21 1932) Pain Onset 1: ongoing (02/17/21 0825) Pain Location 1: Back; Abdomen (02/17/21 0825) Pain Orientation 1: Mid (02/17/21 0825) Pain Description 1: Constant (02/17/21 0825) Pain Intervention(s) 1: Medication (see MAR) (02/17/21 0825) Ambulating No 
 
Additional Information:  
 
Shift report given to oncoming nurse at the bedside. Bertin Novoa

## 2021-02-21 NOTE — PROGRESS NOTES
Problem: Falls - Risk of 
Goal: *Absence of Falls Description: Document Earma Pieter Fall Risk and appropriate interventions in the flowsheet. Outcome: Progressing Towards Goal 
Note: Fall Risk Interventions: 
Mobility Interventions: Bed/chair exit alarm Mentation Interventions: Bed/chair exit alarm, Door open when patient unattended, More frequent rounding, Reorient patient, Room close to nurse's station Medication Interventions: Bed/chair exit alarm, Evaluate medications/consider consulting pharmacy, Patient to call before getting OOB Elimination Interventions: Call light in reach, Patient to call for help with toileting needs, Bed/chair exit alarm History of Falls Interventions: Bed/chair exit alarm, Door open when patient unattended

## 2021-02-22 NOTE — PROGRESS NOTES
[facility] Hematology & Oncology Inpatient Hematology / Oncology Progress Note Admission Date: 2021 10:08 PM 
Reason for Admission/Hospital Course: FTT (failure to thrive) in adult [R62.7] Abdominal pain [R10.9] Confusion [R41.0] 24 Hour Events:  
IT MTX  Leptomeningeal disease Consult Hospice for home ROS: 
Constitutional: negative for fever, chills, weakness, malaise, fatigue. CV:  negative for chest pain, palpitations, edema. Respiratory: negative for dyspnea, cough, wheezing. GI:  negative for nausea, diarrhea. + for abdominal pain 10 point review of systems is otherwise negative with the exception of the elements mentioned above in the HPI. Allergies Allergen Reactions  Sulfa (Sulfonamide Antibiotics) Anaphylaxis  Buspar [Buspirone] Vertigo  Pcn [Penicillins] Hives  Wellbutrin [Bupropion Hcl] Other (comments) Constipation OBJECTIVE: 
Patient Vitals for the past 8 hrs: 
 BP Temp Pulse Resp SpO2  
21 1119 (!) 152/86 97.5 °F (36.4 °C) 78 18 98 % 21 0724 (!) 140/80 97.8 °F (36.6 °C) 84 18 91 % Temp (24hrs), Av.7 °F (36.5 °C), Min:97.5 °F (36.4 °C), Max:98.1 °F (36.7 °C) 
 
 0701 -  1900 In: -  
Out: 200 [Urine:200] Physical Exam: 
Constitutional: Well developed, well nourished female in no acute distress, sitting comfortably in the hospital bed. HEENT: Normocephalic and atraumatic. Oropharynx is clear, mucous membranes are moist.  Pupils are equal, round, and reactive to light. Extraocular muscles are intact. Sclerae anicteric. Lymph node Deferred Skin Warm and dry. No bruising and no rash noted. No erythema. No pallor. Respiratory Lungs are clear to auscultation bilaterally without wheezes, rales or rhonchi, normal air exchange without accessory muscle use. CVS Normal rate, regular rhythm and normal S1 and S2. No murmurs, gallops, or rubs. Abdomen Soft, tender and nondistended, normoactive bowel sounds. No palpable mass. No hepatosplenomegaly. Neuro Pleasantly confused. Oriented to self. MSK Normal range of motion in general.  No edema and no tenderness. Psych Pleasantly confused Labs: 
   
Recent Labs  
  02/22/21 
0510 02/21/21 
0459 02/20/21 
2705 WBC 7.5 9.3 9.0  
RBC 4.48 4.12 4.31  
HGB 11.6* 10.8* 11.1*  
HCT 34.4* 31.9* 33.1*  
MCV 76.8* 77.4* 76.8*  
MCH 25.9* 26.2 25.8* MCHC 33.7 33.9 33.5  
RDW 14.6 14.8* 14.8*  
* 493* 474* GRANS 89* 85* 85* LYMPH 10* 13 11* MONOS 1* 1* 3*  
EOS 0* 0* 0*  
BASOS 0 0 0 IG 1 0 0  
DF AUTOMATED AUTOMATED AUTOMATED ANEU 6.6 7.9 7.7 ABL 0.8 1.3 1.0 ABM 0.1 0.1 0.3 CITLALY 0.0 0.0 0.0 ABB 0.0 0.0 0.0 AIG 0.1 0.0 0.0 Recent Labs  
  02/22/21 
0510 02/21/21 
0459 02/20/21 
2616 * 132* 134* K 4.3 4.0 4.0  
CL 97* 101 102 CO2 22 22 22 AGAP 9 9 10 * 107* 119* BUN 14 18 15 CREA 0.46* 0.49* 0.42* GFRAA >60 >60 >60 GFRNA >60 >60 >60  
CA 9.3 9.2 9.4 * 363* 354* TP 6.8 6.4 6.4 ALB 2.7* 2.4* 2.4*  
GLOB 4.1* 4.0* 4.0* AGRAT 0.7* 0.6* 0.6* MG 2.0 2.0 2.0 Imaging: 
 
Medications: 
Current Facility-Administered Medications Medication Dose Route Frequency  dexAMETHasone (DECADRON) tablet 6 mg  6 mg Oral TID  LORazepam (ATIVAN) tablet 1 mg  1 mg Oral Q6H PRN  
 amLODIPine (NORVASC) tablet 10 mg  10 mg Oral DAILY  nystatin (MYCOSTATIN) 100,000 unit/mL oral suspension 500,000 Units  500,000 Units Oral QID  magnesium oxide (MAG-OX) tablet 400 mg  400 mg Oral TID  senna-docusate (PERICOLACE) 8.6-50 mg per tablet 2 Tab  2 Tab Oral BID  losartan (COZAAR) tablet 100 mg  100 mg Oral DAILY  naloxone (NARCAN) injection 0.4 mg  0.4 mg IntraVENous PRN  
 alum-mag hydroxide-simeth (MYLANTA) oral suspension 30 mL  30 mL Oral Q4H PRN  potassium chloride (K-DUR, KLOR-CON) SR tablet 20 mEq  20 mEq Oral BID  
  oxyCODONE IR (ROXICODONE) tablet 5 mg  5 mg Oral Q6H PRN  
 ondansetron (ZOFRAN) injection 8 mg  8 mg IntraVENous Q8H PRN  
 hydrALAZINE (APRESOLINE) 20 mg/mL injection 10 mg  10 mg IntraVENous Q6H PRN  
 famotidine (PEPCID) tablet 40 mg  40 mg Oral DAILY  FLUoxetine (PROzac) capsule 60 mg  60 mg Oral QHS  fluticasone propionate (FLONASE) 50 mcg/actuation nasal spray 2 Spray  2 Spray Both Nostrils DAILY  budesonide-formoteroL (SYMBICORT) 160-4.5 mcg/actuation HFA inhaler 2 Puff  2 Puff Inhalation BID RT  
 levothyroxine (SYNTHROID) tablet 88 mcg  88 mcg Oral ACB  pravastatin (PRAVACHOL) tablet 40 mg  40 mg Oral QHS  traMADoL (ULTRAM) tablet 50 mg  50 mg Oral Q6H PRN  propranolol LA (INDERAL LA) capsule 120 mg  120 mg Oral DAILY  0.9% sodium chloride infusion  75 mL/hr IntraVENous CONTINUOUS  
 enoxaparin (LOVENOX) injection 40 mg  40 mg SubCUTAneous Q24H  
 
 
 
ASSESSMENT: 
 
Problem List  Date Reviewed: 2/12/2021 Codes Class Noted Severe protein-calorie malnutrition ([facility] ICD-10-CM: B07 ICD-9-CM: 083  2/19/2021 Hypertension ICD-10-CM: I10 
ICD-9-CM: 401.9  2/16/2021 Dehydration ICD-10-CM: E86.0 ICD-9-CM: 276.51  2/12/2021 Confusion ICD-10-CM: R41.0 ICD-9-CM: 298.9  2/12/2021 Failure to thrive in adult ICD-10-CM: R62.7 ICD-9-CM: 783.7  2/12/2021 Abdominal pain ICD-10-CM: R10.9 ICD-9-CM: 789.00  2/12/2021 FTT (failure to thrive) in adult ICD-10-CM: R62.7 ICD-9-CM: 783.7  2/12/2021 Generalized abdominal pain ICD-10-CM: R10.84 ICD-9-CM: 789.07  1/31/2021 Hepatic lesion ICD-10-CM: K76.9 ICD-9-CM: 573.8  1/30/2021 Leukocytosis ICD-10-CM: F25.234 ICD-9-CM: 288.60  1/30/2021 UTI (urinary tract infection) ICD-10-CM: N39.0 ICD-9-CM: 599.0  1/30/2021 Pancreatic lesion ICD-10-CM: K86.9 ICD-9-CM: 577.9  1/30/2021 Elevated LFTs ICD-10-CM: R79.89 ICD-9-CM: 790.6  1/30/2021 Lung nodule ICD-10-CM: R91.1 ICD-9-CM: 793.11  9/23/2020 Dyspepsia ICD-10-CM: R10.13 ICD-9-CM: 536.8  2/6/2019 Cervicalgia ICD-10-CM: M54.2 ICD-9-CM: 723.1  2/6/2019 Seborrheic keratoses, inflamed ICD-10-CM: L82.0 ICD-9-CM: 702.11  8/14/2018 History of cigarette smoking ICD-10-CM: Z87.891 ICD-9-CM: V15.82  7/10/2018 Gastroesophageal reflux disease without esophagitis ICD-10-CM: K21.9 ICD-9-CM: 530.81  5/17/2018 Palpitations ICD-10-CM: R00.2 ICD-9-CM: 785.1  7/25/2017 Microscopic hematuria ICD-10-CM: R31.29 ICD-9-CM: 599.72  6/27/2017 Memory loss ICD-10-CM: R41.3 ICD-9-CM: 780.93  10/5/2016 Breast mass in female ICD-10-CM: N63.0 ICD-9-CM: 611.72  10/27/2015 Fatigue due to depression ICD-10-CM: F32.9, R53.83 ICD-9-CM: 801, 780.79  10/23/2015 Acquired hypothyroidism ICD-10-CM: E03.9 ICD-9-CM: 244.9  10/23/2015 Essential hypertension ICD-10-CM: I10 
ICD-9-CM: 401.9  Unknown Depression, unipolar ([address] ICD-10-CM: F33.9 ICD-9-CM: 296.20  Unknown Pure hypercholesterolemia ICD-10-CM: E78.00 ICD-9-CM: 272.0  Unknown PLAN: 
Pancreatic body and liver lesion/leptomenigial disease/bone lesions - path from biopsy on 2/2 pending 2/17 check breast tumor markers. patho sending out Caris, ER/TN/HER2. CSF + atypical cells-IR consulted for IT MTX. MRI spine ordered. Then will need NM bone survey. 2/18 BMbx moderately differentiating adenocarcinoma. IT MTX today. Breast tumor markers elevated. Cervical spine multiple osseous lesions suspicious for metastases. T and L spine pending. Bone scan pending. 2/19 L spine +extensive mets, T spine diffuse osseous mets. Liver biopsy +moderately differentiating adenocarcinoma-other studies pending 2/20 Caris sent per navigator. Bone scan shows bilateral femora and right calvarium and questionable right ischial tuberosity metastatic disease. 2/21 Started on 4mg Dex BID 2/19 and increased to 6mg Dex BID yesterday. PMH palpable lump right breast 3/2020 
-second look mammogram  demonstrated no definite focal abnormality, normal-appearing breast tissue in the right breast 6-7 o'clock positions. Abdominal pain  
- KUB negative, but did note? Foreign body in the left upper abdomen, but unsure if intraabdominal or exterior. Dr. Herndon Crew personally reviewed images. Reviewed palliative care recommendations for pain management. They stated could trial oxycodone 5 mg vs dilaudid 2 mg. Will try oxycodone 5 mg PRN. Will hold tramadol and Morphine in light of possible AMS. 2/14 some abdominal pain this AM, but she wishes to hold off on pain medication at this time. 2/16 pain resolved, ongoing discomfort 2/21 No complaints, not having to utilize pain meds. Hallucinations/Confusion 
-Brain MRI pending 
-Hold Morphine and Tramadol. Try Oxycodone. 2/14 MRI of the brain with some pachymeningeal thickening and enhancement of the right convexity. Infection like meningitis is possible and intracranial hypotension is less likely. 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 Will order LP for tomorrow with cytology. Of note, patient notes that she has had a LP in the past with Dr. Viji Florian, will see if we can obtain records. 2/15 IR consulted for LP. Check ammonia. Consult neuro. 2/16 Tele neuro consult this am-concerns for brain carcinoma. Recommend CT brain wo contrast 
2/20 Ongoing confusion/altered mental status that may make caring for patient at home challenging. 
2/21 More awake and conversive today.  has noted a big difference in disposition in the last couple days but still with concerns about caring for her given mentation and reports that her behavior can be \"demonic. \" Opioid induced constipation 
- Since KUB negative, Palliative care recommended lactulose TID x 3 doses and then Pericolace 2 tablets daily - BID. 2/14 on lactulose TID x 3 doses. Hypertension 2/16 cardiology consulted now on losartan, propanolol. Now also on amlodipine 5 mg daily. 2/20 Amlodipine increased to 10mg daily this AM.  
 
Tobacco abuse 2/17 smoker for 40+ years attempting cessation in November 2020 All questions answered to the best of our ability. 2/19/2021  I called and spoke with her  about his level of comfort with taking her home. His biggest concern is that she is very strong willed and he is worried that she will try to do too much. He is not opposed to rehab but would very much like to take her home. He is going to think things over and we will plan to revisit this tomorrow. PT recommends [facility].  
 
2/20/2021  updated at bedside after rounds. Expresses concern about caring for Ms Angelica Guerra at home. Updated RN to allow  to visit tomorrow to be present for rounds. 2/21/2021 Discussed with  at bedside during rounds current clinical course. Explained that she would likely need XRT and twice weekly IT chemo for leptomeningeal disease with likely very little benefit and obvious effect on quality of life. Also discussed option of hospice.  would like to think this over but is more interested in a short trial of treatment. 2/22/2021 Liver bx +adenocarcinoma. 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 Right hilar mass/adenopathy and subcarinal lymph nodes. Ongoing AMS. Patient's spouse has made decision for comfort measures. Hospice consulted for home. Robin Shultz, NICOLAS [facility] Hematology & Oncology [id number] [address] Office : [phone] Fax : [phone] Attending Addendum: I have personally performed a face to face diagnostic evaluation on this patient. I have reviewed and agree with the care plan as documented by Ezekiel Warner N.P. My findings are as follows: She has metastatic adenocarcinoma, appears weak, heart rate regular without murmurs, abdomen is non-tender, bowel sounds are positive, we will arrange for her to go to hospice. Abbi Patel MD 
 
 
[address] [place] [address] Hematology/Oncology [address] [place] [address] Office : [phone] Fax : [phone]

## 2021-02-22 NOTE — PROGRESS NOTES
2/23/21-10am- Just spoke to Hospice liaison , Eulogio Landa, she is calling pt's spouse now. Anticipating dc home today. Faviola to call SW back to confirm plans. DL Gomez 
 
2/22/21-NP spoke with SW and states has decided to go home with Hospice rather than [facility]. Hospice referral entered. SW following. DL Gomez

## 2021-02-23 NOTE — HOSPICE
[facility] Patient is approved for home hospice care and ready for discharge.  agrees with hospice philosophy of care. [address] RN will see patient in her home this afternoon for admission visit. YOVANNY Schafer Serve aware of above plan. Thank you for this referral. 
 
Elroy Olsen RN BSN 
[address] liaison [phone]

## 2021-02-23 NOTE — PROGRESS NOTES
Care Management Interventions PCP Verified by CM: Yes Mode of Transport at Discharge: Self Transition of Care Consult (CM Consult): Home Hospice [facility] - INPATIENT: Yes [address]: Yes Physical Therapy Consult: Yes Current Support Network: Lives with Spouse Confirm Follow Up Transport: Family The Patient and/or Patient Representative was Provided with a Choice of Provider and Agrees with the Discharge Plan?: Yes Freedom of Choice List was Provided with Basic Dialogue that Supports the Patient's Individualized Plan of Care/Goals, Treatment Preferences and Shares the Quality Data Associated with the Providers?: Yes Discharge Location Discharge Placement: Home with hospice SW made [facility] referral yesterday, spoke with Amos Alonso the [facility] liaison who spoke with  and plans are for dc today. DME being delivered today and Hospice RN will be out today. Spouse transporting pt home by car. DNR sent home with spouse at the request of Tex Carrera gave the copy to spouse. No additional needs or questions identified at the time of this assessment. Shelby Loza, BSW

## 2021-02-23 NOTE — DISCHARGE INSTRUCTIONS
Patient Education        Hospice: Care Instructions  Your Care Instructions  Hospice care provides medical treatment to relieve symptoms at the end of life. The goal is to keep you comfortable, not to try to cure you. Hospice care does not speed up or lengthen dying. It focuses on easing pain and other symptoms. Hospice caregivers want to enhance your quality of life. Hospice care also offers emotional help and spiritual support when you are dying. It also helps family members care for a loved one who is dying. Hospice care can help you review your life, say important things to family and friends, and explore spiritual issues. Hospice also helps your family and friends deal with their grief after you die. You can use hospice care if your illness cannot be cured and doctors believe you have no more than 6 months to live. You do not need to be confined to a bed or in a hospital to benefit from this type of care. The hospice team includes nurses, counselors, therapists, social workers, pastors, home health aides, and trained volunteers. You can get care in your own home or in a hospice center. Some hospice workers also go to nursing homes or hospitals. How can you care for yourself at home? · Prepare a list of advance directives. These are instructions to your doctor and family members about what kind of care you want if you become unable to speak or express yourself. · Find out if your health insurance covers hospice care. · Find hospice programs in your area. People who can help include your doctor, your state health department, and your insurance company. · Decide what kinds of hospice services you want. It helps to know what you want before you enter a hospice program.  · Think about some questions when preparing for hospice care. ? Who do you want to make decisions about your medical care if you are not able to? Many people choose their spouse, child, or doctor. ?  What are you most afraid of that might happen? You might be afraid of having pain or losing your independence. Let your hospice team know your fears. The team can help you deal with them. ? Where would you prefer to die? Choices include your home, a hospital, or a nursing home. ? Do you want to donate organs when you die? Make sure that your family clearly understands this. ? Do you want any Anabaptism rites or practices to be done before you die? Let your hospice team know what you want. Where can you learn more? Go to http://www.gray.com/  Enter T191 in the search box to learn more about \"Hospice: Care Instructions. \"  Current as of: December 9, 2019               Content Version: 12.6  © 5871-1771 [employer]. Care instructions adapted under license by [employer] (which disclaims liability or warranty for this information). If you have questions about a medical condition or this instruction, always ask your healthcare professional. [address] any warranty or liability for your use of this information.

## 2021-02-23 NOTE — PROGRESS NOTES
Physical therapy - spoke with patient who reports she was going home, wanted to rest.  She felt ok about getting into house with . She had no questions. SW working on discharge.

## 2024-05-14 NOTE — PROGRESS NOTES
SW met with patient who confirmed demographic information, states that she lives with her  Mahnaz Hensley and has no other local family. Patient does report that the couple have many children that they have in essence adopted and close friends who are like family. Patient is seen by Dr. Madison Kenny and is current, but is looking for a new provider. Referral to [facility]. Patient uses a walker to ambulate, only fell once in the last twelve months which was while riding a bike, and states that since becoming sick she's required ADL assistance from her . Anticipate home with family at discharge. [facility] referral sent for PCP. Patient requested information for hospital [facility]. [facility] info provided. RIVER Chaidez  [address] [place] [address] * [email or web address]  
 
 Wear the short cam walking boot for the next 10 days.